# Patient Record
Sex: FEMALE | Race: BLACK OR AFRICAN AMERICAN | Employment: OTHER | URBAN - METROPOLITAN AREA
[De-identification: names, ages, dates, MRNs, and addresses within clinical notes are randomized per-mention and may not be internally consistent; named-entity substitution may affect disease eponyms.]

---

## 2018-09-16 ENCOUNTER — HOSPITAL ENCOUNTER (EMERGENCY)
Age: 68
Discharge: HOME | End: 2018-09-17
Attending: EMERGENCY MEDICINE | Admitting: EMERGENCY MEDICINE
Payer: MEDICARE
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
## 2018-09-16 DIAGNOSIS — S80.211A KNEE ABRASION, RIGHT, INITIAL ENCOUNTER: ICD-10-CM

## 2018-09-16 PROCEDURE — 99283 EMERGENCY DEPT VISIT LOW MDM: CPT

## 2018-09-17 ENCOUNTER — APPOINTMENT (OUTPATIENT)
Dept: GENERAL RADIOLOGY | Age: 68
End: 2018-09-17
Attending: EMERGENCY MEDICINE
Payer: MEDICARE

## 2018-09-17 VITALS
WEIGHT: 186 LBS | BODY MASS INDEX: 35.12 KG/M2 | SYSTOLIC BLOOD PRESSURE: 134 MMHG | DIASTOLIC BLOOD PRESSURE: 44 MMHG | RESPIRATION RATE: 15 BRPM | TEMPERATURE: 100.5 F | OXYGEN SATURATION: 97 % | HEART RATE: 72 BPM | HEIGHT: 61 IN

## 2018-09-17 PROCEDURE — 73562 X-RAY EXAM OF KNEE 3: CPT

## 2018-09-17 NOTE — DISCHARGE INSTRUCTIONS

## 2018-09-17 NOTE — ED NOTES
Pt awaken for d/c instructions to be reviewed. Have reviewed d/c instructions with pt/understanding acknowledged by pt. Pt being d/c in the custody of [employer]. Officer calling for back up- then will d/c pt from the room accordingly. NAD observed upon d/c with the [employer].

## 2018-09-17 NOTE — ED NOTES
Pt arrives to ED via EMS in custody of [employer]. Pt presents to be A/O x 4, answering questions appropriately. \"I haven't been drinking,\"  Pt has an abrasion of her RT knee area- small amt of dry blood observed. Skit her knee on the pavement while walking to the cruiser with the [employer]. Pt denying any other injury. NAD observed. [employer] standing outside pt's room.

## 2018-09-17 NOTE — ED NOTES
2nd officer at bedside. Pt being escorted by the [employer] as d/c in the [employer] custody. Pt wheeled from the ED via [facility] by this RN accompanied by the [employer]. Pt's  Paperwork/and cell phone being carried by the [employer] officer.

## 2018-09-17 NOTE — ED PROVIDER NOTES
EMERGENCY DEPARTMENT HISTORY AND PHYSICAL EXAM 
 
Date: 9/16/2018 Patient Name: Bailee Bass History of Presenting Illness Chief Complaint Patient presents with  Fall History Provided By: Patient Chief Complaint: Darius Renetta Duration: PTA Timing:  Acute Severity: 7 out of 10 Modifying Factors: No modifying factors Associated Symptoms: right knee pain and abrasion Additional History (Context):  
.11:35 PM 
Bailee Bass is a 80 y.o. female with PMHx of DM, cirrhosis, hepatitis C, sciatica, heroin abuse, chronic back pain, drug abuse presents to the emergency department via EMS following a fall onset 7/10. Associated sxs include right knee pain and abrasion. Pt was under arrest when she fell and landed on her knee, sustaining no head injury. Pt could not ambulate after the fall and is refusing a Tetanus shot. Pt denies drinking tonight and is not currently on blood thinners. Pt denies HA, and any other sxs or complaints. PCP: Gilberto Reynaga MD 
 
Current Outpatient Prescriptions Medication Sig Dispense Refill  traMADol (ULTRAM) 50 mg tablet Take 1 Tab by mouth every six (6) hours as needed for Pain. Max Daily Amount: 200 mg. 12 Tab 0  
 albuterol (PROVENTIL HFA, VENTOLIN HFA, PROAIR HFA) 90 mcg/actuation inhaler Take  by inhalation.  benzonatate (TESSALON) 100 mg capsule Take 100 mg by mouth three (3) times daily as needed for Cough.  carboxymethylcellulose sodium 1 % dlgl Apply  to eye.  cycloSPORINE (RESTASIS) 0.05 % ophthalmic emulsion Administer 1 Drop to both eyes two (2) times a day.  guaiFENesin (ROBITUSSIN) 100 mg/5 mL liquid Take 200 mg by mouth three (3) times daily as needed for Cough.  hydrOXYzine (VISTARIL) 25 mg capsule Take 25 mg by mouth three (3) times daily as needed for Itching.  levothyroxine (SYNTHROID) 50 mcg tablet Take 50 mcg by mouth Daily (before breakfast).  methylPREDNISolone (MEDROL) 4 mg tablet Take 4 mg by mouth daily (with breakfast).  metroNIDAZOLE (FLAGYL) 250 mg tablet Take  by mouth three (3) times daily.  nicotine (NICODERM CQ) 21 mg/24 hr 1 Patch by TransDERmal route every twenty-four (24) hours.  omeprazole (PRILOSEC) 20 mg capsule Take 20 mg by mouth daily.  rOPINIRole (REQUIP) 0.25 mg tablet Take 0.25 mg by mouth three (3) times daily.  spironolactone (ALDACTONE) 25 mg tablet Take  by mouth daily.  traZODone (DESYREL) 100 mg tablet Take 100 mg by mouth nightly.  TOPIRAMATE (TOPAMAX PO) Take  by mouth. Past History Past Medical History: 
Past Medical History:  
Diagnosis Date  Chronic back pain  Diabetes ([address]  Drug abuse  Hepatic cirrhosis (HCC)  Hepatitis C   
 Heroin abuse  Pain management  Restless leg syndrome  Sciatica  Spleen enlarged  Thyroid disease Past Surgical History: 
Past Surgical History:  
Procedure Laterality Date  HX CHOLECYSTECTOMY  HX GYN    
 hysterectomy  HX HEENT    
 T&A  HX ORTHOPAEDIC Bunion, left hand and right arm abscess removal  
 
 
Family History: 
History reviewed. No pertinent family history. Social History: 
Social History Substance Use Topics  Smoking status: Current Every Day Smoker  Smokeless tobacco: Never Used  Alcohol use No  
 
 
Allergies: Allergies Allergen Reactions  Erythromycin Hives  Penicillins Hives  Tetracycline Hives Review of Systems Review of Systems Musculoskeletal: Positive for arthralgias. Negative for joint swelling. Skin: Positive for wound. Neurological: Negative for headaches. All other systems reviewed and are negative. Physical Exam  
 
Vitals:  
 09/16/18 2333 BP: 143/76 Pulse: 78 Resp: 18 Temp: 98.8 °F (37.1 °C) SpO2: 98% Weight: 84.4 kg (186 lb) Height: 5' 1\" (1.549 m) Physical Exam  
 Nursing note and vitals reviewed. Constitutional: Well appearing, no acute distress, Disheveled Head: Normocephalic, Atraumatic Eyes: Pupils are equal, round, and reactive to light, EOMI Neck: Supple, non-tender Cardiovascular: Regular rate and rhythm, no murmurs, rubs, or gallops Chest: Normal work of breathing and chest excursion bilaterally Lungs: Clear to ausculation bilaterally Abdomen: Soft, non tender, non distended, normoactive bowel sounds Back: No evidence of trauma or deformity Extremities: no LE edema Abrasion to her right knee and tenderness to her anteriosuperior right tibia Skin: Warm and dry, normal cap refill. Bruising over her breasts from a recent biopsy Neuro: Alert and appropriate, CN intact, normal speech, strength and sensation full and symmetric bilaterally, normal gait, normal coordination Psychiatric: Normal mood and affect Diagnostic Study Results Labs - No results found for this or any previous visit (from the past 12 hour(s)). Radiologic Studies -  
XR KNEE RT 3 V Final Result Impression:   
  Impression: 
------------- Mild medial knee degenerative change. No fracture or joint effusion.  
  Narrative:  
  --------------------------------------------------------------------------- 
<<<<<<<<<           [facility]           >>>>>>>>>  
--------------------------------------------------------------------------- CLINICAL HISTORY: Injury. Pain. COMPARISON EXAMINATIONS: None. --- Right knee, 3 views --- No fracture or suspicious bone lesion.  There is mild medial knee degenerative 
change with mild loss of joint space and osteophyte formation. The soft tissues are unremarkable.  No knee effusion. 
 
 
-------------  
 
  
 
CT Results  (Last 48 hours) None CXR Results  (Last 48 hours) None Medical Decision Making I am the first provider for this patient. I reviewed the vital signs, available nursing notes, past medical history, past surgical history, family history and social history. Vital Signs-Reviewed the patient's vital signs. Pulse Oximetry Analysis - 98% on RA Cardiac Monitor: 
Rate: 78 bpm 
Rhythm: NSR Records Reviewed: Nursing Notes and Old Medical Records Provider Notes (Medical Decision Making):  
 
Procedures: 
Procedures ED Course:  
11:35 PM Initial assessment performed. The patients presenting problems have been discussed, and they are in agreement with the care plan formulated and outlined with them. I have encouraged them to ask questions as they arise throughout their visit. Diagnosis and Disposition DISCHARGE NOTE: 
1:04 AM 
Herminia AgostoDereckShukri's  results have been reviewed with her. She has been counseled regarding her diagnosis, treatment, and plan. She verbally conveys understanding and agreement of the signs, symptoms, diagnosis, treatment and prognosis and additionally agrees to follow up as discussed. She also agrees with the care-plan and conveys that all of her questions have been answered. I have also provided discharge instructions for her that include: educational information regarding their diagnosis and treatment, and list of reasons why they would want to return to the ED prior to their follow-up appointment, should her condition change. She has been provided with education for proper emergency department utilization. CLINICAL IMPRESSION: 
 
1. Fall, initial encounter 2. Knee abrasion, right, initial encounter Discussion: 
80 y.o. female status post mechanical fall in police custody resulting in a right knee abrasion. No acute  fx on imaging. Will discharge with PCP follow up and return precautions. Refuses Tetanus shot. PLAN: 
1. D/C Home 2. Current Discharge Medication List  
  
 
3. Follow-up Information Follow up With Details Comments Contact Info Armani Dsouza DO  For primary care follow up [address] [place] [address] [address] [place] [address] 
[phone] [facility]  As needed, if symptoms worsen [address] [place] [address] 
[phone]  
  
 
_______________________________ Attestations: This note is prepared by Sofia Camp, acting as Scribe for Clifford Bailon MD. Clifford Bailon MD:  The scribe's documentation has been prepared under my direction and personally reviewed by me in its entirety. I confirm that the note above accurately reflects all work, treatment, procedures, and medical decision making performed by me. 
_______________________________

## 2018-09-17 NOTE — ED TRIAGE NOTES
Pt is currently under arrest and was being taken to the patrol car when she fell and hurt her right knee. Abrasion noted to the right knee.

## 2018-09-17 NOTE — ED NOTES
Cleaned abrasion of RT knee with wound cleanser. Band aide applied. Pt tolerated well. [employer] at bedside.

## 2018-11-03 ENCOUNTER — APPOINTMENT (OUTPATIENT)
Dept: GENERAL RADIOLOGY | Age: 68
End: 2018-11-03
Attending: EMERGENCY MEDICINE
Payer: MEDICARE

## 2018-11-03 ENCOUNTER — HOSPITAL ENCOUNTER (EMERGENCY)
Age: 68
Discharge: HOME | End: 2018-11-03
Attending: EMERGENCY MEDICINE
Payer: MEDICARE

## 2018-11-03 VITALS
OXYGEN SATURATION: 98 % | TEMPERATURE: 97.9 F | HEART RATE: 66 BPM | BODY MASS INDEX: 34.17 KG/M2 | RESPIRATION RATE: 20 BRPM | DIASTOLIC BLOOD PRESSURE: 95 MMHG | SYSTOLIC BLOOD PRESSURE: 161 MMHG | HEIGHT: 61 IN | WEIGHT: 181 LBS

## 2018-11-03 DIAGNOSIS — S50.11XA CONTUSION OF RIGHT FOREARM, INITIAL ENCOUNTER: Primary | ICD-10-CM

## 2018-11-03 PROCEDURE — 99282 EMERGENCY DEPT VISIT SF MDM: CPT

## 2018-11-03 PROCEDURE — 73090 X-RAY EXAM OF FOREARM: CPT

## 2018-11-03 NOTE — DISCHARGE INSTRUCTIONS
Contusion: Care Instructions  Your Care Instructions    Contusion is the medical term for a bruise. It is the result of a direct blow or an impact, such as a fall. Contusions are common sports injuries. Most people think of a bruise as a black-and-blue spot. This happens when small blood vessels get torn and leak blood under the skin. But bones, muscles, and organs can also get bruised. This may damage deep tissues but not cause a bruise you can see. The doctor will do a physical exam to find the location of your contusion. You may also have tests to make sure you do not have a more serious injury, such as a broken bone or nerve damage. These may include X-rays or other imaging tests like a CT scan or MRI. Deep-tissue contusions may cause pain and swelling. But if there is no serious damage, they will often get better in a few weeks with home treatment. The doctor has checked you carefully, but problems can develop later. If you notice any problems or new symptoms, get medical treatment right away. Follow-up care is a key part of your treatment and safety. Be sure to make and go to all appointments, and call your doctor if you are having problems. It's also a good idea to know your test results and keep a list of the medicines you take. How can you care for yourself at home? · Put ice or a cold pack on the sore area for 10 to 20 minutes at a time to stop swelling. Put a thin cloth between the ice pack and your skin. · Be safe with medicines. Read and follow all instructions on the label. ? If the doctor gave you a prescription medicine for pain, take it as prescribed. ? If you are not taking a prescription pain medicine, ask your doctor if you can take an over-the-counter medicine. · If you can, prop up the sore area on pillows as much as possible for the next few days. Try to keep the sore area above the level of your heart. When should you call for help?   Call your doctor now or seek immediate medical care if:    · Your pain gets worse.     · You have new or worse swelling.     · You have tingling, weakness, or numbness in the area near the contusion.     · The area near the contusion is cold or pale.    Watch closely for changes in your health, and be sure to contact your doctor if:    · You do not get better as expected. Where can you learn more? Go to http://vic-angle.info/. Enter U641 in the search box to learn more about \"Contusion: Care Instructions. \"  Current as of: November 20, 2017  Content Version: 11.8  © 1075-2488 [employer]. Care instructions adapted under license by [employer] (which disclaims liability or warranty for this information). If you have questions about a medical condition or this instruction, always ask your healthcare professional. [address] any warranty or liability for your use of this information.

## 2018-11-03 NOTE — ED TRIAGE NOTES
C/o right arm pain for one week. States she slept on her arm \"wrong\" for many hours, seen at the [facility] ED yesterday for the same. States they couldn't rule out a fracture, given ibuprofen and a sling. States the [place] is supposed to be getting pt in to see ortho.  States she is here for pain medication and to see if her arm is broken and if she needs a cast.

## 2018-11-03 NOTE — ED PROVIDER NOTES
EMERGENCY DEPARTMENT HISTORY AND PHYSICAL EXAM 
 
Date: 11/3/2018 Patient Name: Susana Brown History of Presenting Illness Chief Complaint Patient presents with  Arm Pain History Provided By: Patient Chief Complaint: Arm Pain Duration: 1 Weeks Timing:  Constant Location: Right elbow to forearm Severity: 10 out of 10 Modifying Factors: Worse with movement Associated Symptoms: denies any other associated signs or symptoms Additional History (Context):  
1:24 PM 
Susana Brown is a 80 y.o. female with PMHX DM, HTN, hypothyroidism presents to the emergency department C/O constant right arm pain extending from the elbow to the forearm, onset 1 week ago s/p falling asleep with her head propped up on her right arms. Worse with movement. Currently taking Ibuprofen and Gabapentin for pain. No other associated sxs. Pt was seen at the [facility] yesterday, XR showed no acute fractures, and pt is waiting to follow up with orthopedics. Endorses tobacco use. Pt denies any other sxs or complaints. PCP: Blu, MD Boo 
 
Current Outpatient Medications Medication Sig Dispense Refill  traMADol (ULTRAM) 50 mg tablet Take 1 Tab by mouth every six (6) hours as needed for Pain. Max Daily Amount: 200 mg. 12 Tab 0  
 albuterol (PROVENTIL HFA, VENTOLIN HFA, PROAIR HFA) 90 mcg/actuation inhaler Take  by inhalation.  benzonatate (TESSALON) 100 mg capsule Take 100 mg by mouth three (3) times daily as needed for Cough.  carboxymethylcellulose sodium 1 % dlgl Apply  to eye.  cycloSPORINE (RESTASIS) 0.05 % ophthalmic emulsion Administer 1 Drop to both eyes two (2) times a day.  guaiFENesin (ROBITUSSIN) 100 mg/5 mL liquid Take 200 mg by mouth three (3) times daily as needed for Cough.  hydrOXYzine (VISTARIL) 25 mg capsule Take 25 mg by mouth three (3) times daily as needed for Itching.     
 levothyroxine (SYNTHROID) 50 mcg tablet Take 50 mcg by mouth Daily (before breakfast).  methylPREDNISolone (MEDROL) 4 mg tablet Take 4 mg by mouth daily (with breakfast).  metroNIDAZOLE (FLAGYL) 250 mg tablet Take  by mouth three (3) times daily.  nicotine (NICODERM CQ) 21 mg/24 hr 1 Patch by TransDERmal route every twenty-four (24) hours.  omeprazole (PRILOSEC) 20 mg capsule Take 20 mg by mouth daily.  rOPINIRole (REQUIP) 0.25 mg tablet Take 0.25 mg by mouth three (3) times daily.  spironolactone (ALDACTONE) 25 mg tablet Take  by mouth daily.  traZODone (DESYREL) 100 mg tablet Take 100 mg by mouth nightly.  TOPIRAMATE (TOPAMAX PO) Take  by mouth. Past History Past Medical History: 
Past Medical History:  
Diagnosis Date  Chronic back pain  Diabetes ([address]  Drug abuse  Hepatic cirrhosis (HCC)  Hepatitis C   
 Heroin abuse  Pain management  Restless leg syndrome  Sciatica  Spleen enlarged  Thyroid disease Past Surgical History: 
Past Surgical History:  
Procedure Laterality Date  HX CHOLECYSTECTOMY  HX GYN    
 hysterectomy  HX HEENT    
 T&A  HX ORTHOPAEDIC Bunion, left hand and right arm abscess removal  
 
 
Family History: No family history on file. Social History: 
Social History Tobacco Use  Smoking status: Current Every Day Smoker  Smokeless tobacco: Never Used Substance Use Topics  Alcohol use: No  
 Drug use: No  
 
 
Allergies: Allergies Allergen Reactions  Erythromycin Hives  Penicillins Hives  Tetracycline Hives Review of Systems Review of Systems Constitutional: Negative for chills and fever. Musculoskeletal: Positive for myalgias (right arm). All other systems reviewed and are negative. Physical Exam  
 
Vitals:  
 11/03/18 1250 BP: 153/81 Pulse: 68 Resp: 18 Temp: 97.9 °F (36.6 °C) SpO2: 98% Weight: 82.1 kg (181 lb) Height: 5' 1\" (1.549 m) Physical Exam  
 Nursing note and vitals reviewed. Constitutional: Well appearing, no acute distress Head: Normocephalic, Atraumatic Eyes: Pupils are equal, round, and reactive to light, EOMI Neck: Supple, non-tender Chest: Normal work of breathing and chest excursion bilaterally Back: No evidence of trauma or deformity Extremities: No evidence of trauma or deformity, no LE edema. No obvious deformity or trauma to the right forearm, however, TTP to the proximal forearm. DNV. Skin: Warm and dry, normal cap refill Neuro: Alert and appropriate, CN intact, normal speech, strength and sensation full and symmetric bilaterally, normal gait, normal coordination Psychiatric: Normal mood and affect Diagnostic Study Results Labs - No results found for this or any previous visit (from the past 12 hour(s)). Radiologic Studies -  
XR FOREARM RT AP/LAT Final Result IMPRESSION: 
 
No significant abnormality. As read by the radiologist.   
 
CT Results  (Last 48 hours) None CXR Results  (Last 48 hours) None Medical Decision Making I am the first provider for this patient. I reviewed the vital signs, available nursing notes, past medical history, past surgical history, family history and social history. Vital Signs-Reviewed the patient's vital signs. Pulse Oximetry Analysis - 98% on RA Records Reviewed: Nursing Notes Provider Notes:  
80 y.o. female presenting with right forearm pain x1 week. Seen at [place] ED yesterday and was told she had no fx and referred for ortho f/u, however, pt continues to c/o pain. On exam, she is DNV without obvious deformity, however, she is TTP. Will repeat imaging and d/c with ortho f/u as previously planned. Procedures: 
Procedures ED Course:  
1:24 PM Initial assessment performed.  The patients presenting problems have been discussed, and they are in agreement with the care plan formulated and outlined with them. I have encouraged them to ask questions as they arise throughout their visit. Diagnosis and Disposition DISCHARGE NOTE: 
2:13 PM 
Maryana Oliver's  results have been reviewed with her. She has been counseled regarding her diagnosis, treatment, and plan. She verbally conveys understanding and agreement of the signs, symptoms, diagnosis, treatment and prognosis and additionally agrees to follow up as discussed. She also agrees with the care-plan and conveys that all of her questions have been answered. I have also provided discharge instructions for her that include: educational information regarding their diagnosis and treatment, and list of reasons why they would want to return to the ED prior to their follow-up appointment, should her condition change. She has been provided with education for proper emergency department utilization. CLINICAL IMPRESSION: 
 
1. Contusion of right forearm, initial encounter PLAN: 
1. D/C Home 2. Current Discharge Medication List  
  
 
3. Follow-up Information Follow up With Specialties Details Why Contact Info Donavon Nathan MD Orthopedic Surgery Schedule an appointment as soon as possible for a visit in 2 days Orthopedics follow up. [address] [facility] Orthopedic and [address] [place] [address] 
[phone] [facility] EMERGENCY DEPT Emergency Medicine  As needed, if symptoms worsen [address] [facility] [address] [place] [address] 
[phone]  
  
 
___________________________ Attestations:  
 
SCRIBE ATTESTATION: 
This note is prepared by Ap[place]ador, acting as Scribe for General Alan DO. 
 
PROVIDER ATTESTATION: 
General Alan DO: The scribe's documentation has been prepared under my direction and personally reviewed by me in its entirety.  I confirm that the note above accurately reflects all work, treatment, procedures, and medical decision making performed by me.  
_______________________________

## 2020-08-23 ENCOUNTER — APPOINTMENT (OUTPATIENT)
Dept: GENERAL RADIOLOGY | Age: 70
End: 2020-08-23
Attending: EMERGENCY MEDICINE
Payer: MEDICARE

## 2020-08-23 ENCOUNTER — HOSPITAL ENCOUNTER (EMERGENCY)
Age: 70
Discharge: HOME | End: 2020-08-23
Attending: EMERGENCY MEDICINE
Payer: MEDICARE

## 2020-08-23 VITALS
OXYGEN SATURATION: 97 % | HEART RATE: 58 BPM | RESPIRATION RATE: 14 BRPM | SYSTOLIC BLOOD PRESSURE: 127 MMHG | TEMPERATURE: 98.2 F | WEIGHT: 178 LBS | HEIGHT: 62 IN | DIASTOLIC BLOOD PRESSURE: 62 MMHG | BODY MASS INDEX: 32.76 KG/M2

## 2020-08-23 DIAGNOSIS — S22.41XA CLOSED FRACTURE OF MULTIPLE RIBS OF RIGHT SIDE, INITIAL ENCOUNTER: Primary | ICD-10-CM

## 2020-08-23 DIAGNOSIS — S80.11XA CONTUSION OF RIGHT LOWER LEG, INITIAL ENCOUNTER: ICD-10-CM

## 2020-08-23 DIAGNOSIS — S70.02XA CONTUSION OF LEFT HIP, INITIAL ENCOUNTER: ICD-10-CM

## 2020-08-23 DIAGNOSIS — S70.01XA CONTUSION OF RIGHT HIP, INITIAL ENCOUNTER: ICD-10-CM

## 2020-08-23 PROCEDURE — 71101 X-RAY EXAM UNILAT RIBS/CHEST: CPT

## 2020-08-23 PROCEDURE — 74011250637 HC RX REV CODE- 250/637: Performed by: EMERGENCY MEDICINE

## 2020-08-23 PROCEDURE — 99283 EMERGENCY DEPT VISIT LOW MDM: CPT

## 2020-08-23 PROCEDURE — 77030013140 HC MSK NEB VYRM -A

## 2020-08-23 RX ORDER — HYDROCODONE BITARTRATE AND ACETAMINOPHEN 5; 325 MG/1; MG/1
1 TABLET ORAL
Status: COMPLETED | OUTPATIENT
Start: 2020-08-23 | End: 2020-08-23

## 2020-08-23 RX ORDER — HYDROCODONE BITARTRATE AND ACETAMINOPHEN 5; 325 MG/1; MG/1
1 TABLET ORAL
Qty: 18 TAB | Refills: 0 | Status: SHIPPED | OUTPATIENT
Start: 2020-08-23 | End: 2020-08-28

## 2020-08-23 RX ADMIN — HYDROCODONE BITARTRATE AND ACETAMINOPHEN 1 TABLET: 5; 325 TABLET ORAL at 17:37

## 2020-08-23 NOTE — ED PROVIDER NOTES
EMERGENCY DEPARTMENT HISTORY AND PHYSICAL EXAM    Date: 8/23/2020  Patient Name: Gracie Rousseau    History of Presenting Illness     Chief Complaint   Patient presents with    Fall       History Provided By: Patient     History Johnkiman Apley):   5:24 PM  Gracie Rousseau is a 72 y.o. female with PMHX of Chronic back pain, diabetes, drug abuse, cirrhosis, hep C, heroin abuse, pain management, sciatica, thyroid disease who presents to the emergency department C/O fall onset 1 week ago. Associated sxs include pain in the bilateral hips, pain in the right shin pain on the right side of the chest. Pt denies loss of consciousness or any other sxs or complaints. Patient states she was walking in a store about 1 week ago when she tripped over a rack in the store falling and injuring herself. She was seen in the emergency department of the [facility] in [place] and had x-rays and then was discharged home and told to follow-up with her primary care doctor. She has not yet followed up with her primary care doctor. Chief Complaint: Fall  Duration: Momentary  Timing: Acute  Location: Pain in the bilateral hips right shin and right chest  Quality: Sharp  Severity: Moderate  Modifying Factors: Nothing makes it better, remittent makes it worse. Associated Symptoms: Pain and bruising to the bilateral hips right shin and right chest    PCP: Boo Hurt MD  [facility]. Current Outpatient Medications   Medication Sig Dispense Refill    HYDROcodone-acetaminophen (Norco) 5-325 mg per tablet Take 1 Tab by mouth every six (6) hours as needed for Pain for up to 5 days. Max Daily Amount: 4 Tabs. 18 Tab 0    traMADol (ULTRAM) 50 mg tablet Take 1 Tab by mouth every six (6) hours as needed for Pain. Max Daily Amount: 200 mg. 12 Tab 0    albuterol (PROVENTIL HFA, VENTOLIN HFA, PROAIR HFA) 90 mcg/actuation inhaler Take  by inhalation.       benzonatate (TESSALON) 100 mg capsule Take 100 mg by mouth three (3) times daily as needed for Cough.  carboxymethylcellulose sodium 1 % dlgl Apply  to eye.  cycloSPORINE (RESTASIS) 0.05 % ophthalmic emulsion Administer 1 Drop to both eyes two (2) times a day.  guaiFENesin (ROBITUSSIN) 100 mg/5 mL liquid Take 200 mg by mouth three (3) times daily as needed for Cough.  hydrOXYzine (VISTARIL) 25 mg capsule Take 25 mg by mouth three (3) times daily as needed for Itching.  levothyroxine (SYNTHROID) 50 mcg tablet Take 50 mcg by mouth Daily (before breakfast).  methylPREDNISolone (MEDROL) 4 mg tablet Take 4 mg by mouth daily (with breakfast).  metroNIDAZOLE (FLAGYL) 250 mg tablet Take  by mouth three (3) times daily.  nicotine (NICODERM CQ) 21 mg/24 hr 1 Patch by TransDERmal route every twenty-four (24) hours.  omeprazole (PRILOSEC) 20 mg capsule Take 20 mg by mouth daily.  rOPINIRole (REQUIP) 0.25 mg tablet Take 0.25 mg by mouth three (3) times daily.  spironolactone (ALDACTONE) 25 mg tablet Take  by mouth daily.  traZODone (DESYREL) 100 mg tablet Take 100 mg by mouth nightly.  TOPIRAMATE (TOPAMAX PO) Take  by mouth. Past History     Past Medical History:  Past Medical History:   Diagnosis Date    Chronic back pain     Diabetes ([address]     Drug abuse     Hepatic cirrhosis (HCC)     Hepatitis C     Heroin abuse     Pain management     Restless leg syndrome     Sciatica     Spleen enlarged     Thyroid disease        Past Surgical History:  Past Surgical History:   Procedure Laterality Date    HX CHOLECYSTECTOMY      HX GYN      hysterectomy    HX HEENT      T&A    HX ORTHOPAEDIC      Bunion, left hand and right arm abscess removal       Family History:  No family history on file. Social History:  Social History     Tobacco Use    Smoking status: Current Every Day Smoker    Smokeless tobacco: Never Used   Substance Use Topics    Alcohol use: No    Drug use: No       Allergies:   Allergies Allergen Reactions    Erythromycin Hives    Penicillins Hives    Tetracycline Hives         Review of Systems     Review of Systems   Constitutional: Negative for chills and fever. HENT: Negative for congestion, rhinorrhea and sore throat. Eyes: Negative for pain and visual disturbance. Respiratory: Negative for cough, shortness of breath and wheezing. Cardiovascular: Negative for chest pain and palpitations. Gastrointestinal: Negative for abdominal pain, diarrhea and vomiting. Genitourinary: Negative for dysuria, flank pain, frequency and urgency. Musculoskeletal: Positive for myalgias. Negative for arthralgias. Skin: Positive for wound. Negative for rash. Neurological: Negative for speech difficulty, weakness, light-headedness and headaches. Psychiatric/Behavioral: Negative for agitation and confusion. All other systems reviewed and are negative. Physical Exam     Vitals:    08/23/20 1703 08/23/20 1836   BP: 127/62    Pulse: 75 (!) 58   Resp: 16 14   Temp: 98.2 °F (36.8 °C)    SpO2:  97%   Weight: 80.7 kg (178 lb)    Height: 5' 2\" (1.575 m)        Physical Exam  Vitals signs and nursing note reviewed. Constitutional:       General: She is not in acute distress. Appearance: Normal appearance. She is normal weight. She is not ill-appearing. HENT:      Head: Normocephalic and atraumatic. Nose: Nose normal. No rhinorrhea. Mouth/Throat:      Mouth: Mucous membranes are moist.      Pharynx: No oropharyngeal exudate or posterior oropharyngeal erythema. Eyes:      Extraocular Movements: Extraocular movements intact. Conjunctiva/sclera: Conjunctivae normal.      Pupils: Pupils are equal, round, and reactive to light. Neck:      Musculoskeletal: Normal range of motion and neck supple. No neck rigidity. Cardiovascular:      Rate and Rhythm: Normal rate and regular rhythm. Heart sounds: No murmur. No friction rub. No gallop.     Pulmonary:      Effort: Pulmonary effort is normal. No respiratory distress. Breath sounds: Normal breath sounds. No wheezing, rhonchi or rales. Chest:      Chest wall: Tenderness present. No mass, lacerations, deformity, swelling or crepitus. Abdominal:      General: Bowel sounds are normal.      Palpations: Abdomen is soft. Tenderness: There is no abdominal tenderness. There is no guarding or rebound. Musculoskeletal: Normal range of motion. General: No swelling, tenderness or deformity. Arms:    Lymphadenopathy:      Cervical: No cervical adenopathy. Skin:     General: Skin is warm and dry. Findings: Ecchymosis present. No rash. Neurological:      General: No focal deficit present. Mental Status: She is alert and oriented to person, place, and time. Psychiatric:         Mood and Affect: Mood normal.         Behavior: Behavior normal.         Diagnostic Study Results     Labs -   No results found for this or any previous visit (from the past 12 hour(s)). Radiologic Studies -   XR RIBS RT W PA CXR MIN 3 V   Final Result   IMPRESSION:      1. Equivocal findings of nondisplaced lateral right eighth and ninth rib   fractures. No other rib fractures identified. 2. Linear atelectasis at the right lung base. No pleural effusion or   pneumothorax identified. CT Results  (Last 48 hours)    None        CXR Results  (Last 48 hours)    None          Medications given in the ED-  Medications   HYDROcodone-acetaminophen (NORCO) 5-325 mg per tablet 1 Tab (1 Tab Oral Given 8/23/20 8161)         Medical Decision Making   I am the first provider for this patient. I reviewed the vital signs, available nursing notes, past medical history, past surgical history, family history and social history. Vital Signs-Reviewed the patient's vital signs.     Pulse Oximetry Analysis - 97% on RA     Cardiac Monitor:  Rate: 75 bpm  Rhythm: NSR    Records Reviewed: Nursing Notes    Provider Notes (Medical Decision Making):   DDX: Hematoma, sprain, strain, contusion, fracture    Procedures:  Procedures    ED Course:   5:24 PM Initial assessment performed. The patients presenting problems have been discussed, and they are in agreement with the care plan formulated and outlined with them. I have encouraged them to ask questions as they arise throughout their visit. Diagnosis and Disposition     Discussion:  72 y.o. female 1 week out from a fall which was non-syncopal in nature and ground-level. Patient has bilateral hip contusions and right tibia contusion she also has fractures of her right eighth and ninth ribs which are nondisplaced and non-flail segment. Will treat pain as an outpatient. Patient will also need an incentive spirometer for home use. Patient may follow-up with the [facility]. Patient understands and agrees with this plan. DISCHARGE NOTE:  6:36 PM   Gwen AgostoDereckShukri's  results have been reviewed with her. She has been counseled regarding her diagnosis, treatment, and plan. She verbally conveys understanding and agreement of the signs, symptoms, diagnosis, treatment and prognosis and additionally agrees to follow up as discussed. She also agrees with the care-plan and conveys that all of her questions have been answered. I have also provided discharge instructions for her that include: educational information regarding their diagnosis and treatment, and list of reasons why they would want to return to the ED prior to their follow-up appointment, should her condition change. She has been provided with education for proper emergency department utilization. CLINICAL IMPRESSION:    1. Closed fracture of multiple ribs of right side, initial encounter    2. Contusion of left hip, initial encounter    3. Contusion of right hip, initial encounter    4. Contusion of right lower leg, initial encounter        PLAN:  1. D/C Home  2.    Current Discharge Medication List START taking these medications    Details   HYDROcodone-acetaminophen (Norco) 5-325 mg per tablet Take 1 Tab by mouth every six (6) hours as needed for Pain for up to 5 days. Max Daily Amount: 4 Tabs. Qty: 18 Tab, Refills: 0    Associated Diagnoses: Closed fracture of multiple ribs of right side, initial encounter         CONTINUE these medications which have NOT CHANGED    Details   traMADol (ULTRAM) 50 mg tablet Take 1 Tab by mouth every six (6) hours as needed for Pain. Max Daily Amount: 200 mg. Qty: 12 Tab, Refills: 0      albuterol (PROVENTIL HFA, VENTOLIN HFA, PROAIR HFA) 90 mcg/actuation inhaler Take  by inhalation. benzonatate (TESSALON) 100 mg capsule Take 100 mg by mouth three (3) times daily as needed for Cough. carboxymethylcellulose sodium 1 % dlgl Apply  to eye. cycloSPORINE (RESTASIS) 0.05 % ophthalmic emulsion Administer 1 Drop to both eyes two (2) times a day. guaiFENesin (ROBITUSSIN) 100 mg/5 mL liquid Take 200 mg by mouth three (3) times daily as needed for Cough. hydrOXYzine (VISTARIL) 25 mg capsule Take 25 mg by mouth three (3) times daily as needed for Itching. levothyroxine (SYNTHROID) 50 mcg tablet Take 50 mcg by mouth Daily (before breakfast). methylPREDNISolone (MEDROL) 4 mg tablet Take 4 mg by mouth daily (with breakfast). metroNIDAZOLE (FLAGYL) 250 mg tablet Take  by mouth three (3) times daily. nicotine (NICODERM CQ) 21 mg/24 hr 1 Patch by TransDERmal route every twenty-four (24) hours. omeprazole (PRILOSEC) 20 mg capsule Take 20 mg by mouth daily. rOPINIRole (REQUIP) 0.25 mg tablet Take 0.25 mg by mouth three (3) times daily. spironolactone (ALDACTONE) 25 mg tablet Take  by mouth daily. traZODone (DESYREL) 100 mg tablet Take 100 mg by mouth nightly. TOPIRAMATE (TOPAMAX PO) Take  by mouth.            3.   Follow-up Information     Follow up With Specialties Details [facility]  Schedule an appointment as soon as possible for a visit in 2 days For follow up from Emergency Department visit. [address] [place] [address]  [phone]    [facility] EMERGENCY DEPT Emergency Medicine  As needed; If symptoms worsen [address] [phone]     Please note that this dictation was completed with [employer], the computer voice recognition software. Quite often unanticipated grammatical, syntax, homophones, and other interpretive errors are inadvertently transcribed by the computer software. Please disregard these errors. Please excuse any errors that have escaped final proofreading.     Papo Stratton MD

## 2020-08-23 NOTE — ED NOTES
I have reviewed discharge instructions with the patient. The patient verbalized understanding. Patient armband removed and shredded    Pt given an incentive spirometer and helped to the car.

## 2020-08-23 NOTE — DISCHARGE INSTRUCTIONS
Patient Education        Contusion: Care Instructions  Your Care Instructions     Contusion is the medical term for a bruise. It is the result of a direct blow or an impact, such as a fall. Contusions are common sports injuries. Most people think of a bruise as a black-and-blue spot. This happens when small blood vessels get torn and leak blood under the skin. But bones, muscles, and organs can also get bruised. This may damage deep tissues but not cause a bruise you can see. The doctor will do a physical exam to find the location of your contusion. You may also have tests to make sure you do not have a more serious injury, such as a broken bone or nerve damage. These may include X-rays or other imaging tests like a CT scan or MRI. Deep-tissue contusions may cause pain and swelling. But if there is no serious damage, they will often get better in a few weeks with home treatment. The doctor has checked you carefully, but problems can develop later. If you notice any problems or new symptoms, get medical treatment right away. Follow-up care is a key part of your treatment and safety. Be sure to make and go to all appointments, and call your doctor if you are having problems. It's also a good idea to know your test results and keep a list of the medicines you take. How can you care for yourself at home? · Put ice or a cold pack on the sore area for 10 to 20 minutes at a time to stop swelling. Put a thin cloth between the ice pack and your skin. · Be safe with medicines. Read and follow all instructions on the label. ? If the doctor gave you a prescription medicine for pain, take it as prescribed. ? If you are not taking a prescription pain medicine, ask your doctor if you can take an over-the-counter medicine. · If you can, prop up the sore area on pillows as much as possible for the next few days. Try to keep the sore area above the level of your heart. When should you call for help?    Call your doctor now or seek immediate medical care if:  · Your pain gets worse. · You have new or worse swelling. · You have tingling, weakness, or numbness in the area near the contusion. · The area near the contusion is cold or pale. Watch closely for changes in your health, and be sure to contact your doctor if:  · You do not get better as expected. Where can you learn more? Go to http://vic-angle.info/  Enter [id number] in the search box to learn more about \"Contusion: Care Instructions. \"  Current as of: June 26, 2019               Content Version: 12.5  © 2399-5073 [employer]. Care instructions adapted under license by [employer] (which disclaims liability or warranty for this information). If you have questions about a medical condition or this instruction, always ask your healthcare professional. [address] any warranty or liability for your use of this information. Patient Education        Broken Rib: Care Instructions  Your Care Instructions     A broken rib is a crack or break in one of the bones of the rib cage. Breathing can be very painful because the muscles used for breathing pull on the rib. In most cases, a broken rib will heal on its own. You can take pain medicine while the rib mends. Pain relief allows you to take deep breaths. In the past, doctors recommended taping or wrapping broken ribs. This is no longer done because taping makes it hard for you to take deep breaths. Taking deep breaths may help prevent pneumonia or a partial collapse of a lung. Your rib will heal in about 6 weeks. You heal best when you take good care of yourself. Eat a variety of healthy foods, and don't smoke. Follow-up care is a key part of your treatment and safety. Be sure to make and go to all appointments, and call your doctor if you are having problems. It's also a good idea to know your test results and keep a list of the medicines you take.   How can you care for yourself at home? · Be safe with medicines. Read and follow all instructions on the label. ? If the doctor gave you a prescription medicine for pain, take it as prescribed. ? If you are not taking a prescription pain medicine, ask your doctor if you can take an over-the-counter medicine. · Even if it hurts, try to cough or take the deepest breath you can at least once every hour. This will get air deeply into your lungs. This may reduce your chance of getting pneumonia or a partial collapse of a lung. Hold a pillow against your chest to make this less painful. · Put ice or a cold pack on the area for 10 to 20 minutes at a time. Put a thin cloth between the ice and your skin. When should you call for help? [id number] anytime you think you may need emergency care. For example, call if:  · You have severe trouble breathing. Call your doctor now or seek immediate medical care if:  · You have some trouble breathing. · You have a fever. · You have a new or worse cough. Watch closely for changes in your health, and be sure to contact your doctor if:  · You have pain even after taking your medicine. · You do not get better as expected. Where can you learn more? Go to http://www.gray.com/  Enter M135 in the search box to learn more about \"Broken Rib: Care Instructions. \"  Current as of: March 2, 2020               Content Version: 12.5  © 8320-5688 [employer]. Care instructions adapted under license by [employer] (which disclaims liability or warranty for this information). If you have questions about a medical condition or this instruction, always ask your healthcare professional. [address] any warranty or liability for your use of this information.

## 2020-08-23 NOTE — ED TRIAGE NOTES
Patient reports [facility] on [facility] and rest she injured her right ribs and left thigh and states it is not getting any better

## 2020-09-09 ENCOUNTER — APPOINTMENT (OUTPATIENT)
Dept: GENERAL RADIOLOGY | Age: 70
DRG: 917 | End: 2020-09-09
Attending: EMERGENCY MEDICINE
Payer: MEDICARE

## 2020-09-09 ENCOUNTER — HOSPITAL ENCOUNTER (INPATIENT)
Age: 70
LOS: 8 days | Discharge: HOME | DRG: 917 | End: 2020-09-17
Attending: EMERGENCY MEDICINE | Admitting: HOSPITALIST
Payer: MEDICARE

## 2020-09-09 ENCOUNTER — APPOINTMENT (OUTPATIENT)
Dept: CT IMAGING | Age: 70
DRG: 917 | End: 2020-09-09
Attending: HOSPITALIST
Payer: MEDICARE

## 2020-09-09 DIAGNOSIS — T40.3X1A ACCIDENTAL METHADONE OVERDOSE, INITIAL ENCOUNTER (HCC): ICD-10-CM

## 2020-09-09 DIAGNOSIS — R09.02 HYPOXIA: ICD-10-CM

## 2020-09-09 DIAGNOSIS — N28.9 RENAL INSUFFICIENCY: Primary | ICD-10-CM

## 2020-09-09 DIAGNOSIS — R06.89 HYPERCARBIA: ICD-10-CM

## 2020-09-09 DIAGNOSIS — E86.0 DEHYDRATION: ICD-10-CM

## 2020-09-09 PROBLEM — N17.9 AKI (ACUTE KIDNEY INJURY) (HCC): Status: ACTIVE | Noted: 2020-09-09

## 2020-09-09 PROBLEM — R94.31 PROLONGED Q-T INTERVAL ON ECG: Status: ACTIVE | Noted: 2020-09-09

## 2020-09-09 PROBLEM — G93.40 ENCEPHALOPATHY ACUTE: Status: ACTIVE | Noted: 2020-09-09

## 2020-09-09 PROBLEM — R00.1 BRADYCARDIA: Status: ACTIVE | Noted: 2020-09-09

## 2020-09-09 LAB
ALBUMIN SERPL-MCNC: 3.3 G/DL (ref 3.4–5)
ALBUMIN/GLOB SERPL: 1 {RATIO} (ref 0.8–1.7)
ALP SERPL-CCNC: 128 U/L (ref 45–117)
ALT SERPL-CCNC: 29 U/L (ref 13–56)
AMMONIA PLAS-SCNC: 21 UMOL/L (ref 11–32)
AMORPH CRY URNS QL MICRO: ABNORMAL
AMPHET UR QL SCN: NEGATIVE
ANION GAP SERPL CALC-SCNC: 6 MMOL/L (ref 3–18)
APPEARANCE UR: ABNORMAL
ARTERIAL PATENCY WRIST A: YES
AST SERPL-CCNC: 35 U/L (ref 10–38)
BACTERIA URNS QL MICRO: ABNORMAL /HPF
BARBITURATES UR QL SCN: NEGATIVE
BASE EXCESS BLD CALC-SCNC: 1 MMOL/L
BASOPHILS # BLD: 0 K/UL (ref 0–0.1)
BASOPHILS NFR BLD: 0 % (ref 0–2)
BDY SITE: ABNORMAL
BENZODIAZ UR QL: NEGATIVE
BILIRUB SERPL-MCNC: 0.4 MG/DL (ref 0.2–1)
BILIRUB UR QL: NEGATIVE
BUN SERPL-MCNC: 25 MG/DL (ref 7–18)
BUN/CREAT SERPL: 17 (ref 12–20)
CALCIUM SERPL-MCNC: 9.2 MG/DL (ref 8.5–10.1)
CANNABINOIDS UR QL SCN: POSITIVE
CHLORIDE SERPL-SCNC: 101 MMOL/L (ref 100–111)
CK MB CFR SERPL CALC: 1.9 % (ref 0–4)
CK MB SERPL-MCNC: 7.4 NG/ML (ref 5–25)
CK SERPL-CCNC: 398 U/L (ref 26–192)
CO2 SERPL-SCNC: 30 MMOL/L (ref 21–32)
COCAINE UR QL SCN: NEGATIVE
COLOR UR: ABNORMAL
CREAT SERPL-MCNC: 1.47 MG/DL (ref 0.6–1.3)
DIFFERENTIAL METHOD BLD: ABNORMAL
EOSINOPHIL # BLD: 0 K/UL (ref 0–0.4)
EOSINOPHIL NFR BLD: 0 % (ref 0–5)
EPITH CASTS URNS QL MICRO: ABNORMAL /LPF (ref 0–5)
ERYTHROCYTE [DISTWIDTH] IN BLOOD BY AUTOMATED COUNT: 15.8 % (ref 11.6–14.5)
GAS FLOW.O2 O2 DELIVERY SYS: ABNORMAL L/MIN
GLOBULIN SER CALC-MCNC: 3.3 G/DL (ref 2–4)
GLUCOSE SERPL-MCNC: 154 MG/DL (ref 74–99)
GLUCOSE UR STRIP.AUTO-MCNC: NEGATIVE MG/DL
HCO3 BLD-SCNC: 27.2 MMOL/L (ref 22–26)
HCT VFR BLD AUTO: 32.9 % (ref 35–45)
HDSCOM,HDSCOM: ABNORMAL
HGB BLD-MCNC: 10.8 G/DL (ref 12–16)
HGB UR QL STRIP: NEGATIVE
HYALINE CASTS URNS QL MICRO: ABNORMAL /LPF (ref 0–2)
KETONES UR QL STRIP.AUTO: NEGATIVE MG/DL
LEUKOCYTE ESTERASE UR QL STRIP.AUTO: NEGATIVE
LYMPHOCYTES # BLD: 0.3 K/UL (ref 0.9–3.6)
LYMPHOCYTES NFR BLD: 13 % (ref 21–52)
MAGNESIUM SERPL-MCNC: 2 MG/DL (ref 1.6–2.6)
MCH RBC QN AUTO: 27.1 PG (ref 24–34)
MCHC RBC AUTO-ENTMCNC: 32.8 G/DL (ref 31–37)
MCV RBC AUTO: 82.5 FL (ref 74–97)
METHADONE UR QL: POSITIVE
MONOCYTES # BLD: 0.2 K/UL (ref 0.05–1.2)
MONOCYTES NFR BLD: 7 % (ref 3–10)
MUCOUS THREADS URNS QL MICRO: ABNORMAL /LPF
NEUTS SEG # BLD: 1.8 K/UL (ref 1.8–8)
NEUTS SEG NFR BLD: 80 % (ref 40–73)
NITRITE UR QL STRIP.AUTO: NEGATIVE
OPIATES UR QL: NEGATIVE
PCO2 BLD: 51 MMHG (ref 35–45)
PCP UR QL: NEGATIVE
PH BLD: 7.34 [PH] (ref 7.35–7.45)
PH UR STRIP: 6 [PH] (ref 5–8)
PLATELET # BLD AUTO: 41 K/UL (ref 135–420)
PMV BLD AUTO: 9.9 FL (ref 9.2–11.8)
PO2 BLD: 60 MMHG (ref 80–100)
POTASSIUM SERPL-SCNC: 4.7 MMOL/L (ref 3.5–5.5)
PROT SERPL-MCNC: 6.6 G/DL (ref 6.4–8.2)
PROT UR STRIP-MCNC: ABNORMAL MG/DL
RBC # BLD AUTO: 3.99 M/UL (ref 4.2–5.3)
RBC #/AREA URNS HPF: ABNORMAL /HPF (ref 0–5)
SAO2 % BLD: 88 % (ref 92–97)
SERVICE CMNT-IMP: ABNORMAL
SODIUM SERPL-SCNC: 137 MMOL/L (ref 136–145)
SP GR UR REFRACTOMETRY: 1.02 (ref 1–1.03)
SPECIMEN TYPE: ABNORMAL
TOTAL RESP. RATE, ITRR: 11
TROPONIN I SERPL-MCNC: <0.02 NG/ML (ref 0–0.04)
UROBILINOGEN UR QL STRIP.AUTO: 1 EU/DL (ref 0.2–1)
WBC # BLD AUTO: 2.2 K/UL (ref 4.6–13.2)
WBC URNS QL MICRO: ABNORMAL /HPF (ref 0–5)

## 2020-09-09 PROCEDURE — 96374 THER/PROPH/DIAG INJ IV PUSH: CPT

## 2020-09-09 PROCEDURE — 70450 CT HEAD/BRAIN W/O DYE: CPT

## 2020-09-09 PROCEDURE — 80053 COMPREHEN METABOLIC PANEL: CPT

## 2020-09-09 PROCEDURE — C9113 INJ PANTOPRAZOLE SODIUM, VIA: HCPCS | Performed by: HOSPITALIST

## 2020-09-09 PROCEDURE — 65610000006 HC RM INTENSIVE CARE

## 2020-09-09 PROCEDURE — 71045 X-RAY EXAM CHEST 1 VIEW: CPT

## 2020-09-09 PROCEDURE — 99285 EMERGENCY DEPT VISIT HI MDM: CPT

## 2020-09-09 PROCEDURE — 74011250636 HC RX REV CODE- 250/636: Performed by: HOSPITALIST

## 2020-09-09 PROCEDURE — 74011250636 HC RX REV CODE- 250/636: Performed by: EMERGENCY MEDICINE

## 2020-09-09 PROCEDURE — 82550 ASSAY OF CK (CPK): CPT

## 2020-09-09 PROCEDURE — 82803 BLOOD GASES ANY COMBINATION: CPT

## 2020-09-09 PROCEDURE — 83735 ASSAY OF MAGNESIUM: CPT

## 2020-09-09 PROCEDURE — 85025 COMPLETE CBC W/AUTO DIFF WBC: CPT

## 2020-09-09 PROCEDURE — 93005 ELECTROCARDIOGRAM TRACING: CPT

## 2020-09-09 PROCEDURE — 74011000250 HC RX REV CODE- 250: Performed by: HOSPITALIST

## 2020-09-09 PROCEDURE — 81001 URINALYSIS AUTO W/SCOPE: CPT

## 2020-09-09 PROCEDURE — 83036 HEMOGLOBIN GLYCOSYLATED A1C: CPT

## 2020-09-09 PROCEDURE — 74011250636 HC RX REV CODE- 250/636: Performed by: INTERNAL MEDICINE

## 2020-09-09 PROCEDURE — 80307 DRUG TEST PRSMV CHEM ANLYZR: CPT

## 2020-09-09 PROCEDURE — 82140 ASSAY OF AMMONIA: CPT

## 2020-09-09 PROCEDURE — 36600 WITHDRAWAL OF ARTERIAL BLOOD: CPT

## 2020-09-09 RX ORDER — NALOXONE HYDROCHLORIDE 1 MG/ML
2 INJECTION INTRAMUSCULAR; INTRAVENOUS; SUBCUTANEOUS
Status: COMPLETED | OUTPATIENT
Start: 2020-09-09 | End: 2020-09-09

## 2020-09-09 RX ORDER — ATROPINE SULFATE 0.1 MG/ML
0.4 INJECTION INTRAVENOUS ONCE
Status: DISPENSED | OUTPATIENT
Start: 2020-09-09 | End: 2020-09-10

## 2020-09-09 RX ORDER — ONDANSETRON 2 MG/ML
4 INJECTION INTRAMUSCULAR; INTRAVENOUS
Status: DISCONTINUED | OUTPATIENT
Start: 2020-09-09 | End: 2020-09-18 | Stop reason: HOSPADM

## 2020-09-09 RX ORDER — SODIUM CHLORIDE 9 MG/ML
75 INJECTION, SOLUTION INTRAVENOUS CONTINUOUS
Status: DISCONTINUED | OUTPATIENT
Start: 2020-09-09 | End: 2020-09-11

## 2020-09-09 RX ORDER — INSULIN LISPRO 100 [IU]/ML
INJECTION, SOLUTION INTRAVENOUS; SUBCUTANEOUS EVERY 6 HOURS
Status: DISCONTINUED | OUTPATIENT
Start: 2020-09-10 | End: 2020-09-16

## 2020-09-09 RX ORDER — CLONIDINE HYDROCHLORIDE 0.1 MG/1
0.1 TABLET ORAL
Status: DISCONTINUED | OUTPATIENT
Start: 2020-09-09 | End: 2020-09-14

## 2020-09-09 RX ORDER — PROMETHAZINE HYDROCHLORIDE 25 MG/1
12.5 TABLET ORAL
Status: DISCONTINUED | OUTPATIENT
Start: 2020-09-09 | End: 2020-09-18 | Stop reason: HOSPADM

## 2020-09-09 RX ORDER — ENOXAPARIN SODIUM 100 MG/ML
40 INJECTION SUBCUTANEOUS DAILY
Status: DISCONTINUED | OUTPATIENT
Start: 2020-09-10 | End: 2020-09-10

## 2020-09-09 RX ORDER — ACETAMINOPHEN 650 MG/1
650 SUPPOSITORY RECTAL
Status: DISCONTINUED | OUTPATIENT
Start: 2020-09-09 | End: 2020-09-18 | Stop reason: HOSPADM

## 2020-09-09 RX ORDER — SODIUM CHLORIDE 0.9 % (FLUSH) 0.9 %
5-40 SYRINGE (ML) INJECTION EVERY 8 HOURS
Status: DISCONTINUED | OUTPATIENT
Start: 2020-09-09 | End: 2020-09-18 | Stop reason: HOSPADM

## 2020-09-09 RX ORDER — NALOXONE HYDROCHLORIDE 0.4 MG/ML
0.4 INJECTION, SOLUTION INTRAMUSCULAR; INTRAVENOUS; SUBCUTANEOUS
Status: DISCONTINUED | OUTPATIENT
Start: 2020-09-09 | End: 2020-09-18 | Stop reason: HOSPADM

## 2020-09-09 RX ORDER — ACETAMINOPHEN 325 MG/1
650 TABLET ORAL
Status: DISCONTINUED | OUTPATIENT
Start: 2020-09-09 | End: 2020-09-18 | Stop reason: HOSPADM

## 2020-09-09 RX ORDER — SODIUM CHLORIDE 0.9 % (FLUSH) 0.9 %
5-40 SYRINGE (ML) INJECTION AS NEEDED
Status: DISCONTINUED | OUTPATIENT
Start: 2020-09-09 | End: 2020-09-18 | Stop reason: HOSPADM

## 2020-09-09 RX ORDER — FACIAL-BODY WIPES
10 EACH TOPICAL DAILY PRN
Status: DISCONTINUED | OUTPATIENT
Start: 2020-09-09 | End: 2020-09-18 | Stop reason: HOSPADM

## 2020-09-09 RX ORDER — MAGNESIUM SULFATE 100 %
4 CRYSTALS MISCELLANEOUS AS NEEDED
Status: DISCONTINUED | OUTPATIENT
Start: 2020-09-09 | End: 2020-09-18 | Stop reason: HOSPADM

## 2020-09-09 RX ADMIN — SODIUM CHLORIDE 0.05 MG/HR: 900 INJECTION, SOLUTION INTRAVENOUS at 22:08

## 2020-09-09 RX ADMIN — NALOXONE HYDROCHLORIDE 2 MG: 1 INJECTION PARENTERAL at 16:28

## 2020-09-09 RX ADMIN — SODIUM CHLORIDE 0.3 MG/HR: 900 INJECTION, SOLUTION INTRAVENOUS at 23:33

## 2020-09-09 RX ADMIN — NALOXONE HYDROCHLORIDE 2 MG: 1 INJECTION PARENTERAL at 20:21

## 2020-09-09 RX ADMIN — SODIUM CHLORIDE 75 ML/HR: 900 INJECTION, SOLUTION INTRAVENOUS at 21:42

## 2020-09-09 RX ADMIN — Medication 10 ML: at 22:20

## 2020-09-09 RX ADMIN — SODIUM CHLORIDE 500 ML: 900 INJECTION, SOLUTION INTRAVENOUS at 17:35

## 2020-09-09 RX ADMIN — SODIUM CHLORIDE 40 MG: 9 INJECTION, SOLUTION INTRAMUSCULAR; INTRAVENOUS; SUBCUTANEOUS at 22:34

## 2020-09-09 RX ADMIN — Medication 10 ML: at 20:22

## 2020-09-09 NOTE — ED TRIAGE NOTES
Patient unable to recall events. EMS reports that she took a big gulp of her liquid methadone and patient had to be given 1.5 narcane in route. aylinnet very sleepy.  Friends reported to ems patient had been clean for 6 weeks

## 2020-09-09 NOTE — ED NOTES
After taking the patients bra and top off and placing her in a gown, a small piece of rolled up notebook paper fell onto the floor. Upon opening it, there is a white powder substance. MD, charge and Mgr notified.

## 2020-09-09 NOTE — ED NOTES
Patient arrived to ed only breathing about 8 times a minute would not open eyes or follow directions. Dr Shaheen Blair to bedside received verbal order to administer 2 mg it was given and with in seconds patient became somewhat combative and thrashing around on bed.   Breathing 27  A minute

## 2020-09-09 NOTE — ED NOTES
Patient has been rolling and throwing herself around in the bed. She was climbing out of the bed and was kicking and pulling lines. Per MD we should put her in restraints. Only bilateral leg restraints used.

## 2020-09-09 NOTE — ED PROVIDER NOTES
EMERGENCY DEPARTMENT HISTORY AND PHYSICAL EXAM    Date: 9/9/2020  Patient Name: Low Quijano    History of Presenting Illness     Chief Complaint   Patient presents with    Drug Overdose         History Provided By: Patient and EMS    Additional History (Context): Low Quijano is a 72 y.o. female with PMHX heroin abuse currently on methadone presents to the emergency department by EMS after presumed overdose. Patient did report taking additional doses of her methadone today. EMS reported when the arrived, patient was found unresponsive. Given 1.5 mg of intranasal Narcan and became more awake and alert. However on my assessment, patient continues to be drowsy. Pt denies any other drug use, chest pain, shortness of breath, and any other sxs or complaints. PCP: Bul, MD Boo    Current Facility-Administered Medications   Medication Dose Route Frequency Provider Last Rate Last Dose    sodium chloride 0.9 % bolus infusion 500 mL  500 mL IntraVENous ONCE Jamil Brown  mL/hr at 09/09/20 1735 500 mL at 09/09/20 1735    naloxone (NARCAN) injection 2 mg  2 mg IntraVENous NOW Jamil Brown DO         Current Outpatient Medications   Medication Sig Dispense Refill    traMADol (ULTRAM) 50 mg tablet Take 1 Tab by mouth every six (6) hours as needed for Pain. Max Daily Amount: 200 mg. 12 Tab 0    albuterol (PROVENTIL HFA, VENTOLIN HFA, PROAIR HFA) 90 mcg/actuation inhaler Take  by inhalation.  benzonatate (TESSALON) 100 mg capsule Take 100 mg by mouth three (3) times daily as needed for Cough.  carboxymethylcellulose sodium 1 % dlgl Apply  to eye.  cycloSPORINE (RESTASIS) 0.05 % ophthalmic emulsion Administer 1 Drop to both eyes two (2) times a day.  guaiFENesin (ROBITUSSIN) 100 mg/5 mL liquid Take 200 mg by mouth three (3) times daily as needed for Cough.       hydrOXYzine (VISTARIL) 25 mg capsule Take 25 mg by mouth three (3) times daily as needed for Itching.  levothyroxine (SYNTHROID) 50 mcg tablet Take 50 mcg by mouth Daily (before breakfast).  methylPREDNISolone (MEDROL) 4 mg tablet Take 4 mg by mouth daily (with breakfast).  metroNIDAZOLE (FLAGYL) 250 mg tablet Take  by mouth three (3) times daily.  nicotine (NICODERM CQ) 21 mg/24 hr 1 Patch by TransDERmal route every twenty-four (24) hours.  omeprazole (PRILOSEC) 20 mg capsule Take 20 mg by mouth daily.  rOPINIRole (REQUIP) 0.25 mg tablet Take 0.25 mg by mouth three (3) times daily.  spironolactone (ALDACTONE) 25 mg tablet Take  by mouth daily.  traZODone (DESYREL) 100 mg tablet Take 100 mg by mouth nightly.  TOPIRAMATE (TOPAMAX PO) Take  by mouth. Past History     Past Medical History:  Past Medical History:   Diagnosis Date    Chronic back pain     Diabetes ([facility] [facility]     Drug abuse ([facility] [facility]     Hepatic cirrhosis ([facility] [address] [facility]     Hepatitis C     Heroin abuse ([facility] [address] [facility]     Pain management     Restless leg syndrome     Sciatica     Spleen enlarged     Thyroid disease        Past Surgical History:  Past Surgical History:   Procedure Laterality Date    HX CHOLECYSTECTOMY      HX GYN      hysterectomy    HX HEENT      T&A    HX ORTHOPAEDIC      Bunion, left hand and right arm abscess removal       Family History:  History reviewed. No pertinent family history. Social History:  Social History     Tobacco Use    Smoking status: Current Every Day Smoker    Smokeless tobacco: Never Used   Substance Use Topics    Alcohol use: No    Drug use: Yes       Allergies: Allergies   Allergen Reactions    Erythromycin Hives    Penicillins Hives    Tetracycline Hives         Review of Systems   Review of Systems   Constitutional: Negative for chills and fever. HENT: Negative for congestion, ear pain, sinus pain and sore throat. Eyes: Negative for pain and visual disturbance. Respiratory: Negative for cough and shortness of breath. Cardiovascular: Negative for chest pain and leg swelling. Gastrointestinal: Negative for abdominal pain, constipation, diarrhea, nausea and vomiting. Genitourinary: Negative for dysuria, hematuria, vaginal bleeding and vaginal discharge. Musculoskeletal: Negative for back pain and neck pain. Skin: Negative for rash and wound. Neurological: Negative for dizziness, tremors, weakness, light-headedness and numbness. All other systems reviewed and are negative. Physical Exam     Vitals:    09/09/20 1628 09/09/20 1650 09/09/20 1751   BP: 106/48 148/63    Pulse: 68 65    Resp: 9 17    Temp: 98 °F (36.7 °C)     SpO2: (!) 88% 100% 94%     Physical Exam    Nursing note and vitals reviewed    Constitutional: Elderly -American female, drowsy but arousable  head: Normocephalic, Atraumatic  Eyes: Pupils are point, round, and reactive to light, EOMI  Neck: Supple, non-tender  Cardiovascular: Regular rate and rhythm, no murmurs, rubs, or gallops, + 2 radial pulses bilaterally  Chest: Normal work of breathing and chest excursion bilaterally  Lungs: Clear to ausculation bilaterally, no wheezes, no rhonchi  Abdomen: Soft, non tender, non distended, normoactive bowel sounds  Back: No evidence of trauma or deformity  Extremities: No evidence of trauma or deformity, no LE edema.  No streaking erythema, vesicular lesions, ulcerations or bulla  Skin: Warm and dry, normal cap refill  Neuro: Drowsy, CN intact, normal speech, moving all 4 extremities freely and symmetrically         Diagnostic Study Results     Labs -     Recent Results (from the past 12 hour(s))   EKG, 12 LEAD, INITIAL    Collection Time: 09/09/20  5:08 PM   Result Value Ref Range    Ventricular Rate 69 BPM    Atrial Rate 69 BPM    P-R Interval 168 ms    QRS Duration 102 ms    Q-T Interval 440 ms    QTC Calculation (Bezet) 471 ms    Calculated P Axis 45 degrees    Calculated R Axis 19 degrees    Calculated T Axis 56 degrees    Diagnosis       Normal sinus rhythm  Prolonged QT  Abnormal ECG  When compared with ECG of 28-JUL-2011 11:33,  Nonspecific T wave abnormality, worse in Anterior leads     METABOLIC PANEL, COMPREHENSIVE    Collection Time: 09/09/20  5:10 PM   Result Value Ref Range    Sodium 137 136 - 145 mmol/L    Potassium 4.7 3.5 - 5.5 mmol/L    Chloride 101 100 - 111 mmol/L    CO2 30 21 - 32 mmol/L    Anion gap 6 3.0 - 18 mmol/L    Glucose 154 (H) 74 - 99 mg/dL    BUN 25 (H) 7.0 - 18 MG/DL    Creatinine 1.47 (H) 0.6 - 1.3 MG/DL    BUN/Creatinine ratio 17 12 - 20      GFR est AA 43 (L) >60 ml/min/1.73m2    GFR est non-AA 35 (L) >60 ml/min/1.73m2    Calcium 9.2 8.5 - 10.1 MG/DL    Bilirubin, total 0.4 0.2 - 1.0 MG/DL    ALT (SGPT) 29 13 - 56 U/L    AST (SGOT) 35 10 - 38 U/L    Alk. phosphatase 128 (H) 45 - 117 U/L    Protein, total 6.6 6.4 - 8.2 g/dL    Albumin 3.3 (L) 3.4 - 5.0 g/dL    Globulin 3.3 2.0 - 4.0 g/dL    A-G Ratio 1.0 0.8 - 1.7     CARDIAC PANEL,(CK, CKMB & TROPONIN)    Collection Time: 09/09/20  5:10 PM   Result Value Ref Range    CK - MB 7.4 (H) <3.6 ng/ml    CK-MB Index 1.9 0.0 - 4.0 %     (H) 26 - 192 U/L    Troponin-I, QT <0.02 0.0 - 0.045 NG/ML   CBC WITH AUTOMATED DIFF    Collection Time: 09/09/20  5:30 PM   Result Value Ref Range    WBC 2.2 (L) 4.6 - 13.2 K/uL    RBC 3.99 (L) 4.20 - 5.30 M/uL    HGB 10.8 (L) 12.0 - 16.0 g/dL    HCT 32.9 (L) 35.0 - 45.0 %    MCV 82.5 74.0 - 97.0 FL    MCH 27.1 24.0 - 34.0 PG    MCHC 32.8 31.0 - 37.0 g/dL    RDW 15.8 (H) 11.6 - 14.5 %    PLATELET PENDING K/uL    MPV 9.9 9.2 - 11.8 FL    NEUTROPHILS 80 (H) 40 - 73 %    LYMPHOCYTES 13 (L) 21 - 52 %    MONOCYTES 7 3 - 10 %    EOSINOPHILS 0 0 - 5 %    BASOPHILS 0 0 - 2 %    ABS. NEUTROPHILS 1.8 1.8 - 8.0 K/UL    ABS. LYMPHOCYTES 0.3 (L) 0.9 - 3.6 K/UL    ABS. MONOCYTES 0.2 0.05 - 1.2 K/UL    ABS. EOSINOPHILS 0.0 0.0 - 0.4 K/UL    ABS.  BASOPHILS 0.0 0.0 - 0.1 K/UL    DF AUTOMATED     POC G3    Collection Time: 09/09/20  5:40 PM   Result Value Ref Range    Device: ROOM AIR      pH (POC) 7.34 (L) 7.35 - 7.45      pCO2 (POC) 51.0 (H) 35.0 - 45.0 MMHG    pO2 (POC) 60 (L) 80 - 100 MMHG    HCO3 (POC) 27.2 (H) 22 - 26 MMOL/L    sO2 (POC) 88 (L) 92 - 97 %    Base excess (POC) 1 mmol/L    Allens test (POC) YES      Total resp. rate 11      Site LEFT RADIAL      Specimen type (POC) ARTERIAL      Performed by Slim Acosta        Radiologic Studies -   XR CHEST PORT    (Results Pending)     CT Results  (Last 48 hours)    None        CXR Results  (Last 48 hours)    None            Medical Decision Making   I am the first provider for this patient. I reviewed the vital signs, available nursing notes, past medical history, past surgical history, family history and social history. Vital Signs-Reviewed the patient's vital signs. Pulse Oximetry Analysis -88 % on room air    Cardiac Monitor:  Rate: 68 bpm  Rhythm: Regular    EKG interpretation: (Preliminary)  4:41 PM   Normal sinus rhythm at 69 beats minute. CO interval 168 ms.  ms. QTc 471 ms. No acute ST elevation    Records Reviewed: Nursing Notes and Old Medical Records    Provider Notes:   72 y.o. female presenting after presumed methadone overdose. Given 1.5 mg of Narcan. On presentation, patient noted to be drowsy however arousable. .  Pupils are pinpoint. Hypoxic at 88% on room air. Patient placed on supplemental oxygenation. Will give additional 2 mg of Narcan. Will obtain EKG. And check ABG. Procedures:  Procedures    ED Course:   4:41 PM   Initial assessment performed. The patients presenting problems have been discussed, and they are in agreement with the care plan formulated and outlined with them. I have encouraged them to ask questions as they arise throughout their visit. 5:19 PM  And became very combative, agitated after receiving 2 mg of Narcan. Unable to be verbally de-escalated. Requiring restraints for personal safety.     6:00 PM  Despite being more awake, patient continues to be hypoxic on room air. ABG confirming hypoxia and hypercarbia, likely secondary to initial apnea from the overdose. Will admit for supplemental oxygenation. Diagnosis and Disposition     5:22 PM  I have spent 90 minutes of critical care time involved in lab review, consultations with specialist, family decision-making, and documentation. During this entire length of time I was immediately available to the patient. Critical Care: The reason for providing this level of medical care for this critically ill patient was due a critical illness that impaired one or more vital organ systems such that there was a high probability of imminent or life threatening deterioration in the patients condition. This care involved high complexity decision making to assess, manipulate, and support vital system functions, to treat this degreee vital organ system failure and to prevent further life threatening deterioration of the patients condition. Core Measures:  For Hospitalized Patients:    1. Hospitalization Decision Time:  The decision to hospitalize the patient was made by Britney Brown DO at 6:00 PM on 9/9/2020    2. Aspirin: Aspirin was not given because the patient did not present with a stroke at the time of their Emergency Department evaluation    5:59 PM  Patient is being admitted to the hospital by Remedios Watkins MD. The results of their tests and reasons for their admission have been discussed with them and/or available family. They convey agreement and understanding for the need to be admitted and for their admission diagnosis. CONDITIONS ON ADMISSION:  Sepsis is not present at the time of admission. MRSA is not present at the time of admission. Wound infection is not present at the time of admission. Pressure Ulcer is not present at the time of admission. CLINICAL IMPRESSION:    1. Renal insufficiency    2. Dehydration    3. Hypercarbia    4. Hypoxia    5.  Accidental methadone overdose, initial encounter [facility])      ____________________________________     Please note that this dictation was completed with [employer], the computer voice recognition software. Quite often unanticipated grammatical, syntax, homophones, and other interpretive errors are inadvertently transcribed by the computer software. Please disregard these errors. Please excuse any errors that have escaped final proofreading.

## 2020-09-10 ENCOUNTER — APPOINTMENT (OUTPATIENT)
Dept: NON INVASIVE DIAGNOSTICS | Age: 70
DRG: 917 | End: 2020-09-10
Attending: HOSPITALIST
Payer: MEDICARE

## 2020-09-10 PROBLEM — D69.6 THROMBOCYTOPENIA (HCC): Status: ACTIVE | Noted: 2020-09-10

## 2020-09-10 LAB
ALBUMIN SERPL-MCNC: 2.9 G/DL (ref 3.4–5)
ALBUMIN/GLOB SERPL: 1 {RATIO} (ref 0.8–1.7)
ALP SERPL-CCNC: 111 U/L (ref 45–117)
ALT SERPL-CCNC: 38 U/L (ref 13–56)
ANION GAP SERPL CALC-SCNC: 3 MMOL/L (ref 3–18)
ARTERIAL PATENCY WRIST A: YES
AST SERPL-CCNC: 128 U/L (ref 10–38)
ATRIAL RATE: 69 BPM
AV PEAK GRADIENT: 41.57 MMHG
AV VELOCITY RATIO: 0.73
AV VTI RATIO: 0.8
BASE EXCESS BLD CALC-SCNC: 0 MMOL/L
BASOPHILS # BLD: 0 K/UL (ref 0–0.1)
BASOPHILS NFR BLD: 0 % (ref 0–2)
BDY SITE: ABNORMAL
BILIRUB SERPL-MCNC: 0.6 MG/DL (ref 0.2–1)
BUN SERPL-MCNC: 24 MG/DL (ref 7–18)
BUN/CREAT SERPL: 26 (ref 12–20)
CALCIUM SERPL-MCNC: 8.3 MG/DL (ref 8.5–10.1)
CALCULATED P AXIS, ECG09: 45 DEGREES
CALCULATED R AXIS, ECG10: 19 DEGREES
CALCULATED T AXIS, ECG11: 56 DEGREES
CHLORIDE SERPL-SCNC: 108 MMOL/L (ref 100–111)
CO2 SERPL-SCNC: 27 MMOL/L (ref 21–32)
CREAT SERPL-MCNC: 0.92 MG/DL (ref 0.6–1.3)
DIAGNOSIS, 93000: NORMAL
DIFFERENTIAL METHOD BLD: ABNORMAL
ECHO AO ASC DIAM: 3.65 CM
ECHO AO ROOT DIAM: 3.39 CM
ECHO AR MAX VEL PISA: 322.36 CM/S
ECHO AV AREA PEAK VELOCITY: 2.1 CM2
ECHO AV AREA VTI: 2.2 CM2
ECHO AV AREA/BSA PEAK VELOCITY: 1.1 CM2/M2
ECHO AV AREA/BSA VTI: 1.2 CM2/M2
ECHO AV MEAN GRADIENT: 3.3 MMHG
ECHO AV MEAN VELOCITY: 0.83 M/S
ECHO AV PEAK GRADIENT: 7.2 MMHG
ECHO AV PEAK VELOCITY: 134.17 CM/S
ECHO AV REGURGITANT PHT: 827.2 CM
ECHO AV VTI: 32 CM
ECHO LA MAJOR AXIS: 5 CM
ECHO LA MINOR AXIS: 2.7 CM
ECHO LA VOL 2C: 80.69 ML (ref 22–52)
ECHO LA VOL 4C: 92.82 ML (ref 22–52)
ECHO LA VOL BP: 100.75 ML (ref 22–52)
ECHO LA VOL/BSA BIPLANE: 54.36 ML/M2 (ref 16–28)
ECHO LA VOLUME INDEX A2C: 43.54 ML/M2 (ref 16–28)
ECHO LA VOLUME INDEX A4C: 50.08 ML/M2 (ref 16–28)
ECHO LV E' LATERAL VELOCITY: 10 CM/S
ECHO LV E' SEPTAL VELOCITY: 9 CM/S
ECHO LV EDV A2C: 120.9 ML
ECHO LV EDV A4C: 117.9 ML
ECHO LV EDV BP: 124.5 ML (ref 56–104)
ECHO LV EDV INDEX A4C: 63.6 ML/M2
ECHO LV EDV INDEX BP: 67.2 ML/M2
ECHO LV EDV NDEX A2C: 65.2 ML/M2
ECHO LV EDV TEICHHOLZ: 0.87 ML
ECHO LV EJECTION FRACTION A2C: 63 %
ECHO LV EJECTION FRACTION A4C: 63 %
ECHO LV EJECTION FRACTION BIPLANE: 62.1 % (ref 55–100)
ECHO LV ESV A2C: 44.2 ML
ECHO LV ESV A4C: 43.5 ML
ECHO LV ESV BP: 47.2 ML (ref 19–49)
ECHO LV ESV INDEX A2C: 23.8 ML/M2
ECHO LV ESV INDEX A4C: 23.5 ML/M2
ECHO LV ESV INDEX BP: 25.5 ML/M2
ECHO LV ESV TEICHHOLZ: 0.34 ML
ECHO LV INTERNAL DIMENSION DIASTOLIC: 5.32 CM (ref 3.9–5.3)
ECHO LV INTERNAL DIMENSION SYSTOLIC: 3.58 CM
ECHO LV IVSD: 0.96 CM (ref 0.6–0.9)
ECHO LV MASS 2D: 152.2 G (ref 67–162)
ECHO LV MASS INDEX 2D: 82.1 G/M2 (ref 43–95)
ECHO LV POSTERIOR WALL DIASTOLIC: 0.65 CM (ref 0.6–0.9)
ECHO LVOT DIAM: 1.91 CM
ECHO LVOT PEAK GRADIENT: 3.9 MMHG
ECHO LVOT PEAK VELOCITY: 98.27 CM/S
ECHO LVOT SV: 71.4 ML
ECHO LVOT VTI: 24.98 CM
ECHO MV A VELOCITY: 49.56 CM/S
ECHO MV AREA PHT: 2.2 CM2
ECHO MV AREA PISA: 0.2 CM2
ECHO MV E DECELERATION TIME (DT): 349.5 MS
ECHO MV E VELOCITY: 71.56 CM/S
ECHO MV E/A RATIO: 1.44
ECHO MV E/E' LATERAL: 7.16
ECHO MV E/E' RATIO (AVERAGED): 7.55
ECHO MV E/E' SEPTAL: 7.95
ECHO MV EROA PISA: 0.2 CM2
ECHO MV MEAN INFLOW VELOCITY: 4.14 M/S
ECHO MV PRESSURE HALF TIME (PHT): 101.3 MS
ECHO MV REGURGITANT PEAK GRADIENT: ABNORMAL MMHG
ECHO MV REGURGITANT PEAK VELOCITY: 527.65 CM/S
ECHO MV REGURGITANT RADIUS PISA: 0.64 CM
ECHO MV REGURGITANT VOLUME: 37.23 CC
ECHO MV REGURGITANT VTIA: 199.1 CM
ECHO MV REGURGITANT VTIA: 203.58 CM
ECHO PV REGURGITANT MAX VELOCITY: 519.88 CM/S
ECHO PV REGURGITANT MAX VELOCITY: 535.41 CM/S
ECHO PV REGURGITANT MAX VELOCITY: 88.17 CM/S
ECHO RA AREA 4C: 19.16 CM2
ECHO RV INTERNAL DIMENSION: 4.44 CM
ECHO TV MEAN GRADIENT: 76.18 MMHG
ECHO TV REGURGITANT MAX VELOCITY: 290.85 CM/S
ECHO TV REGURGITANT PEAK GRADIENT: 33.8 MMHG
EOSINOPHIL # BLD: 0 K/UL (ref 0–0.4)
EOSINOPHIL NFR BLD: 0 % (ref 0–5)
ERYTHROCYTE [DISTWIDTH] IN BLOOD BY AUTOMATED COUNT: 15.6 % (ref 11.6–14.5)
EST. AVERAGE GLUCOSE BLD GHB EST-MCNC: 105 MG/DL
GAS FLOW.O2 O2 DELIVERY SYS: ABNORMAL L/MIN
GAS FLOW.O2 SETTING OXYMISER: 2 L/M
GLOBULIN SER CALC-MCNC: 2.9 G/DL (ref 2–4)
GLUCOSE BLD STRIP.AUTO-MCNC: 131 MG/DL (ref 70–110)
GLUCOSE BLD STRIP.AUTO-MCNC: 71 MG/DL (ref 70–110)
GLUCOSE BLD STRIP.AUTO-MCNC: 94 MG/DL (ref 70–110)
GLUCOSE BLD STRIP.AUTO-MCNC: 96 MG/DL (ref 70–110)
GLUCOSE BLD STRIP.AUTO-MCNC: 97 MG/DL (ref 70–110)
GLUCOSE SERPL-MCNC: 86 MG/DL (ref 74–99)
HBA1C MFR BLD: 5.3 % (ref 4.2–5.6)
HCO3 BLD-SCNC: 28 MMOL/L (ref 22–26)
HCT VFR BLD AUTO: 32.7 % (ref 35–45)
HGB BLD-MCNC: 10.5 G/DL (ref 12–16)
LVFS 2D: 32.81 %
LVOT MG: 1.78 MMHG
LVOT MV: 0.59 CM/S
LVSV (MOD BI): 40.23 ML
LVSV (MOD SINGLE 4C): 38.77 ML
LVSV (MOD SINGLE): 39.94 ML
LVSV (TEICH): 43.3 ML
LYMPHOCYTES # BLD: 0.5 K/UL (ref 0.9–3.6)
LYMPHOCYTES NFR BLD: 20 % (ref 21–52)
MAGNESIUM SERPL-MCNC: 2 MG/DL (ref 1.6–2.6)
MCH RBC QN AUTO: 26.7 PG (ref 24–34)
MCHC RBC AUTO-ENTMCNC: 32.1 G/DL (ref 31–37)
MCV RBC AUTO: 83.2 FL (ref 74–97)
MONOCYTES # BLD: 0.2 K/UL (ref 0.05–1.2)
MONOCYTES NFR BLD: 9 % (ref 3–10)
MV DEC SLOPE: 2.05
NEUTS SEG # BLD: 1.6 K/UL (ref 1.8–8)
NEUTS SEG NFR BLD: 71 % (ref 40–73)
O2/TOTAL GAS SETTING VFR VENT: 28 %
P-R INTERVAL, ECG05: 168 MS
PCO2 BLD: 67.3 MMHG (ref 35–45)
PH BLD: 7.23 [PH] (ref 7.35–7.45)
PISA AR MAX VEL: 322.36 CM/S
PLATELET # BLD AUTO: 43 K/UL (ref 135–420)
PMV BLD AUTO: 9.8 FL (ref 9.2–11.8)
PO2 BLD: 60 MMHG (ref 80–100)
POTASSIUM SERPL-SCNC: 4.3 MMOL/L (ref 3.5–5.5)
PROT SERPL-MCNC: 5.8 G/DL (ref 6.4–8.2)
PV END DIASTOLIC VELOCITY: 0.9 MMHG
Q-T INTERVAL, ECG07: 440 MS
QRS DURATION, ECG06: 102 MS
QTC CALCULATION (BEZET), ECG08: 471 MS
RBC # BLD AUTO: 3.93 M/UL (ref 4.2–5.3)
SAO2 % BLD: 84 % (ref 92–97)
SERVICE CMNT-IMP: ABNORMAL
SODIUM SERPL-SCNC: 138 MMOL/L (ref 136–145)
SPECIMEN TYPE: ABNORMAL
VENTRICULAR RATE, ECG03: 69 BPM
WBC # BLD AUTO: 2.3 K/UL (ref 4.6–13.2)

## 2020-09-10 PROCEDURE — C9113 INJ PANTOPRAZOLE SODIUM, VIA: HCPCS | Performed by: HOSPITALIST

## 2020-09-10 PROCEDURE — 74011000250 HC RX REV CODE- 250: Performed by: INTERNAL MEDICINE

## 2020-09-10 PROCEDURE — 82962 GLUCOSE BLOOD TEST: CPT

## 2020-09-10 PROCEDURE — 77030013140 HC MSK NEB VYRM -A

## 2020-09-10 PROCEDURE — 65610000006 HC RM INTENSIVE CARE

## 2020-09-10 PROCEDURE — 74011250636 HC RX REV CODE- 250/636: Performed by: INTERNAL MEDICINE

## 2020-09-10 PROCEDURE — 36415 COLL VENOUS BLD VENIPUNCTURE: CPT

## 2020-09-10 PROCEDURE — 74011000258 HC RX REV CODE- 258: Performed by: INTERNAL MEDICINE

## 2020-09-10 PROCEDURE — 94640 AIRWAY INHALATION TREATMENT: CPT

## 2020-09-10 PROCEDURE — 80053 COMPREHEN METABOLIC PANEL: CPT

## 2020-09-10 PROCEDURE — 74011250636 HC RX REV CODE- 250/636: Performed by: HOSPITALIST

## 2020-09-10 PROCEDURE — 82803 BLOOD GASES ANY COMBINATION: CPT

## 2020-09-10 PROCEDURE — 85025 COMPLETE CBC W/AUTO DIFF WBC: CPT

## 2020-09-10 PROCEDURE — 77030035694 HC MSK BIPAP FLL FAC PERFMAX PHIL -B

## 2020-09-10 PROCEDURE — 93306 TTE W/DOPPLER COMPLETE: CPT

## 2020-09-10 PROCEDURE — 94660 CPAP INITIATION&MGMT: CPT

## 2020-09-10 PROCEDURE — 36600 WITHDRAWAL OF ARTERIAL BLOOD: CPT

## 2020-09-10 PROCEDURE — 74011000250 HC RX REV CODE- 250: Performed by: HOSPITALIST

## 2020-09-10 PROCEDURE — 77010033678 HC OXYGEN DAILY

## 2020-09-10 PROCEDURE — 83735 ASSAY OF MAGNESIUM: CPT

## 2020-09-10 RX ORDER — IPRATROPIUM BROMIDE AND ALBUTEROL SULFATE 2.5; .5 MG/3ML; MG/3ML
3 SOLUTION RESPIRATORY (INHALATION) 3 TIMES DAILY
Status: DISCONTINUED | OUTPATIENT
Start: 2020-09-10 | End: 2020-09-10

## 2020-09-10 RX ORDER — PREGABALIN 75 MG/1
75 CAPSULE ORAL 2 TIMES DAILY
COMMUNITY

## 2020-09-10 RX ORDER — ESCITALOPRAM OXALATE 20 MG/1
10 TABLET ORAL DAILY
COMMUNITY
End: 2020-09-17

## 2020-09-10 RX ORDER — IPRATROPIUM BROMIDE AND ALBUTEROL SULFATE 2.5; .5 MG/3ML; MG/3ML
3 SOLUTION RESPIRATORY (INHALATION)
Status: DISCONTINUED | OUTPATIENT
Start: 2020-09-10 | End: 2020-09-15

## 2020-09-10 RX ORDER — PRAZOSIN HYDROCHLORIDE 2 MG/1
2 CAPSULE ORAL
COMMUNITY

## 2020-09-10 RX ORDER — LORAZEPAM 2 MG/ML
1 INJECTION INTRAMUSCULAR
Status: DISCONTINUED | OUTPATIENT
Start: 2020-09-10 | End: 2020-09-12

## 2020-09-10 RX ORDER — METHADONE HYDROCHLORIDE 10 MG/ML
65 CONCENTRATE ORAL DAILY
COMMUNITY
End: 2020-09-17

## 2020-09-10 RX ORDER — ARFORMOTEROL TARTRATE 15 UG/2ML
15 SOLUTION RESPIRATORY (INHALATION)
Status: DISCONTINUED | OUTPATIENT
Start: 2020-09-10 | End: 2020-09-18 | Stop reason: HOSPADM

## 2020-09-10 RX ORDER — IBUPROFEN 200 MG
1 TABLET ORAL DAILY
Status: DISCONTINUED | OUTPATIENT
Start: 2020-09-10 | End: 2020-09-18 | Stop reason: HOSPADM

## 2020-09-10 RX ORDER — LORAZEPAM 2 MG/ML
1 INJECTION INTRAMUSCULAR
Status: DISCONTINUED | OUTPATIENT
Start: 2020-09-10 | End: 2020-09-10 | Stop reason: ALTCHOICE

## 2020-09-10 RX ORDER — BUDESONIDE 0.5 MG/2ML
500 INHALANT ORAL
Status: DISCONTINUED | OUTPATIENT
Start: 2020-09-10 | End: 2020-09-18 | Stop reason: HOSPADM

## 2020-09-10 RX ADMIN — ARFORMOTEROL TARTRATE 15 MCG: 15 SOLUTION RESPIRATORY (INHALATION) at 15:00

## 2020-09-10 RX ADMIN — ARFORMOTEROL TARTRATE 15 MCG: 15 SOLUTION RESPIRATORY (INHALATION) at 19:54

## 2020-09-10 RX ADMIN — Medication 10 ML: at 22:01

## 2020-09-10 RX ADMIN — PROMETHAZINE HYDROCHLORIDE 12.5 MG: 25 INJECTION, SOLUTION INTRAMUSCULAR; INTRAVENOUS at 10:18

## 2020-09-10 RX ADMIN — SODIUM CHLORIDE 40 MG: 9 INJECTION, SOLUTION INTRAMUSCULAR; INTRAVENOUS; SUBCUTANEOUS at 22:01

## 2020-09-10 RX ADMIN — SODIUM CHLORIDE 0.4 MG/HR: 900 INJECTION, SOLUTION INTRAVENOUS at 01:38

## 2020-09-10 RX ADMIN — IPRATROPIUM BROMIDE AND ALBUTEROL SULFATE 3 ML: .5; 3 SOLUTION RESPIRATORY (INHALATION) at 19:54

## 2020-09-10 RX ADMIN — IPRATROPIUM BROMIDE AND ALBUTEROL SULFATE 3 ML: .5; 3 SOLUTION RESPIRATORY (INHALATION) at 15:00

## 2020-09-10 RX ADMIN — BUDESONIDE 500 MCG: 0.5 INHALANT RESPIRATORY (INHALATION) at 19:54

## 2020-09-10 RX ADMIN — LORAZEPAM 1 MG: 2 INJECTION, SOLUTION INTRAMUSCULAR; INTRAVENOUS at 09:58

## 2020-09-10 RX ADMIN — LORAZEPAM 1 MG: 2 INJECTION INTRAMUSCULAR; INTRAVENOUS at 16:07

## 2020-09-10 RX ADMIN — DEXMEDETOMIDINE HYDROCHLORIDE 0.4 MCG/KG/HR: 100 INJECTION, SOLUTION INTRAVENOUS at 09:59

## 2020-09-10 RX ADMIN — NALXONE HYDROCHLORIDE 0.4 MG: 0.4 INJECTION INTRAMUSCULAR; INTRAVENOUS; SUBCUTANEOUS at 15:55

## 2020-09-10 RX ADMIN — ONDANSETRON 4 MG: 2 INJECTION INTRAMUSCULAR; INTRAVENOUS at 10:07

## 2020-09-10 NOTE — PROGRESS NOTES
Spoke with Chico Brice at [facility] by phone, she stated patient received her Methadone adminstered in front of the staff at their clinic. Patient not supposed to have take home Methadone, so not sure if she has taken OD on Methadone or any other substance  prior to this admission. Need to obtain collateral info and will defer acute medical management to the primary team. When patient is more stable medically, Psychiatrist can see to evalaute for need for inpatient Psych admission.

## 2020-09-10 NOTE — PROGRESS NOTES
OT NOTE:    Occupational therapy orders received. Chart review completed. Attempted to complete OT evaluation this afternoon. Pt's RN Aurelio requested to hold OT evaluation at this time due to pt not being appropriate for OT today. Will attempt to complete OT evaluation at next scheduled date as able.     Thank you for this referral.   Tyrell Queen OTR/[facility]

## 2020-09-10 NOTE — PROGRESS NOTES
Called about bradycardia  Instructed to give a dose of narcane  And have atropine at bedside  May need transfer to icu

## 2020-09-10 NOTE — CONSULTS
[facility] Consult Note      Patient: Low Quijano MRN: [id number]  SSN: [id number]    YOB: 1950  Age: 72 y.o. Sex: female        Date of Consultation: 09/10/2020  Referring Physician: Dr. Luann Knox  Reason for Consultation: abnormal echocardiogram    HPI:  I was asked by Dr. Luann Knox to see this patient for abnormal echocardiogram possible tricuspid valve vegetation. Low Quijano  Is a 41-year-old patient admitted in the hospital with altered mental status and methadone overdose. Patient is unable to provide history at this point I have called her  and discussed with him. According to patient's  she has not done any IV drug abuse from last 2 years but have sniffed heroin 7 days ago. Patient underwent echocardiogram today for bradycardia and found to have possible anterior leaflet of tricuspid valve degenerative changes versus small vegetation. There is no old echocardiogram images available to compare.       Past Medical History:   Diagnosis Date    Chronic back pain     [facility]     Drug abuse ([facility]     Hepatic cirrhosis ([facility]     Hepatitis C     Heroin abuse ([facility]     Pain management     Restless leg syndrome     Sciatica     Spleen enlarged     Thyroid disease      Past Surgical History:   Procedure Laterality Date    HX CHOLECYSTECTOMY      HX GYN      hysterectomy    HX HEENT      T&A    HX ORTHOPAEDIC      Bunion, left hand and right arm abscess removal     Current Facility-Administered Medications   Medication Dose Route Frequency    nicotine (NICODERM CQ) 14 mg/24 hr patch 1 Patch  1 Patch TransDERmal DAILY    budesonide (PULMICORT) 500 mcg/2 ml nebulizer suspension  500 mcg Nebulization BID RT    albuterol-ipratropium (DUO-NEB) 2.5 MG-0.5 MG/3 ML  3 mL Nebulization TID RT    promethazine (PHENERGAN) 12.5 mg in 0.9% sodium chloride 50 mL IVPB  12.5 mg IntraVENous Q6H PRN    LORazepam (ATIVAN) injection 1 mg  1 mg IntraVENous Q6H PRN    arformoteroL (BROVANA) neb solution 15 mcg  15 mcg Nebulization BID RT    naloxone (NARCAN) 10 mg in 0.9% sodium chloride 250 mL infusion  0.05-2 mg/hr IntraVENous TITRATE    sodium chloride (NS) flush 5-40 mL  5-40 mL IntraVENous Q8H    sodium chloride (NS) flush 5-40 mL  5-40 mL IntraVENous PRN    acetaminophen (TYLENOL) tablet 650 mg  650 mg Oral Q6H PRN    Or    acetaminophen (TYLENOL) suppository 650 mg  650 mg Rectal Q6H PRN    bisacodyL (DULCOLAX) suppository 10 mg  10 mg Rectal DAILY PRN    promethazine (PHENERGAN) tablet 12.5 mg  12.5 mg Oral Q6H PRN    Or    ondansetron (ZOFRAN) injection 4 mg  4 mg IntraVENous Q6H PRN    naloxone (NARCAN) injection 0.4 mg  0.4 mg IntraVENous EVERY 2 MINUTES AS NEEDED    0.9% sodium chloride infusion  75 mL/hr IntraVENous CONTINUOUS    insulin lispro (HUMALOG) injection   SubCUTAneous Q6H    glucose chewable tablet 16 g  4 Tab Oral PRN    glucagon (GLUCAGEN) injection 1 mg  1 mg IntraMUSCular PRN    ELECTROLYTE REPLACEMENT PROTOCOL - Potassium Standard Dosing   1 Each Other PRN    ELECTROLYTE REPLACEMENT PROTOCOL - Potassium Renal Dosing  1 Each Other PRN    ELECTROLYTE REPLACEMENT PROTOCOL - Magnesium   1 Each Other PRN    ELECTROLYTE REPLACEMENT PROTOCOL  - Phosphorus Renal Dosing  1 Each Other PRN    ELECTROLYTE REPLACEMENT PROTOCOL - Calcium   1 Each Other PRN    cloNIDine HCL (CATAPRES) tablet 0.1 mg  0.1 mg Oral Q4H PRN    pantoprazole (PROTONIX) 40 mg in 0.9% sodium chloride 10 mL injection  40 mg IntraVENous Q24H       Allergies and Intolerances: Allergies   Allergen Reactions    Erythromycin Hives    Penicillins Hives    Tetracycline Hives       Family History:   History reviewed. No pertinent family history. Social History:   She  reports that she has been smoking. She has never used smokeless tobacco.  She  reports no history of alcohol use.           Review of Systems    Limited due to altered mental status      Physical:   Patient Vitals for the past 6 hrs:   Temp Pulse Resp BP SpO2   09/10/20 1600 97.1 °F (36.2 °C) -- -- -- --   09/10/20 1451 -- -- -- 107/61 --   09/10/20 1100 98.3 °F (36.8 °C) (!) 50 16 107/61 96 %         Exam:   General Appearance: Comfortable, on CPAP  HEENT: CLARA. HEAD: Atraumatic  NECK: No JVD, no thyroidomeglay. CAROTIDS:  LUNGS: Clear bilaterally. HEART: S1+S2     ABD: Non-tender, BS Audible    EXT: No edema, and no cysnosis. VASCULAR EXAM: Pulses are intact. PSYCHIATRIC EXAM: comfortable  MUSCULOSKELETAL: Grossly no joint deformity. NEUROLOGICAL: limited due to AMS   Review of Data:   LABS:   Lab Results   Component Value Date/Time    WBC 2.3 (L) 09/10/2020 04:55 AM    HGB 10.5 (L) 09/10/2020 04:55 AM    HCT 32.7 (L) 09/10/2020 04:55 AM    PLATELET 43 (L) 81/09/8901 04:55 AM     Lab Results   Component Value Date/Time    Sodium 138 09/10/2020 04:55 AM    Potassium 4.3 09/10/2020 04:55 AM    Chloride 108 09/10/2020 04:55 AM    CO2 27 09/10/2020 04:55 AM    Glucose 86 09/10/2020 04:55 AM    BUN 24 (H) 09/10/2020 04:55 AM    Creatinine 0.92 09/10/2020 04:55 AM     No results found for: CHOL, CHOLX, CHLST, CHOLV, HDL, HDLP, LDL, LDLC, DLDLP, TGLX, TRIGL, TRIGP  No components found for: GPT  Lab Results   Component Value Date/Time    Hemoglobin A1c 5.3 09/09/2020 05:30 PM       RADIOLOGY:  CT Results  (Last 48 hours)               09/09/20 1905  CT HEAD WO CONT Final result    Impression:  IMPRESSION:       No acute intracranial abnormalities. Narrative:  EXAM: CT HEAD WO CONT       CLINICAL INDICATION/HISTORY: ams       COMPARISON: None. TECHNIQUE: Axial CT imaging of the head was performed without intravenous   contrast. Sagittal and coronal reconstructions are provided. One or more dose   reduction techniques were used on this CT: automated exposure control,   adjustment of the mAs and/or kVp according to patient size, and iterative   reconstruction techniques.   The specific techniques used on this CT exam have   been documented in the patient's electronic medical record. Digital Imaging and   Communications in Medicine (DICOM) format image data are available to   nonaffiliated external healthcare facilities or entities on a secure, media   free, reciprocally searchable basis with patient authorization for at least a   12-month period after this study           _______________       FINDINGS:       BRAIN AND POSTERIOR FOSSA: The sulci, folia, ventricles and basal cisterns are   within normal limits for the patient's age. There is no intracranial hemorrhage,   mass effect, or midline shift. There are scattered and confluent foci of decreased attenuation in the   periventricular white matter which are nonspecific but most likely sequelae of   chronic microvascular disease. EXTRA-AXIAL SPACES AND MENINGES: There are no abnormal extra-axial fluid   collections. CALVARIUM: Intact. SINUSES: Clear. OTHER: Prior bilateral lens replacements.       _______________               CXR Results  (Last 48 hours)               09/09/20 1813  XR CHEST PORT Final result    Impression:  IMPRESSION:       No active cardiopulmonary disease. Narrative:  EXAM: XR CHEST PORT       CLINICAL INDICATION/HISTORY: OD     > Additional: Altered mental status       COMPARISON: Correlation with rib series dated August 23, 2020       TECHNIQUE: Portable chest       _______________       FINDINGS:       SUPPORT DEVICES: None. HEART AND MEDIASTINUM: Normal size and contour. Normal pulmonary vasculature. LUNGS AND PLEURAL SPACES: Thin band of subsegmental atelectasis or scarring at   the right lung base, unchanged. The lungs are otherwise well expanded and clear. No focal consolidation, effusion, or pneumothorax. BONY THORAX AND SOFT TISSUES: No acute osseous abnormality.        _______________                   Cardiology Procedures:   Results for orders placed or performed during the hospital encounter of 09/09/20   EKG, 12 LEAD, INITIAL   Result Value Ref Range    Ventricular Rate 69 BPM    Atrial Rate 69 BPM    P-R Interval 168 ms    QRS Duration 102 ms    Q-T Interval 440 ms    QTC Calculation (Bezet) 471 ms    Calculated P Axis 45 degrees    Calculated R Axis 19 degrees    Calculated T Axis 56 degrees    Diagnosis       Normal sinus rhythm  Prolonged QT  Abnormal ECG  Confirmed by Mauricio Schultz MD, Shukri Loco ([id number]) on 9/10/2020 2:41:12 PM        Echo Results  (Last 48 hours)    None       Cardiolite (Tc-99m Sestamibi) stress test        Impression / Plan:    Patient Active Problem List   Diagnosis Code    Hypercarbia R06.89    Hypoxia R09.02    Methadone overdose ([facility]) T40.3X1A    LIONEL (acute kidney injury) (formerly [facility]) N17.9    Prolonged Q-T interval on ECG R94.31    Bradycardia R00.1    Encephalopathy acute G93.40    Thrombocytopenia (formerly [facility]) D69.6     Echo [id number] at [facility]    · Normal LV systolic function, ejection fraction 60 - 65% with   indeterminate diastolic function with normal left atrial pressure. No   regional wall motion abnormalities identified. Normal chamber size and   wall thickness. · Normal right ventricular chamber size and systolic function. · Left atrium is moderately dilated. · The right atrium is mildly dilated. · Focally calcified chordae tendineae. There is mitral annular   calcification. There is mild regurgitation. There is no significant mitral   stenosis. · The tricuspid valve is structurally normal. Moderate tricuspid   regurgitation with normal estimated pulmonary pressures. · The aortic valve is trileaflet. There is no significant aortic stenosis. Mild aortic insufficiency. · Pulmonic valve structure is normal. There is mild to moderate pulmonic   insufficiency. There is no significant pulmonic stenosis. No prior study for comparison. 9/10/2020    · LV: Estimated LVEF is 60 - 65%.  Normal cavity size, systolic function (ejection fraction normal) and diastolic function. Mild concentric hypertrophy. E/E' ratio is 7.55.  · LA: Moderately dilated left atrium. · RV: Mildly dilated right ventricle. · RA: Mildly dilated right atrium. · MV: Mitral valve non-specific thickening. Mild mitral annular calcification. Moderate mitral valve regurgitation is present. · TV: Non-specific thickening of the tricuspid valve. Moderate tricuspid valve regurgitation is present. · PA: Mild pulmonary hypertension. Pulmonary arterial systolic pressure is 44 mmHg. There is a small thickening /degenerative changes on tip of anterior leaflet of the tricuspid valve on ventricular side, without independent movement. I cannot exclude possible small vegetation. No previous echocardiogram done at [facility] on 10/11/2019 images available to compare.                assessment and plan     altered mental status  abnormal echocardiogram with possible degenerative changes in the  Anterior tricuspid leaflet, cannot exclude small vegetation. Bradycardia  Polysubstance abuse  Leukopenia   Thrombocytopenia    moderate TR  Moderate MR      Plan:  check TSH for bradycardia  I have discussed with patient's  according to him patient's , she has not done any intravenous drug abuse from last 2 years but have sniffed heroin 7 days ago.   I have requested to get echocardiographic images CD from [facility] done on 10/11/2019 to compare with today's echocardiogram.  Discussed with Dr. Carmenza Garzon.          Signed By: Merary Hilario MD     September 10, 2020

## 2020-09-10 NOTE — H&P
History & Physical    Patient: Evelina Payne MRN: [id number]  CSN: [id number]    YOB: 1950  Age: 72 y.o. Sex: female      DOA: 9/9/2020  Primary Care Provider:  Other, MD Boo      Assessment/Plan     Hospital Problems  Never Reviewed          Codes Class Noted POA    Hypercarbia ICD-10-CM: R06.89  ICD-9-CM: 786.09  9/9/2020 Unknown        Hypoxia ICD-10-CM: R09.02  ICD-9-CM: 799.02  9/9/2020 Unknown        [facility] ICD-10-CM: T40.3X1A  ICD-9-CM: 965.02, E980.0  9/9/2020 Unknown        LIONEL (acute kidney injury) ([facility] ICD-10-CM: N17.9  ICD-9-CM: 584.9  9/9/2020 Unknown        Prolonged Q-T interval on ECG ICD-10-CM: R94.31  ICD-9-CM: 794.31  9/9/2020 Unknown        Bradycardia ICD-10-CM: R00.1  ICD-9-CM: 427.89  9/9/2020 Unknown                Admit to icu     Respiratory   Hypercarbia and hypoxia   Due to drug overdose  Continue narcan as needed   Will repeat EKG   Case discussed with Dr. Regla Sommers   Methadone overdose   Cardiac monitoring ,   Optimize electrolytes   Narcan gtt   Sitter, si precaution     Prolong Q-T and bradycardia -per er reported down to 40s   cardiac monitoring   Repeat ekg tomorrow   Optimize electrolytes   icu electrolytes     Neuro   Acute encephalopathy toxic  CT head no acute process  Secondary due to drug overdose    Renal :    lionel   Will give some fluid  icu electrolytes replacement protocol     Endo   DM type II   Ssi     Gi : npo ppi     Heme: anemia   Continue monitoring h/h     Full code    Estimate  length of stay : 2-3 day/TBD     70 minutes of critical care time spent in the direct evaluation and treatment of this high risk patient. The reason for providing this level of medical care for this critically ill patient was due a critical illness that impaired one or more vital organ systems such that there was a high probability of imminent or life threatening deterioration in the patients condition.  This care involved high complexity decision making to assess, manipulate, and support vital system functions, to treat this degreee vital organ system failure and to prevent further life threatening deterioration of the patients condition. DVT : lovenox , ppi   CC: ams        HPI:     Destinee Fan is a 72 y.o. female with depression, heroin abuse on methadone was sent to ER per EMS due to mental status changes. Per ER reported, she became confused and suspected taking additional methadone. She was given 1.5 mg Narcan per EMS, her mental status and confusion got improving. She received another 2 mg Narcan in our ER, she become more awake and alert. She presented hypoxia in the ED, ABG was done. EKG indicated prolonged QT. CT head stat ordered, no acute process. Patient was seen and examined in ER, she is not a good historian, above history came from ER report.   She was alert oriented to herself only   Visit Vitals  /71   Pulse (!) 54   Temp 98 °F (36.7 °C)   Resp 12   SpO2 93%    O2 Flow Rate (L/min): 2 l/min O2 Device: Nasal cannula      Past Medical History:   Diagnosis Date    Chronic back pain     Diabetes ([address]     [facility] abuse ([facility]     Hepatic cirrhosis ([facility])     Hepatitis C     Heroin abuse ([facility]     Pain management     Restless leg syndrome     Sciatica     Spleen enlarged     Thyroid disease        Past Surgical History:   Procedure Laterality Date    HX CHOLECYSTECTOMY      HX GYN      hysterectomy    HX HEENT      T&A    HX ORTHOPAEDIC      Bunion, left hand and right arm abscess removal      Family History    Medical History Relation Name Comments   Diabetes Father       Hypertension Father       Diabetes Mother       Hypertension Mother       Diabetes Sister       Hypertension Sister         Relation Name Status Comments   Father        Mother        Sister              Social History     Socioeconomic History    Marital status:      Spouse name: Not on file  Number of children: Not on file    Years of education: Not on file    Highest education level: Not on file   Tobacco Use    Smoking status: Current Every Day Smoker    Smokeless tobacco: Never Used   Substance and Sexual Activity    Alcohol use: No    Drug use: Yes       Prior to Admission medications    Medication Sig Start Date End Date Taking? Authorizing Provider   traMADol (ULTRAM) 50 mg tablet Take 1 Tab by mouth every six (6) hours as needed for Pain. Max Daily Amount: 200 mg. 11/30/16   WESTLEY Manriquez   albuterol (PROVENTIL HFA, VENTOLIN HFA, PROAIR HFA) 90 mcg/actuation inhaler Take  by inhalation. Boo Hurt MD   benzonatate (TESSALON) 100 mg capsule Take 100 mg by mouth three (3) times daily as needed for Cough. Boo Hurt MD   carboxymethylcellulose sodium 1 % dlgl Apply  to eye. Boo Hurt MD   cycloSPORINE (RESTASIS) 0.05 % ophthalmic emulsion Administer 1 Drop to both eyes two (2) times a day. Boo Hurt MD   guaiFENesin (ROBITUSSIN) 100 mg/5 mL liquid Take 200 mg by mouth three (3) times daily as needed for Cough. Boo Hurt MD   hydrOXYzine (VISTARIL) 25 mg capsule Take 25 mg by mouth three (3) times daily as needed for Itching. Boo Hurt MD   levothyroxine (SYNTHROID) 50 mcg tablet Take 50 mcg by mouth Daily (before breakfast). Boo Hurt MD   methylPREDNISolone (MEDROL) 4 mg tablet Take 4 mg by mouth daily (with breakfast). Boo Hurt MD   metroNIDAZOLE (FLAGYL) 250 mg tablet Take  by mouth three (3) times daily. Boo Hurt MD   nicotine (NICODERM CQ) 21 mg/24 hr 1 Patch by TransDERmal route every twenty-four (24) hours. Boo Hurt MD   omeprazole (PRILOSEC) 20 mg capsule Take 20 mg by mouth daily. Boo Hurt MD   rOPINIRole (REQUIP) 0.25 mg tablet Take 0.25 mg by mouth three (3) times daily. Boo Hurt MD   spironolactone (ALDACTONE) 25 mg tablet Take  by mouth daily.     Boo Hurt MD   traZODone (DESYREL) 100 mg tablet Take 100 mg by mouth nightly. Boo Hurt MD   TOPIRAMATE (TOPAMAX PO) Take  by mouth. Boo Hurt MD       Allergies   Allergen Reactions    Erythromycin Hives    Penicillins Hives    Tetracycline Hives       Review of Systems  Unable to obtain due to ams         Physical Exam:     Physical Exam:  Visit Vitals  /71   Pulse (!) 54   Temp 98 °F (36.7 °C)   Resp 12   SpO2 93%    O2 Flow Rate (L/min): 2 l/min O2 Device: Nasal cannula    Temp (24hrs), Av °F (36.7 °C), Min:98 °F (36.7 °C), Max:98 °F (36.7 °C)    No intake/output data recorded. No intake/output data recorded. General:  Awake, not cooperative, in mild distress    Head:  Normocephalic, without obvious abnormality, atraumatic. Eyes:  Conjunctivae/corneas clear, sclera anicteric, PERRL, EOMs intact. Nose: Nares normal. No drainage or sinus tenderness. Throat: Lips, mucosa, and tongue normal. .   Neck: Supple, symmetrical, trachea midline, no adenopathy. Lungs:   Clear to auscultation bilaterally. Heart:  Regular rate and rhythm, S1, S2 normal, no murmur, click, rub or gallop. Abdomen: Soft, non-tender. Bowel sounds normal. No masses,  No organomegaly. Extremities: Extremities normal, atraumatic, no cyanosis or edema. Pulses: 2+ and symmetric all extremities. Skin: Skin color-pink, texture, turgor normal. Tattoo noted  .  Capillary refill normal    Neurologic: Confused and tremor noted        Labs Reviewed:    BMP:   Lab Results   Component Value Date/Time     2020 05:10 PM    K 4.7 2020 05:10 PM     2020 05:10 PM    CO2 30 2020 05:10 PM    AGAP 6 2020 05:10 PM     (H) 2020 05:10 PM    BUN 25 (H) 2020 05:10 PM    CREA 1.47 (H) 2020 05:10 PM    GFRAA 43 (L) 2020 05:10 PM    GFRNA 35 (L) 2020 05:10 PM     CMP:   Lab Results   Component Value Date/Time     2020 05:10 PM    K 4.7 2020 05:10 PM     09/09/2020 05:10 PM    CO2 30 09/09/2020 05:10 PM    AGAP 6 09/09/2020 05:10 PM     (H) 09/09/2020 05:10 PM    BUN 25 (H) 09/09/2020 05:10 PM    CREA 1.47 (H) 09/09/2020 05:10 PM    GFRAA 43 (L) 09/09/2020 05:10 PM    GFRNA 35 (L) 09/09/2020 05:10 PM    CA 9.2 09/09/2020 05:10 PM    MG 2.0 09/09/2020 05:10 PM    ALB 3.3 (L) 09/09/2020 05:10 PM    TP 6.6 09/09/2020 05:10 PM    GLOB 3.3 09/09/2020 05:10 PM    AGRAT 1.0 09/09/2020 05:10 PM    ALT 29 09/09/2020 05:10 PM     CBC:   Lab Results   Component Value Date/Time    WBC 2.2 (L) 09/09/2020 05:30 PM    HGB 10.8 (L) 09/09/2020 05:30 PM    HCT 32.9 (L) 09/09/2020 05:30 PM    PLT 41 (L) 09/09/2020 05:30 PM     All Cardiac Markers in the last 24 hours:   Lab Results   Component Value Date/Time     (H) 09/09/2020 05:10 PM    CKMB 7.4 (H) 09/09/2020 05:10 PM    CKND1 1.9 09/09/2020 05:10 PM    TROIQ <0.02 09/09/2020 05:10 PM     Recent Glucose Results:   Lab Results   Component Value Date/Time     (H) 09/09/2020 05:10 PM     ABG:   Lab Results   Component Value Date/Time    PHI 7.34 (L) 09/09/2020 05:40 PM    PCO2I 51.0 (H) 09/09/2020 05:40 PM    PO2I 60 (L) 09/09/2020 05:40 PM    HCO3I 27.2 (H) 09/09/2020 05:40 PM     COAGS: No results found for: APTT, PTP, INR, INREXT, INREXT  Liver Panel:   Lab Results   Component Value Date/Time    ALB 3.3 (L) 09/09/2020 05:10 PM    TP 6.6 09/09/2020 05:10 PM    GLOB 3.3 09/09/2020 05:10 PM    AGRAT 1.0 09/09/2020 05:10 PM    ALT 29 09/09/2020 05:10 PM     (H) 09/09/2020 05:10 PM     Pancreatic Markers: No results found for: AMYLPOCT, AML, LIPPOCT, LPSE    Ct Head Wo Cont    Result Date: 9/9/2020  EXAM: CT HEAD WO CONT CLINICAL INDICATION/HISTORY: ams COMPARISON: None. TECHNIQUE: Axial CT imaging of the head was performed without intravenous contrast. Sagittal and coronal reconstructions are provided.  One or more dose reduction techniques were used on this CT: automated exposure control, adjustment of the mAs and/or kVp according to patient size, and iterative reconstruction techniques. The specific techniques used on this CT exam have been documented in the patient's electronic medical record. Digital Imaging and Communications in Medicine (DICOM) format image data are available to nonaffiliated external healthcare facilities or entities on a secure, media free, reciprocally searchable basis with patient authorization for at least a 12-month period after this study _______________ FINDINGS: BRAIN AND POSTERIOR FOSSA: The sulci, folia, ventricles and basal cisterns are within normal limits for the patient's age. There is no intracranial hemorrhage, mass effect, or midline shift. There are scattered and confluent foci of decreased attenuation in the periventricular white matter which are nonspecific but most likely sequelae of chronic microvascular disease. EXTRA-AXIAL SPACES AND MENINGES: There are no abnormal extra-axial fluid collections. CALVARIUM: Intact. SINUSES: Clear. OTHER: Prior bilateral lens replacements. _______________     IMPRESSION: No acute intracranial abnormalities. Xr Ribs Rt W Pa Cxr Min 3 V    Result Date: 8/23/2020  EXAM: Frontal view of the chest and 3 views of the right ribs CLINICAL INDICATION/HISTORY: Right rib pain COMPARISON: Chest radiograph dated 7/20/2011 _______________ FINDINGS: Linear opacity identified at the right lung base. No pleural effusion or pneumothorax identified. Questionable subtle cortical irregularity involving the lateral eighth and possibly ninth ribs. No other displaced rib fractures identified. _______________     IMPRESSION: 1. Equivocal findings of nondisplaced lateral right eighth and ninth rib fractures. No other rib fractures identified. 2. Linear atelectasis at the right lung base. No pleural effusion or pneumothorax identified.     Procedures/imaging: see electronic medical records for all procedures/Xrays and details which were not copied into this note but were reviewed prior to creation of Naren Duong MD, Internal Medicine     CC: Other, MD Boo

## 2020-09-10 NOTE — ROUTINE PROCESS
TRANSFER - OUT REPORT:    Verbal report given to Reza Higgins RN(name) on Low Quijano  being transferred to ICU(unit) for routine progression of care       Report consisted of patients Situation, Background, Assessment and   Recommendations(SBAR). Information from the following report(s) SBAR, ED Summary, Procedure Summary, MAR, Recent Results and Cardiac Rhythm NSR to Sinus Tach was reviewed with the receiving nurse. Lines:   Peripheral IV 09/09/20 Left Antecubital (Active)       Peripheral IV 09/09/20 Left Arm (Active)   Site Assessment Clean, dry, & intact 09/09/20 1803   Phlebitis Assessment 0 09/09/20 1803   Infiltration Assessment 0 09/09/20 1803   Dressing Status Clean, dry, & intact 09/09/20 1803   Dressing Type Transparent;Tape 09/09/20 1803   Hub Color/Line Status Pink 09/09/20 1803   Action Taken Blood drawn 09/09/20 1803   Alcohol Cap Used Yes 09/09/20 1803        Opportunity for questions and clarification was provided.       Patient transported with:   Monitor  O2 @ 2L NC liters  Registered Nurse

## 2020-09-10 NOTE — PROGRESS NOTES
ADMISSION MEDICATION RECONCILIATION      Medication Reconciliation has been performed by pharmacist on this patient admitted through the ED yesterday. Juan Groves is a 72 y.o. female with the following Problems:  Assessment This Admission:   Chief complaint:   Chief Complaint   Patient presents with    Drug Overdose      Principal Problem:    Methadone overdose ([address] (9/9/2020)    Active Problems:    Hypercarbia (9/9/2020)      Hypoxia (9/9/2020)      LIONEL (acute kidney injury) ([address] (9/9/2020)      Prolonged Q-T interval on ECG (9/9/2020)      Bradycardia (9/9/2020)      Encephalopathy acute (9/9/2020)      Thrombocytopenia ([address] (9/10/2020)         Allergies as of 09/09/2020 - Review Complete 09/09/2020   Allergen Reaction Noted    Erythromycin Hives 12/08/2015    Penicillins Hives 12/08/2015    Tetracycline Hives 12/08/2015       Prior to Admission Medications   Prescriptions Last Dose Informant Patient Reported? Taking?   escitalopram oxalate (LEXAPRO) 20 mg tablet   Yes Yes   Sig: Take 10 mg by mouth daily. hydrOXYzine (VISTARIL) 25 mg capsule   Yes No   Sig: Take 50 mg by mouth two (2) times daily as needed for Itching or Anxiety. levothyroxine (SYNTHROID) 50 mcg tablet   Yes No   Sig: Take 50 mcg by mouth Daily (before breakfast). lurasidone (Latuda) 40 mg tab tablet   Yes Yes   Sig: Take 40 mg by mouth daily (with dinner). methadone (DOLOPHINE) 10 mg/mL solution   Yes Yes   Sig: Take 65 mg by mouth daily. prazosin (MINIPRESS) 2 mg capsule   Yes Yes   Sig: Take 2 mg by mouth nightly. pregabalin (Lyrica) 75 mg capsule   Yes Yes   Sig: Take 75 mg by mouth two (2) times a day. rOPINIRole (REQUIP) 0.25 mg tablet   Yes No   Sig: Take 2 mg by mouth nightly as needed. Indications: restless legs syndrome, an extreme discomfort in the calf muscles when sitting or lying down   rifAXIMin (XIFAXAN) 550 mg tablet   Yes Yes   Sig: Take 550 mg by mouth two (2) times a day.    spironolactone (ALDACTONE) 25 mg tablet   Yes No   Sig: Take  by mouth daily. traZODone (DESYREL) 100 mg tablet   Yes No   Sig: Take 200 mg by mouth nightly. vit A/vit C/vit E/zinc/copper (PRESERVISION AREDS PO)   Yes Yes   Sig: Take 1 Cap by mouth two (2) times a day. zinc 50 mg tab tablet   Yes Yes   Sig: Take 50 mg by mouth daily. Facility-Administered Medications: None          Interviewed patient 's  via telephone as pt sedated on precedex gtt. This person was a [employer]. He was able to read off medication bottles as home to me and informed me of methadone clinic. Called clinic to confirm methadone dose and note date last dosed in clinic. Clinic closed but was able to reach the  through the service. Dose confirmed w/ Alix Batch as 65mg daily and pt got 2 take home doses for Sunday and the holiday Monday. Pt was expected back in clinic on Tues but did not return until Wednesay to dose and turned in her empty take home bottles at that time.  To Ferrara looks forward to coordinating care of opioid addiction with [facility], please call her at [phone] (her cell # as clinic is closed for the day). Medications are managed by: self  Patient's outpatient pharmacy is [facility] and [facility] locally     PAML was not  marked complete and did require modification. Medication Compliance Issues and/or Medication Concerns: methadone clinic is [facility] in .  Alix Batch [phone] call to coordinate care. Added to PAML: preservision, rifaxamin, zinc, latuda, lyrica, escitalopram, methadone    Removed from [facility]: tramadol , topamax, prilosec, albuterol, tessalon, refresh gtts, restasis, robitussin, medrol, flagyl, nicorderm    Modified dose/freq of PAML entry: vistaril, requip, trazodone.     Alerted Dr. Liban Orellana via verbal that clinically significant changes have been made to PAML/PTA medication list after being reviewed  by admitting physician. Also shared methadone clinic info with SARITA Young who notified psychiatrist of methadone dose and clinic director contact info for coordination of care.      Grace Rausch [facility], Concha Bryant    Clinical Pharmacist  [phone]

## 2020-09-10 NOTE — PROGRESS NOTES
2049-Verbal report received from Thelma Salmon RN. Sbar, mar, labs,kardex, ed summary, and Pt status reviewed. Pt to be transferred to ICU upon clarification of orders. 2100-Pt received in ICU 3 at this time. Sitter to remain at bedside for suicide precaution orders. Per Pt history/report patient has had several Suicide attempts in past and suicidal ideations. Currently at this time, pt is not responding to any questioning and remains soloment. Placed on ICU monitor. Oriented to room and call bell. Unable to obtain admission database at this time due to patient's status. CHG bath completed and placed in paper gowns. Sitter at bedside for safety. 2200-IV to Left F/A painful to flush. New 22 G Iv placed in Rt F/A at this time. HR decreasing below 45. Narcan drip initiated per orders. 0000-Assessment completed. Pt is more alert at this time. Drowsy but responds to voice. 0400-Assessment completed. Pt is alert and oriented at this time. Questions answered about situation, as patient does not remember drinking Methadone or situation coming to hospital.    0735-Bedside verbal report given to Adrian Encinas RN. Muraliar, mar, labs, kardex, and patient status reviewed. Relinquished care of patient.

## 2020-09-10 NOTE — PROGRESS NOTES
9/10/2020 PT note: consult received and chart reviewed. Spoke with nurse Lexii Campbell who advises to hold PT today and f/up tomorrow to check for pt appropriateness. Will f/u as appropriate.  Thank you for this referral.   Patti Rosales, PT

## 2020-09-10 NOTE — PROGRESS NOTES
Hospitalist Progress Note-critical care note     Patient: Cedrick Cerda MRN: [id number]  CSN: [id number]    YOB: 1950  Age: 72 y.o. Sex: female    DOA: 9/9/2020 LOS:  LOS: 1 day            Chief complaint: Methadone overuse hypoxia, prolonged QT bradycardia encephalopathy    Assessment/Plan         Hospital Problems  Never Reviewed          Codes Class Noted POA    Thrombocytopenia ([facility] ICD-10-CM: D69.6  ICD-9-CM: 287.5  9/10/2020 Unknown        Hypercarbia ICD-10-CM: R06.89  ICD-9-CM: 786.09  9/9/2020 Yes        Hypoxia ICD-10-CM: R09.02  ICD-9-CM: 799.02  9/9/2020 Yes        * (Principal) Methadone overdose ([facility] ICD-10-CM: T40.3X1A  ICD-9-CM: 965.02, E980.0  9/9/2020 Yes        LIONEL (acute kidney injury) ([facility] ICD-10-CM: N17.9  ICD-9-CM: 584.9  9/9/2020 Yes        Prolonged Q-T interval on ECG ICD-10-CM: R94.31  ICD-9-CM: 794.31  9/9/2020 Yes        Bradycardia ICD-10-CM: R00.1  ICD-9-CM: 427.89  9/9/2020 Yes        Encephalopathy acute ICD-10-CM: G93.40  ICD-9-CM: 348.30  9/9/2020 Yes                Hypercarbia and hypoxia   Due to drug overdose  Received narcan last night   More alert now           Methadone overdose   Cardiac monitoring ,   Optimize electrolytes   Narcan last night and now on precedex   Sitter, si precaution      Prolong Q-T and bradycardia -like due side effect from methadone   cardiac monitoring   Repeat ekg today   Optimize electrolytes      lionel   Resolved      Acute encephalopathy   Ct head no acute issue,  More alert and oriented     DM type II   Ssi      Anemia and thrombocytopenia   Stable    Hx of cirrhosis   Ammonia level was wnl     Will have speech evaluation and psych evaluation.      Subjective : I took methadone in her freezer, I took it. 65 mg, I want to brush my teeth     RN: more alert   Disposition :tbd,   Review of systems:    General: No fevers or chills. Tired   Cardiovascular: No chest pain or pressure. No palpitations.    Pulmonary: No What Type Of Note Output Would You Prefer (Optional)?: Bullet Format How Severe Is Your Rash?: mild shortness of breath. Gastrointestinal: No nausea, vomiting. Vital signs/Intake and Output:  Visit Vitals  /61   Pulse (!) 50   Temp 98.3 °F (36.8 °C)   Resp 16   Ht 5' 2\" (1.575 m)   Wt 84.5 kg (186 lb 4.6 oz)   SpO2 96%   BMI 34.07 kg/m²     Current Shift:  09/10 0701 - 09/10 1900  In: -   Out: 250 [Urine:250]  Last three shifts:  09/08 1901 - 09/10 0700  In: 1300.3 [I.V.:1300.3]  Out: -     Physical Exam:  General: WD, WN. Alert, cooperative, no acute distress    HEENT: NC, Atraumatic. PERRLA, anicteric sclerae. Lungs: CTA Bilaterally. No Wheezing/Rhonchi/Rales. Heart:  Hari ,  No murmur, No Rubs, No Gallops  Abdomen: Soft, Non distended, Non tender. +Bowel sounds,   Extremities: No c/c/e  Psych:   Not anxious or agitated. Neurologic:  No acute neurological deficit. Restless              Labs: Results:       Chemistry Recent Labs     09/10/20  0455 09/09/20  1710   GLU 86 154*    137   K 4.3 4.7    101   CO2 27 30   BUN 24* 25*   CREA 0.92 1.47*   CA 8.3* 9.2   AGAP 3 6   BUCR 26* 17    128*   TP 5.8* 6.6   ALB 2.9* 3.3*   GLOB 2.9 3.3   AGRAT 1.0 1.0      CBC w/Diff Recent Labs     09/10/20  0455 09/09/20  1730   WBC 2.3* 2.2*   RBC 3.93* 3.99*   HGB 10.5* 10.8*   HCT 32.7* 32.9*   PLT 43* 41*   GRANS 71 80*   LYMPH 20* 13*   EOS 0 0      Cardiac Enzymes Recent Labs     09/09/20  1710   *   CKND1 1.9      Coagulation No results for input(s): PTP, INR, APTT, INREXT in the last 72 hours. Lipid Panel No results found for: CHOL, CHOLPOCT, CHOLX, CHLST, CHOLV, 379611, HDL, HDLP, LDL, LDLC, DLDLP, 914844, VLDLC, VLDL, TGLX, TRIGL, TRIGP, TGLPOCT, CHHD, CHHDX   BNP No results for input(s): BNPP in the last 72 hours.    Liver Enzymes Recent Labs     09/10/20  0455   TP 5.8*   ALB 2.9*         Thyroid Studies Lab Results   Component Value Date/Time    TSH 1.79 02/12/2011 03:58 AM        Procedures/imaging: see electronic medical records for all procedures/Xrays and Is This A New Presentation, Or A Follow-Up?: Rash details which were not copied into this note but were reviewed prior to creation of Plan    Ct Head Wo Cont    Result Date: 9/9/2020  EXAM: CT HEAD WO CONT CLINICAL INDICATION/HISTORY: ams COMPARISON: None. TECHNIQUE: Axial CT imaging of the head was performed without intravenous contrast. Sagittal and coronal reconstructions are provided. One or more dose reduction techniques were used on this CT: automated exposure control, adjustment of the mAs and/or kVp according to patient size, and iterative reconstruction techniques. The specific techniques used on this CT exam have been documented in the patient's electronic medical record. Digital Imaging and Communications in Medicine (DICOM) format image data are available to nonaffiliated external healthcare facilities or entities on a secure, media free, reciprocally searchable basis with patient authorization for at least a 12-month period after this study _______________ FINDINGS: BRAIN AND POSTERIOR FOSSA: The sulci, folia, ventricles and basal cisterns are within normal limits for the patient's age. There is no intracranial hemorrhage, mass effect, or midline shift. There are scattered and confluent foci of decreased attenuation in the periventricular white matter which are nonspecific but most likely sequelae of chronic microvascular disease. EXTRA-AXIAL SPACES AND MENINGES: There are no abnormal extra-axial fluid collections. CALVARIUM: Intact. SINUSES: Clear. OTHER: Prior bilateral lens replacements. _______________     IMPRESSION: No acute intracranial abnormalities. Xr Chest Port    Result Date: 9/9/2020  EXAM: XR CHEST PORT CLINICAL INDICATION/HISTORY: OD   > Additional: Altered mental status COMPARISON: Correlation with rib series dated August 23, 2020 TECHNIQUE: Portable chest FINDINGS: SUPPORT DEVICES: None. HEART AND MEDIASTINUM: Normal size and contour. Normal pulmonary vasculature.  LUNGS AND PLEURAL SPACES: Thin band of subsegmental atelectasis or scarring at the right lung base, unchanged. The lungs are otherwise well expanded and clear. No focal consolidation, effusion, or pneumothorax. BONY THORAX AND SOFT TISSUES: No acute osseous abnormality. IMPRESSION: No active cardiopulmonary disease. Xr Ribs Rt W Pa Cxr Min 3 V    Result Date: 8/23/2020  EXAM: Frontal view of the chest and 3 views of the right ribs CLINICAL INDICATION/HISTORY: Right rib pain COMPARISON: Chest radiograph dated 7/20/2011  FINDINGS: Linear opacity identified at the right lung base. No pleural effusion or pneumothorax identified. Questionable subtle cortical irregularity involving the lateral eighth and possibly ninth ribs. No other displaced rib fractures identified. IMPRESSION: 1. Equivocal findings of nondisplaced lateral right eighth and ninth rib fractures. No other rib fractures identified. 2. Linear atelectasis at the right lung base. No pleural effusion or pneumothorax identified.       Burgess Blanca MD

## 2020-09-10 NOTE — DIABETES MGMT
GLYCEMIC CONTROL PROGRESS NOTE:    - chart reviewed, known h/o DM2, HbA1C ordered per protocol  - Humalog Normal Insulin Sensitivity Corrective Coverage ordered per protocol    Recent Glucose Results:   Lab Results   Component Value Date/Time    GLU 86 09/10/2020 04:55 AM     (H) 09/09/2020 05:10 PM    GLUCPOC 94 09/10/2020 06:01 AM    GLUCPOC 97 09/10/2020 01:45 AM     Naina Wright MS, RN, CDE  Glycemic Control Team  [phone]  Pager [phone] (M-TH 8:00-4:30P)  *After Hours pager [phone]

## 2020-09-10 NOTE — PROGRESS NOTES
Attempted to see Ms. Shah Seen for psych consult, patient admitted s/p suspected OD on Methadone, not sure if it was prescribed to her or how long she has been taking it. Patient is sedated, unable to provide any information and not able to participate in Psychiatric eval at this time to determine if she needs inpatient Psych admission. Discussed with RN. After she is medically more stable and communicating well, please call Psychiatrist again.     Thanks for the consult    Arnie Bernal MD  Psychiatrist

## 2020-09-10 NOTE — PROGRESS NOTES
Reason for Admission:   Possible methadone overdose                   RUR Score:  5                   Plan for utilizing home health:     TBD     PCP: [facility] and [facility]   Name of Practice:    Are you a current patient: Yes/No:    Approximate date of last visit:    Can you participate in a virtual visit with your PCP:                     Current Advanced Directive/Advance Care Plan: not on chart at this time                         Transition of Care Plan:    Chart reviewed per ED note pt arrived via EMS from home due to unresponsiveness and possible methadone overdose and admitted to ICU unit,cm unable to speak with pt due to AMS, cm did reach out to spouse listed on facesheet, cm introduced self and explained cm role as d/c planner, prior to admission souse stated pt independent with personal care does have rolling walker and walking cane that she uses when needed, stated pt does go to [address] and psych physician but spouse does not recall name, [facility]one [facility]  [facility] per spouse, also stated pt goes to methadone clinic M-F and on Fridays clinic gives pt weekend methadone to take home, spouse aware cm will cont to follow case to assist with d/c planning, cm did provide spouse with room telephone # to speak with spouse once pt become more alert.   Care Management Interventions  Palliative Care Criteria Met (RRAT>21 & CHF Dx)?: No  Current Support Network: Lives with Spouse

## 2020-09-11 PROBLEM — J96.21 ACUTE ON CHRONIC RESPIRATORY FAILURE WITH HYPOXIA AND HYPERCAPNIA (HCC): Status: ACTIVE | Noted: 2020-09-11

## 2020-09-11 PROBLEM — R93.1 ABNORMAL ECHOCARDIOGRAM: Status: ACTIVE | Noted: 2020-09-11

## 2020-09-11 PROBLEM — J96.22 ACUTE ON CHRONIC RESPIRATORY FAILURE WITH HYPOXIA AND HYPERCAPNIA (HCC): Status: ACTIVE | Noted: 2020-09-11

## 2020-09-11 LAB
ALBUMIN SERPL-MCNC: 3.3 G/DL (ref 3.4–5)
ALBUMIN/GLOB SERPL: 1 {RATIO} (ref 0.8–1.7)
ALP SERPL-CCNC: 111 U/L (ref 45–117)
ALT SERPL-CCNC: 48 U/L (ref 13–56)
ANION GAP SERPL CALC-SCNC: 4 MMOL/L (ref 3–18)
ARTERIAL PATENCY WRIST A: YES
AST SERPL-CCNC: 175 U/L (ref 10–38)
ATRIAL RATE: 71 BPM
AVAPS, IAVAPS: YES
BASE DEFICIT BLD-SCNC: 3 MMOL/L
BASE EXCESS BLD CALC-SCNC: 0 MMOL/L
BASE EXCESS BLD CALC-SCNC: 0 MMOL/L
BASOPHILS # BLD: 0 K/UL (ref 0–0.1)
BASOPHILS NFR BLD: 0 % (ref 0–2)
BDY SITE: ABNORMAL
BILIRUB SERPL-MCNC: 0.8 MG/DL (ref 0.2–1)
BODY TEMPERATURE: 98.6
BUN SERPL-MCNC: 23 MG/DL (ref 7–18)
BUN/CREAT SERPL: 26 (ref 12–20)
CA-I SERPL-SCNC: 1.03 MMOL/L (ref 1.12–1.32)
CALCIUM SERPL-MCNC: 8.8 MG/DL (ref 8.5–10.1)
CALCULATED P AXIS, ECG09: 40 DEGREES
CALCULATED R AXIS, ECG10: 1 DEGREES
CALCULATED T AXIS, ECG11: 34 DEGREES
CHLORIDE SERPL-SCNC: 110 MMOL/L (ref 100–111)
CO2 SERPL-SCNC: 28 MMOL/L (ref 21–32)
CREAT SERPL-MCNC: 0.87 MG/DL (ref 0.6–1.3)
DIAGNOSIS, 93000: NORMAL
DIFFERENTIAL METHOD BLD: ABNORMAL
EOSINOPHIL # BLD: 0 K/UL (ref 0–0.4)
EOSINOPHIL NFR BLD: 1 % (ref 0–5)
ERYTHROCYTE [DISTWIDTH] IN BLOOD BY AUTOMATED COUNT: 15.9 % (ref 11.6–14.5)
GAS FLOW.O2 O2 DELIVERY SYS: ABNORMAL L/MIN
GAS FLOW.O2 SETTING OXYMISER: 3 L/M
GAS FLOW.O2 SETTING OXYMISER: 5 L/M
GLOBULIN SER CALC-MCNC: 3.4 G/DL (ref 2–4)
GLUCOSE BLD STRIP.AUTO-MCNC: 122 MG/DL (ref 70–110)
GLUCOSE BLD STRIP.AUTO-MCNC: 130 MG/DL (ref 70–110)
GLUCOSE BLD STRIP.AUTO-MCNC: 76 MG/DL (ref 70–110)
GLUCOSE BLD STRIP.AUTO-MCNC: 79 MG/DL (ref 70–110)
GLUCOSE SERPL-MCNC: 65 MG/DL (ref 74–99)
HCO3 BLD-SCNC: 23.7 MMOL/L (ref 22–26)
HCO3 BLD-SCNC: 26.9 MMOL/L (ref 22–26)
HCO3 BLD-SCNC: 27.7 MMOL/L (ref 22–26)
HCT VFR BLD AUTO: 37.5 % (ref 35–45)
HGB BLD-MCNC: 11.9 G/DL (ref 12–16)
LYMPHOCYTES # BLD: 1 K/UL (ref 0.9–3.6)
LYMPHOCYTES NFR BLD: 25 % (ref 21–52)
MAGNESIUM SERPL-MCNC: 2.2 MG/DL (ref 1.6–2.6)
MCH RBC QN AUTO: 27.3 PG (ref 24–34)
MCHC RBC AUTO-ENTMCNC: 31.7 G/DL (ref 31–37)
MCV RBC AUTO: 86 FL (ref 74–97)
MONOCYTES # BLD: 0.6 K/UL (ref 0.05–1.2)
MONOCYTES NFR BLD: 14 % (ref 3–10)
NEUTS SEG # BLD: 2.4 K/UL (ref 1.8–8)
NEUTS SEG NFR BLD: 60 % (ref 40–73)
O2/TOTAL GAS SETTING VFR VENT: 0.65 %
O2/TOTAL GAS SETTING VFR VENT: 32 %
O2/TOTAL GAS SETTING VFR VENT: 40 %
P-R INTERVAL, ECG05: 162 MS
PCO2 BLD: 52.2 MMHG (ref 35–45)
PCO2 BLD: 57.4 MMHG (ref 35–45)
PCO2 BLD: 69.7 MMHG (ref 35–45)
PEEP RESPIRATORY: 72 CMH2O
PH BLD: 7.21 [PH] (ref 7.35–7.45)
PH BLD: 7.27 [PH] (ref 7.35–7.45)
PH BLD: 7.28 [PH] (ref 7.35–7.45)
PHOSPHATE SERPL-MCNC: 3 MG/DL (ref 2.5–4.9)
PIP ISTAT,IPIP: 22
PLATELET # BLD AUTO: 50 K/UL (ref 135–420)
PMV BLD AUTO: 10.6 FL (ref 9.2–11.8)
PO2 BLD: 212 MMHG (ref 80–100)
PO2 BLD: 60 MMHG (ref 80–100)
PO2 BLD: 62 MMHG (ref 80–100)
POTASSIUM SERPL-SCNC: 4.7 MMOL/L (ref 3.5–5.5)
PROT SERPL-MCNC: 6.7 G/DL (ref 6.4–8.2)
Q-T INTERVAL, ECG07: 446 MS
QRS DURATION, ECG06: 90 MS
QTC CALCULATION (BEZET), ECG08: 484 MS
RBC # BLD AUTO: 4.36 M/UL (ref 4.2–5.3)
SAO2 % BLD: 100 % (ref 92–97)
SAO2 % BLD: 84 % (ref 92–97)
SAO2 % BLD: 87 % (ref 92–97)
SERVICE CMNT-IMP: ABNORMAL
SODIUM SERPL-SCNC: 142 MMOL/L (ref 136–145)
SPECIMEN TYPE: ABNORMAL
TOTAL RESP. RATE, ITRR: 1
VENTRICULAR RATE, ECG03: 71 BPM
WBC # BLD AUTO: 4.1 K/UL (ref 4.6–13.2)

## 2020-09-11 PROCEDURE — 94660 CPAP INITIATION&MGMT: CPT

## 2020-09-11 PROCEDURE — 82330 ASSAY OF CALCIUM: CPT

## 2020-09-11 PROCEDURE — 82803 BLOOD GASES ANY COMBINATION: CPT

## 2020-09-11 PROCEDURE — 84100 ASSAY OF PHOSPHORUS: CPT

## 2020-09-11 PROCEDURE — 74011000258 HC RX REV CODE- 258: Performed by: INTERNAL MEDICINE

## 2020-09-11 PROCEDURE — 36600 WITHDRAWAL OF ARTERIAL BLOOD: CPT

## 2020-09-11 PROCEDURE — 74011250636 HC RX REV CODE- 250/636: Performed by: HOSPITALIST

## 2020-09-11 PROCEDURE — 77010033678 HC OXYGEN DAILY

## 2020-09-11 PROCEDURE — C9113 INJ PANTOPRAZOLE SODIUM, VIA: HCPCS | Performed by: HOSPITALIST

## 2020-09-11 PROCEDURE — 74011000250 HC RX REV CODE- 250: Performed by: HOSPITALIST

## 2020-09-11 PROCEDURE — 85025 COMPLETE CBC W/AUTO DIFF WBC: CPT

## 2020-09-11 PROCEDURE — 87040 BLOOD CULTURE FOR BACTERIA: CPT

## 2020-09-11 PROCEDURE — 82962 GLUCOSE BLOOD TEST: CPT

## 2020-09-11 PROCEDURE — 65610000006 HC RM INTENSIVE CARE

## 2020-09-11 PROCEDURE — 94640 AIRWAY INHALATION TREATMENT: CPT

## 2020-09-11 PROCEDURE — 51798 US URINE CAPACITY MEASURE: CPT

## 2020-09-11 PROCEDURE — 74011250637 HC RX REV CODE- 250/637: Performed by: INTERNAL MEDICINE

## 2020-09-11 PROCEDURE — 74011250637 HC RX REV CODE- 250/637: Performed by: HOSPITALIST

## 2020-09-11 PROCEDURE — 83735 ASSAY OF MAGNESIUM: CPT

## 2020-09-11 PROCEDURE — 80053 COMPREHEN METABOLIC PANEL: CPT

## 2020-09-11 PROCEDURE — 93005 ELECTROCARDIOGRAM TRACING: CPT

## 2020-09-11 PROCEDURE — 36415 COLL VENOUS BLD VENIPUNCTURE: CPT

## 2020-09-11 PROCEDURE — 74011250636 HC RX REV CODE- 250/636: Performed by: INTERNAL MEDICINE

## 2020-09-11 PROCEDURE — 74011000250 HC RX REV CODE- 250: Performed by: INTERNAL MEDICINE

## 2020-09-11 PROCEDURE — 77030019905 HC CATH URETH INTMIT MDII -A

## 2020-09-11 RX ORDER — CLINDAMYCIN PHOSPHATE 600 MG/50ML
600 INJECTION, SOLUTION INTRAVENOUS EVERY 8 HOURS
Status: DISCONTINUED | OUTPATIENT
Start: 2020-09-11 | End: 2020-09-15

## 2020-09-11 RX ORDER — CALCIUM GLUCONATE 20 MG/ML
2 INJECTION, SOLUTION INTRAVENOUS ONCE
Status: COMPLETED | OUTPATIENT
Start: 2020-09-11 | End: 2020-09-11

## 2020-09-11 RX ORDER — DEXTROSE MONOHYDRATE AND SODIUM CHLORIDE 5; .9 G/100ML; G/100ML
25 INJECTION, SOLUTION INTRAVENOUS CONTINUOUS
Status: DISCONTINUED | OUTPATIENT
Start: 2020-09-11 | End: 2020-09-16

## 2020-09-11 RX ORDER — LORAZEPAM 2 MG/ML
1 INJECTION INTRAMUSCULAR ONCE
Status: COMPLETED | OUTPATIENT
Start: 2020-09-11 | End: 2020-09-11

## 2020-09-11 RX ORDER — NALOXONE HYDROCHLORIDE 0.4 MG/ML
0.4 INJECTION, SOLUTION INTRAMUSCULAR; INTRAVENOUS; SUBCUTANEOUS ONCE
Status: COMPLETED | OUTPATIENT
Start: 2020-09-11 | End: 2020-09-11

## 2020-09-11 RX ADMIN — DEXTROSE MONOHYDRATE AND SODIUM CHLORIDE 25 ML/HR: 5; .9 INJECTION, SOLUTION INTRAVENOUS at 11:58

## 2020-09-11 RX ADMIN — LORAZEPAM 1 MG: 2 INJECTION, SOLUTION INTRAMUSCULAR; INTRAVENOUS at 15:25

## 2020-09-11 RX ADMIN — CALCIUM GLUCONATE 2 G: 20 INJECTION, SOLUTION INTRAVENOUS at 12:39

## 2020-09-11 RX ADMIN — ARFORMOTEROL TARTRATE 15 MCG: 15 SOLUTION RESPIRATORY (INHALATION) at 07:37

## 2020-09-11 RX ADMIN — LORAZEPAM 1 MG: 2 INJECTION INTRAMUSCULAR; INTRAVENOUS at 15:10

## 2020-09-11 RX ADMIN — ARFORMOTEROL TARTRATE 15 MCG: 15 SOLUTION RESPIRATORY (INHALATION) at 19:52

## 2020-09-11 RX ADMIN — CLINDAMYCIN PHOSPHATE 600 MG: 600 INJECTION, SOLUTION INTRAVENOUS at 11:58

## 2020-09-11 RX ADMIN — BUDESONIDE 500 MCG: 0.5 INHALANT RESPIRATORY (INHALATION) at 07:37

## 2020-09-11 RX ADMIN — LORAZEPAM 1 MG: 2 INJECTION INTRAMUSCULAR; INTRAVENOUS at 23:55

## 2020-09-11 RX ADMIN — IPRATROPIUM BROMIDE AND ALBUTEROL SULFATE 3 ML: .5; 3 SOLUTION RESPIRATORY (INHALATION) at 19:52

## 2020-09-11 RX ADMIN — SODIUM CHLORIDE 75 ML/HR: 900 INJECTION, SOLUTION INTRAVENOUS at 02:50

## 2020-09-11 RX ADMIN — DEXMEDETOMIDINE HYDROCHLORIDE 0.2 MCG/KG/HR: 100 INJECTION, SOLUTION INTRAVENOUS at 23:43

## 2020-09-11 RX ADMIN — SODIUM CHLORIDE 40 MG: 9 INJECTION, SOLUTION INTRAMUSCULAR; INTRAVENOUS; SUBCUTANEOUS at 21:22

## 2020-09-11 RX ADMIN — Medication 10 ML: at 21:23

## 2020-09-11 RX ADMIN — DEXMEDETOMIDINE HYDROCHLORIDE 0.7 MCG/KG/HR: 100 INJECTION, SOLUTION INTRAVENOUS at 15:23

## 2020-09-11 RX ADMIN — BUDESONIDE 500 MCG: 0.5 INHALANT RESPIRATORY (INHALATION) at 19:52

## 2020-09-11 RX ADMIN — IPRATROPIUM BROMIDE AND ALBUTEROL SULFATE 3 ML: .5; 3 SOLUTION RESPIRATORY (INHALATION) at 07:37

## 2020-09-11 RX ADMIN — IPRATROPIUM BROMIDE AND ALBUTEROL SULFATE 3 ML: .5; 3 SOLUTION RESPIRATORY (INHALATION) at 13:47

## 2020-09-11 RX ADMIN — CLINDAMYCIN PHOSPHATE 600 MG: 600 INJECTION, SOLUTION INTRAVENOUS at 21:22

## 2020-09-11 RX ADMIN — NALXONE HYDROCHLORIDE 0.4 MG: 0.4 INJECTION INTRAMUSCULAR; INTRAVENOUS; SUBCUTANEOUS at 15:05

## 2020-09-11 NOTE — PROGRESS NOTES
PT orders received and chart reviewed. Pt still on bipap, was unable to wean this AM. Not appropriate for OOB mobility at this time.  Will follow up tomorrow to assess appropriateness for participation with PT.

## 2020-09-11 NOTE — PROGRESS NOTES
Cardiology Progress Note        Patient: Mary Parrish        Sex: female          DOA: 9/9/2020  YOB: 1950      Age:  72 y.o.        LOS:  LOS: 2 days   Assessment/Plan     Principal Problem:    Methadone overdose ([address] (9/9/2020)    Active Problems:    Hypercarbia (9/9/2020)      Hypoxia (9/9/2020)      LIONEL (acute kidney injury) ([address] (9/9/2020)      Prolonged Q-T interval on ECG (9/9/2020)      Bradycardia (9/9/2020)      Encephalopathy acute (9/9/2020)      Thrombocytopenia ([address] (9/10/2020)      Abnormal echocardiogram (9/11/2020)      Acute on chronic respiratory failure with hypoxia and hypercapnia ([address] (9/11/2020)        Plan: Altered mental status   Abnormal echocardiogram  Bradycardia    Heart rate is better today     There is a small thickening /degenerative changes on tip of anterior leaflet of the tricuspid valve on ventricular side, without independent movement. I cannot exclude possible small old calcified vegetation. No previous echocardiogram done at [facility] on 10/11/2019 images available to compare. CD received from [facility] today at 5:30 p.m. and unable to open the study on multiple computers to compare,  Tried on multiple computer. Will request [facility] to to send study via epic if possible recent working CD.  case was discussed with Dr. Disha Cummins yesterday, according to her there is no sign or symptoms of clinical infection. Treatment as per hospitalist.          [phone]  Addendum:  Please note that I have reviewed CD with [facility] Radiology [facility], there is no data on this CD from [facility]. will request again for for new CD with data from [facility]. THX      Subjective:    cc:   Altered mental status   Abnormal echocardiogram  Bradycardia        REVIEW OF SYSTEMS:      limited due to altered mental status      Objective:      Visit Vitals  BP (!) 153/49   Pulse 62   Temp 98.6 °F (37 °C) Resp 11   Ht 5' 2\" (1.575 m)   Wt 84.4 kg (186 lb)   SpO2 (!) 89%   BMI 34.02 kg/m²     Body mass index is 34.02 kg/m². Physical Exam:  General Appearance:  sleeping  NECK: No JVD, no thyroidomeglay. LUNGS: Clear bilaterally. HEART: S1+S2 audible,    ABD: Non-tender, BS Audible    EXT: No edema, and no cysnosis. VASCULAR EXAM: Pulses are intact.         Medication:  Current Facility-Administered Medications   Medication Dose Route Frequency    clindamycin (CLEOCIN) 600mg NS 50 mL IVPB (premix)  600 mg IntraVENous Q8H    dextrose 5% and 0.9% NaCl infusion  25 mL/hr IntraVENous CONTINUOUS    ELECTROLYTE REPLACEMENT PROTOCOL - Phosphorus  Standard Dosing  1 Each Other PRN    dexmedeTOMidine (PRECEDEX) 400 mcg in 0.9% sodium chloride 100 mL infusion  0.2-0.7 mcg/kg/hr IntraVENous TITRATE    nicotine (NICODERM CQ) 14 mg/24 hr patch 1 Patch  1 Patch TransDERmal DAILY    budesonide (PULMICORT) 500 mcg/2 ml nebulizer suspension  500 mcg Nebulization BID RT    albuterol-ipratropium (DUO-NEB) 2.5 MG-0.5 MG/3 ML  3 mL Nebulization TID RT    promethazine (PHENERGAN) 12.5 mg in 0.9% sodium chloride 50 mL IVPB  12.5 mg IntraVENous Q6H PRN    LORazepam (ATIVAN) injection 1 mg  1 mg IntraVENous Q6H PRN    arformoteroL (BROVANA) neb solution 15 mcg  15 mcg Nebulization BID RT    sodium chloride (NS) flush 5-40 mL  5-40 mL IntraVENous Q8H    sodium chloride (NS) flush 5-40 mL  5-40 mL IntraVENous PRN    acetaminophen (TYLENOL) tablet 650 mg  650 mg Oral Q6H PRN    Or    acetaminophen (TYLENOL) suppository 650 mg  650 mg Rectal Q6H PRN    bisacodyL (DULCOLAX) suppository 10 mg  10 mg Rectal DAILY PRN    promethazine (PHENERGAN) tablet 12.5 mg  12.5 mg Oral Q6H PRN    Or    ondansetron (ZOFRAN) injection 4 mg  4 mg IntraVENous Q6H PRN    naloxone (NARCAN) injection 0.4 mg  0.4 mg IntraVENous EVERY 2 MINUTES AS NEEDED    insulin lispro (HUMALOG) injection   SubCUTAneous Q6H    glucose chewable tablet 16 g  4 Tab Oral PRN    glucagon (GLUCAGEN) injection 1 mg  1 mg IntraMUSCular PRN    ELECTROLYTE REPLACEMENT PROTOCOL - Potassium Standard Dosing   1 Each Other PRN    ELECTROLYTE REPLACEMENT PROTOCOL - Magnesium   1 Each Other PRN    ELECTROLYTE REPLACEMENT PROTOCOL - Calcium   1 Each Other PRN    cloNIDine HCL (CATAPRES) tablet 0.1 mg  0.1 mg Oral Q4H PRN    pantoprazole (PROTONIX) 40 mg in 0.9% sodium chloride 10 mL injection  40 mg IntraVENous Q24H               Lab/Data Reviewed:  Procedures/imaging: see electronic medical records for all procedures/Xrays   and details which were not copied into this note but were reviewed prior to creation of Plan       All lab results for the last 24 hours reviewed. Recent Labs     09/11/20  0420 09/10/20  0455 09/09/20  1730   WBC 4.1* 2.3* 2.2*   HGB 11.9* 10.5* 10.8*   HCT 37.5 32.7* 32.9*   PLT 50* 43* 41*     Recent Labs     09/11/20  0420 09/10/20  0455 09/09/20  1710    138 137   K 4.7 4.3 4.7    108 101   CO2 28 27 30   GLU 65* 86 154*   BUN 23* 24* 25*   CREA 0.87 0.92 1.47*   CA 8.8 8.3* 9.2       RADIOLOGY:  CT Results  (Last 48 hours)               09/09/20 1905  CT HEAD WO CONT Final result    Impression:  IMPRESSION:       No acute intracranial abnormalities. Narrative:  EXAM: CT HEAD WO CONT       CLINICAL INDICATION/HISTORY: ams       COMPARISON: None. TECHNIQUE: Axial CT imaging of the head was performed without intravenous   contrast. Sagittal and coronal reconstructions are provided. One or more dose   reduction techniques were used on this CT: automated exposure control,   adjustment of the mAs and/or kVp according to patient size, and iterative   reconstruction techniques. The specific techniques used on this CT exam have   been documented in the patient's electronic medical record.  Digital Imaging and   Communications in Medicine (DICOM) format image data are available to   nonaffiliated external healthcare facilities or entities on a secure, media   free, reciprocally searchable basis with patient authorization for at least a   12-month period after this study           _______________       FINDINGS:       BRAIN AND POSTERIOR FOSSA: The sulci, folia, ventricles and basal cisterns are   within normal limits for the patient's age. There is no intracranial hemorrhage,   mass effect, or midline shift. There are scattered and confluent foci of decreased attenuation in the   periventricular white matter which are nonspecific but most likely sequelae of   chronic microvascular disease. EXTRA-AXIAL SPACES AND MENINGES: There are no abnormal extra-axial fluid   collections. CALVARIUM: Intact. SINUSES: Clear. OTHER: Prior bilateral lens replacements.       _______________               CXR Results  (Last 48 hours)               09/09/20 1813  XR CHEST PORT Final result    Impression:  IMPRESSION:       No active cardiopulmonary disease. Narrative:  EXAM: XR CHEST PORT       CLINICAL INDICATION/HISTORY: OD     > Additional: Altered mental status       COMPARISON: Correlation with rib series dated August 23, 2020       TECHNIQUE: Portable chest       _______________       FINDINGS:       SUPPORT DEVICES: None. HEART AND MEDIASTINUM: Normal size and contour. Normal pulmonary vasculature. LUNGS AND PLEURAL SPACES: Thin band of subsegmental atelectasis or scarring at   the right lung base, unchanged. The lungs are otherwise well expanded and clear. No focal consolidation, effusion, or pneumothorax. BONY THORAX AND SOFT TISSUES: No acute osseous abnormality.        _______________                   Cardiology Procedures:   Results for orders placed or performed during the hospital encounter of 09/09/20   EKG, 12 LEAD, INITIAL   Result Value Ref Range    Ventricular Rate 69 BPM    Atrial Rate 69 BPM    P-R Interval 168 ms    QRS Duration 102 ms    Q-T Interval 440 ms    QTC Calculation (Bezet) 471 ms    Calculated P Axis 45 degrees    Calculated R Axis 19 degrees    Calculated T Axis 56 degrees    Diagnosis       Normal sinus rhythm  Prolonged QT  Abnormal ECG  Confirmed by Ada Polo MD, [facility] ([id number]) on 9/10/2020 2:41:12 PM        Echo Results  (Last 48 hours)    None       Cardiolite (Tc-99m Sestamibi) stress test    Signed By: Gabby Camacho MD     September 11, 2020

## 2020-09-11 NOTE — PROGRESS NOTES
Hospitalist Progress Note-critical care note     Patient: Pratibha Disla MRN: [id number]  [facility]: [id number]    YOB: 1950  Age: 72 y.o.   Sex: female    DOA: 9/9/2020 LOS:  LOS: 2 days            Chief complaint: Methadone overuse hypoxia, prolonged QT bradycardia encephalopathy    Assessment/Plan         Hospital Problems  Never Reviewed          Codes Class Noted POA    Abnormal echocardiogram ICD-10-CM: R93.1  ICD-9-CM: 793.2  9/11/2020 Yes        Acute on chronic respiratory failure with hypoxia and hypercapnia (HCC) ICD-10-CM: J96.21, J96.22  ICD-9-CM: 518.84, 786.09, 799.02  9/11/2020 Yes        Thrombocytopenia ([facility] ICD-10-CM: D69.6  ICD-9-CM: 287.5  9/10/2020 Yes        Hypercarbia ICD-10-CM: R06.89  ICD-9-CM: 786.09  9/9/2020 Yes        Hypoxia ICD-10-CM: R09.02  ICD-9-CM: 799.02  9/9/2020 Yes        * (Principal) Methadone overdose ([facility] ICD-10-CM: T40.3X1A  ICD-9-CM: 965.02, E980.0  9/9/2020 Yes        LIONEL (acute kidney injury) ([facility] ICD-10-CM: N17.9  ICD-9-CM: 584.9  9/9/2020 Yes        Prolonged Q-T interval on ECG ICD-10-CM: R94.31  ICD-9-CM: 794.31  9/9/2020 Yes        Bradycardia ICD-10-CM: R00.1  ICD-9-CM: 427.89  9/9/2020 Yes        Encephalopathy acute ICD-10-CM: G93.40  ICD-9-CM: 348.30  9/9/2020 Yes              Acute respiratory failure with  Hypercarbia and hypoxia   Due to drug overdose  On bipap now due to worsening hypoxia and hypercapnia   Case discussed with dr. Josh Stockton          card:   Prolong Q-T and bradycardia -like due side effect from methadone and abnormal echo    Case discussed with Dr. aMrina Ram yesterday  Planning ADRIANNA for possible valve vegetation   Will have blood cx     Psych     Methadone overdose   Cardiac monitoring ,   Optimize electrolytes   Narcan as needed  Sitter, si precaution     Neuro  Acute encephalopathy  Ct head no acute issue on admission  Will have mri brain due to still confusion     Renal     lionel   Resolved    continue replace electrolytes as needed     Endo :   DM type II   Ssi      Hemo :  Anemia and thrombocytopenia   Stable    Hx of cirrhosis   Ammonia level was wnl     GI :   Npo for now , ppi     rn : still need bipap   30minutes of critical care time spent in the direct evaluation and treatment of this high risk patient. The reason for providing this level of medical care for this critically ill patient was due a critical illness that impaired one or more vital organ systems such that there was a high probability of imminent or life threatening deterioration in the patients condition. This care involved high complexity decision making to assess, manipulate, and support vital system functions, to treat this degreee vital organ system failure and to prevent further life threatening deterioration of the patients condition. Disposition :tbd,   Review of systems:  Ros limited due to on bipap     Vital signs/Intake and Output:  Visit Vitals  BP (!) 146/63   Pulse (!) 57   Temp 98.6 °F (37 °C)   Resp 16   Ht 5' 2\" (1.575 m)   Wt 84.4 kg (186 lb)   SpO2 100%   BMI 34.02 kg/m²     Current Shift:  No intake/output data recorded. Last three shifts:  09/09 1901 - 09/11 0700  In: 2097.7 [I.V.:2097.7]  Out: 365 [Urine:365]    Physical Exam:  General: WD, WN. Alert, cooperative, no acute distress    HEENT: NC, Atraumatic. PERRLA, anicteric sclerae. bipap mask noted   Lungs: CTA Bilaterally. No Wheezing/Rhonchi/Rales. Heart:  Hari ,  No murmur, No Rubs, No Gallops  Abdomen: Soft, Non distended, Non tender. +Bowel sounds,   Extremities: No c/c/e  Psych:   Not anxious or agitated. Neurologic:  No acute neurological deficit.  Confused              Labs: Results:       Chemistry Recent Labs     09/11/20  0420 09/10/20  0455 09/09/20  1710   GLU 65* 86 154*    138 137   K 4.7 4.3 4.7    108 101   CO2 28 27 30   BUN 23* 24* 25*   CREA 0.87 0.92 1.47*   CA 8.8 8.3* 9.2   AGAP 4 3 6   BUCR 26* 26* 17    111 128*   TP 6.7 5.8* 6.6   ALB 3.3* 2. 9* 3.3*   GLOB 3.4 2.9 3.3   AGRAT 1.0 1.0 1.0      CBC w/Diff Recent Labs     09/11/20  0420 09/10/20  0455 09/09/20  1730   WBC 4.1* 2.3* 2.2*   RBC 4.36 3.93* 3.99*   HGB 11.9* 10.5* 10.8*   HCT 37.5 32.7* 32.9*   PLT 50* 43* 41*   GRANS 60 71 80*   LYMPH 25 20* 13*   EOS 1 0 0      Cardiac Enzymes Recent Labs     09/09/20  1710   *   CKND1 1.9      Coagulation No results for input(s): PTP, INR, APTT, INREXT, INREXT in the last 72 hours. Lipid Panel No results found for: CHOL, CHOLPOCT, CHOLX, CHLST, CHOLV, 675993, HDL, HDLP, LDL, LDLC, DLDLP, 993803, VLDLC, VLDL, TGLX, TRIGL, TRIGP, TGLPOCT, CHHD, CHHDX   BNP No results for input(s): BNPP in the last 72 hours. Liver Enzymes Recent Labs     09/11/20  0420   TP 6.7   ALB 3.3*         Thyroid Studies Lab Results   Component Value Date/Time    TSH 1.79 02/12/2011 03:58 AM        Procedures/imaging: see electronic medical records for all procedures/Xrays and details which were not copied into this note but were reviewed prior to creation of Plan    Ct Head Wo Cont    Result Date: 9/9/2020  EXAM: CT HEAD WO CONT CLINICAL INDICATION/HISTORY: ams COMPARISON: None. TECHNIQUE: Axial CT imaging of the head was performed without intravenous contrast. Sagittal and coronal reconstructions are provided. One or more dose reduction techniques were used on this CT: automated exposure control, adjustment of the mAs and/or kVp according to patient size, and iterative reconstruction techniques. The specific techniques used on this CT exam have been documented in the patient's electronic medical record.  Digital Imaging and Communications in Medicine (DICOM) format image data are available to nonaffiliated external healthcare facilities or entities on a secure, media free, reciprocally searchable basis with patient authorization for at least a 12-month period after this study _______________ FINDINGS: BRAIN AND POSTERIOR FOSSA: The sulci, folia, ventricles and basal cisterns are within normal limits for the patient's age. There is no intracranial hemorrhage, mass effect, or midline shift. There are scattered and confluent foci of decreased attenuation in the periventricular white matter which are nonspecific but most likely sequelae of chronic microvascular disease. EXTRA-AXIAL SPACES AND MENINGES: There are no abnormal extra-axial fluid collections. CALVARIUM: Intact. SINUSES: Clear. OTHER: Prior bilateral lens replacements. _______________     IMPRESSION: No acute intracranial abnormalities. Xr Chest Port    Result Date: 9/9/2020  EXAM: XR CHEST PORT CLINICAL INDICATION/HISTORY: OD   > Additional: Altered mental status COMPARISON: Correlation with rib series dated August 23, 2020 TECHNIQUE: Portable chest FINDINGS: SUPPORT DEVICES: None. HEART AND MEDIASTINUM: Normal size and contour. Normal pulmonary vasculature. LUNGS AND PLEURAL SPACES: Thin band of subsegmental atelectasis or scarring at the right lung base, unchanged. The lungs are otherwise well expanded and clear. No focal consolidation, effusion, or pneumothorax. BONY THORAX AND SOFT TISSUES: No acute osseous abnormality. IMPRESSION: No active cardiopulmonary disease. Xr Ribs Rt W Pa Cxr Min 3 V    Result Date: 8/23/2020  EXAM: Frontal view of the chest and 3 views of the right ribs CLINICAL INDICATION/HISTORY: Right rib pain COMPARISON: Chest radiograph dated 7/20/2011  FINDINGS: Linear opacity identified at the right lung base. No pleural effusion or pneumothorax identified. Questionable subtle cortical irregularity involving the lateral eighth and possibly ninth ribs. No other displaced rib fractures identified. IMPRESSION: 1. Equivocal findings of nondisplaced lateral right eighth and ninth rib fractures. No other rib fractures identified. 2. Linear atelectasis at the right lung base. No pleural effusion or pneumothorax identified.       Cory Fragoso MD

## 2020-09-11 NOTE — PROGRESS NOTES
[facility]  Pulmonary, Critical Care, and Sleep Medicine    Name: Jose Arnett MRN: [id number]   : 1950 Hospital: [facility]   Date: 2020        [facility]                                              Consult requesting physician: Boo Hurt MD    Reason for Consult: overdose methadone, abnormal ECG, change in mental status    [x] I have reviewed the flowsheet and previous days notes. Events, vitals, medications and notes from last 24 hours reviewed. Care plan discussed with staff, patient, family and on multidisciplinary rounds. Subjective:   2020     Patient Active Problem List   Diagnosis Code    Hypercarbia R06.89    Hypoxia R09.02    Methadone overdose ([facility] T40.3X1A    LIONEL (acute kidney injury) ([facility] [address] N17.9    Prolonged Q-T interval on ECG R94.31    Bradycardia R00.1    Encephalopathy acute G93.40    Thrombocytopenia ([facility]) D69.6    Abnormal echocardiogram R93.1    Acute on chronic respiratory failure with hypoxia and hypercapnia ([facility]) J96.21, J96.22       IMPRESSION:   · Hypercarbic hypoxemic respiratory failure rated methadone withdrawal pain  · Patient has a history of asthma. · History of asthma start bronchodilators. · Overdose of methadone. · Abnormal EKG  · Encephalopathy  · Suicidal attempt  · Depression  · Previous suicidal attempts. ·         · Code status: full      RECOMMENDATIONS:   · CVS: follow QTc, trop echo ? vegetation less benjamin spoke to cardiology   ·  Bradycardia related to overdose of methadone, given Narcan now improved. · Ativan and Precedex as needed.   · Monitor EKG, electrolytes, EKG, troponins, echo  · Respiratory: has COPD  · BIPAP PRN  · Bronchodilators   · Prn narcan now off drip  ·  Mild hypercarbia hypoxemia related to overdose of methadone improving now follow ABG, start bronchodilators patient he says history of asthma  · ID: No cough or fever or reported an illness recently follow white blood cells  · Hematology/Oncology: History of anemia follow-up hemoglobin  · Renal: Follow CPK renal function  · GI/: DVT GI prophylaxis  · Endocrine: Low glucose sliding scale insulin  · Neurology : CT of the head negative, now awake and alert. · In withdrawal. Resume psychiatric meds if psychiatry ok  · On focal  · Pain/Sedation: Avoid opiates if in severe pain consider low-dose Percocet  · Musculoskeletal: Non  · Prophylaxis: GI Prophylaxis with PI DVT Prophylaxis with Lovenox  · Disposition: To remain ICU for 24 hours  ·                                                                      OTHER:   · Glycemic Control. Glucose stabilizer per ICU protocol when on insulin drip. Maintain blood glucose 140-180. · Replace electrolytes per ICU electrolyte replacement protocol  ·   · HOB >=30 degree elevation all the time. Aggressive pulmonary toileting. Incentive spirometry when appropriate. Aspiration precautions. · Sepsis bundle and protocol followed. Follow serial lactic acid when >2, frequent BMP check, fluid resuscitation. Draw cultures before antibiotic. Follow cultures. Antibiotic choice. Administer antibiotic within 1 hr ICU admission and 3 hours of ED arrival. Deescalate antibiotic when appropriate. · Vasopressor when appropriate with MAP goal >65 mmHg. · Central Line bundle followed, remove when not needed. Large bore IV line or CVP when appropriate. · Epstein bundle followed, remove epstein catheter when not critically ill. · Skin & Wound care. · Weekly prealbumin, nutritional consult. · PT/OT eval and treat. OOB/IS when appropriate. · Palliative care consult when appropriate. · Further recommendations will be based on the patient's response to recommended treatment and results of the investigation ordered. Quality Care: Stress ulcer prophylaxis, DVT prophylaxis, HOB elevated, Infection control all reviewed and addressed. Events and notes from last 24 hours reviewed.  Care plan discussed with nursing, MDR  D/w patient above medical problems, labs, radiologic w/u, prognosis, code status, medication/procedure side effects/complications etc discussed, and answered all questions to their satisfaction. See my orders for detail. CC TIME: 55 minutes of critical care time min      · Prior/Old records reviewed and discussed with patient. · Labs, Images and available PFT and sleep study discussed with patient. Labs and images personally seen and available reports reviewed with patient. · All current medicines are reviewed and doses and prescription adjusted. · Further management depending on test results and work up as outlined above. The patient is: [x] acutely ill Risk of deterioration: [] moderate    [x] critically ill  [x] high       History of Present Illness:     Tavo Ortega has been seen and evaluated in ICU Patient is a 72 y.o. female with PMHx significant for heroin abuse currently on methadone, history of severe depression, history of previous suicidal attempt last psychiatric hospitalization 2018 in [address] [place] [address], history of smoking and asthma, diabetes, hypertension. Patient was admitted to intensive care unit for observation and Narcan drip and abnormal EKG after she was found to be poorly responsive and overdose on methadone. EMS brought patient with change in mental status and after 1 dose of Narcan she started to slightly improve she was eventually started on a Narcan drip. She was found to have a bradycardia and prolonged QTC. CT of the head was normal.  Blood pressure stable. Patient eventually today in intensive care unit work-up. Follow command. And started to go through opiate withdrawal.  So methadone was stopped. She was given low dose of Ativan and low-dose of Precedex. Monitoring closely blood pressure EKG and electrolytes. We have consulted psychiatry. Possible another suicidal attempt. Patient needs to have a sitter.   Psychiatry is advised to treat withdrawal and wait with resume doing her methadone at this point. Mild hypoxemia but no cough and fever no cough with contacts. GERD with known asthma on bronchodilators at home    9/11/2020  patient remains in ICU on BIPAP  Blood culure  cardiology evaluating  echo  ABG follow up  Copd tx       The patient is critically ill and can not provide additional history due to Unable to comprehend. History taken from ed//chart    Review of Systems:  A comprehensive review of systems was negative except for that written in the HPI. Medication Review:  · Pressors - none  · Sedation - ativan prn , precedex prn  · Antibiotics - none  · Pain - prn tylenol avoid opiates   · GI/ DVT - dvt/gi prophylaxis   · Others (other gtts) - ivf    Safety Bundles: VAP Bundle/ CAUTI/ Severe Sepsis Protocol/ Electrolyte Replacement Protocol      Allergies   Allergen Reactions    Erythromycin Hives    Penicillins Hives    Tetracycline Hives      Past Medical History:   Diagnosis Date    Chronic back pain     Diabetes ([facility] [address]     Drug abuse ([facility] [address] [facility]     Hepatic cirrhosis (HCC)     Hepatitis C     Heroin abuse ([facility] [address] [facility]     Pain management     Restless leg syndrome     Sciatica     Spleen enlarged     Thyroid disease       Past Surgical History:   Procedure Laterality Date    HX CHOLECYSTECTOMY      HX GYN      hysterectomy    HX HEENT      T&A    HX ORTHOPAEDIC      Bunion, left hand and right arm abscess removal      Social History     Tobacco Use    Smoking status: Current Every Day Smoker    Smokeless tobacco: Never Used   Substance Use Topics    Alcohol use: No      History reviewed. No pertinent family history. Prior to Admission medications    Medication Sig Start Date End Date Taking? Authorizing Provider   prazosin (MINIPRESS) 2 mg capsule Take 2 mg by mouth nightly. Yes Provider, Historical   vit A/vit C/vit E/zinc/copper (PRESERVISION AREDS PO) Take 1 Cap by mouth two (2) times a day.    Yes Provider, Historical rifAXIMin (XIFAXAN) 550 mg tablet Take 550 mg by mouth two (2) times a day. Yes Provider, Historical   zinc 50 mg tab tablet Take 50 mg by mouth daily. Yes Provider, Historical   lurasidone (Latuda) 40 mg tab tablet Take 40 mg by mouth daily (with dinner). Yes Provider, Historical   pregabalin (Lyrica) 75 mg capsule Take 75 mg by mouth two (2) times a day. Yes Provider, Historical   escitalopram oxalate (LEXAPRO) 20 mg tablet Take 10 mg by mouth daily. Yes Provider, Historical   methadone (DOLOPHINE) 10 mg/mL solution Take 65 mg by mouth daily. Yes Provider, Historical   hydrOXYzine (VISTARIL) 25 mg capsule Take 50 mg by mouth two (2) times daily as needed for Itching or Anxiety. Blu, MD Boo   levothyroxine (SYNTHROID) 50 mcg tablet Take 50 mcg by mouth Daily (before breakfast). Boo Hurt MD   rOPINIRole (REQUIP) 0.25 mg tablet Take 2 mg by mouth nightly as needed. Indications: restless legs syndrome, an extreme discomfort in the calf muscles when sitting or lying down    Boo Hurt MD   spironolactone (ALDACTONE) 25 mg tablet Take  by mouth daily. Boo Hurt MD   traZODone (DESYREL) 100 mg tablet Take 200 mg by mouth nightly.     Boo Hurt MD     Current Facility-Administered Medications   Medication Dose Route Frequency    clindamycin (CLEOCIN) 600mg NS 50 mL IVPB (premix)  600 mg IntraVENous Q8H    dextrose 5% and 0.9% NaCl infusion  25 mL/hr IntraVENous CONTINUOUS    nicotine (NICODERM CQ) 14 mg/24 hr patch 1 Patch  1 Patch TransDERmal DAILY    budesonide (PULMICORT) 500 mcg/2 ml nebulizer suspension  500 mcg Nebulization BID RT    albuterol-ipratropium (DUO-NEB) 2.5 MG-0.5 MG/3 ML  3 mL Nebulization TID RT    arformoteroL (BROVANA) neb solution 15 mcg  15 mcg Nebulization BID RT    naloxone (NARCAN) 10 mg in 0.9% sodium chloride 250 mL infusion  0.05-2 mg/hr IntraVENous TITRATE    sodium chloride (NS) flush 5-40 mL  5-40 mL IntraVENous Q8H    0.9% sodium chloride infusion  75 mL/hr IntraVENous CONTINUOUS    insulin lispro (HUMALOG) injection   SubCUTAneous Q6H    pantoprazole (PROTONIX) 40 mg in 0.9% sodium chloride 10 mL injection  40 mg IntraVENous Q24H       Lines: All central lines examined by me. No signs of erythema, induration, discharge. Feeding Tube:                        Sheath:                          Infusion Wire/Catheter:      Peripherally Inserted Central Catheter:     Central Venous Catheter:     Venous Access Device:     Peripheral Intravenous Line:  Peripheral IV 20 Left Arm (Active)   Site Assessment Clean, dry, & intact 09/10/20 0400   Phlebitis Assessment 0 09/10/20 0400   Infiltration Assessment 0 09/10/20 0400   Dressing Status Clean, dry, & intact 09/10/20 0400   Dressing Type Transparent 09/10/20 0400   Hub Color/Line Status Blue 09/10/20 0400   Action Taken Blood drawn 20 1803   Alcohol Cap Used Yes 09/10/20 0400       Peripheral IV 20 Right Forearm (Active)   Site Assessment Clean, dry, & intact 09/10/20 0400   Phlebitis Assessment 0 09/10/20 0400   Infiltration Assessment 0 09/10/20 0400   Dressing Status Clean, dry, & intact 09/10/20 0400   Dressing Type Transparent 09/10/20 0400   Hub Color/Line Status Blue; Infusing 09/10/20 0400   Alcohol Cap Used Yes 09/10/20 0400                           Telemetry:normal sinus rhythm    Objective:   Vital Signs:    Visit Vitals  /55   Pulse (!) 59   Temp 97.6 °F (36.4 °C)   Resp 20   Ht 5' 2\" (1.575 m)   Wt 84.4 kg (186 lb)   SpO2 97%   BMI 34.02 kg/m²       O2 Device: Nasal cannula   O2 Flow Rate (L/min): 4 l/min   Temp (24hrs), Av.6 °F (36.4 °C), Min:97.1 °F (36.2 °C), Max:98.3 °F (36.8 °C)       Intake/Output:   Last shift:      No intake/output data recorded.     Last 3 shifts:  1901 -  0700  In: .7 [I.V.:.7]  Out: 365 [Urine:365]      Intake/Output Summary (Last 24 hours) at 2020 0950  Last data filed at 2020 0645  Gross per 24 hour   Intake 797.4 ml   Output 115 ml   Net 682.4 ml       Last 3 Recorded Weights in this Encounter    09/10/20 0123 09/10/20 1451   Weight: 84.5 kg (186 lb 4.6 oz) 84.4 kg (186 lb)       Ventilator Settings:  Mode Rate Tidal Volume Pressure FiO2 PEEP            30 %       Peak airway pressure:      Plateau pressure:     Tidal volume:    Minute ventilation: 3.8 l/min   SPO2      ARDS network Guidelines: Lung protective strategy and Plateau pressure goals less than or equal to 30      Physical Exam:     General/Neurology: Alert, Awake, moderate distress agitated  Head:   Normocephalic, without obvious abnormality, atraumatic. Eye:   PERRL EOM intact no scleral icterus, no pallor, no cyanosis  Nose:   No sinus tenderness, no erythema, no induration, no discharge  Throat:  Lips, mucosa, and tongue normal. Teeth and gums normal. No tonsillar enlargement, no erythema, no exudates. No oral thrush  Neck:   Supple, symmetric. Thyroid: no enlargement/tenderness/nodule. No carotid bruit. No lymphadenopathy. Trachea midline  Back & spine: Symmetric, no curvature. Chest wall: No tenderness or deformity. No rash  Lung: Moderate air entry bilateral equal. No rales. No rhonchi. No wheezing. No stridors. No prolongdx expiration. No accessory muscle use. No dullness on percussion. Heart:   Regular rate & rhythm. S1 S2 present. No murmur. No gallop. No click. No rub. No JVD. Abdomen/: Soft. NT. ND. +BS. No masses. No organomegaly. Extremities:  No pedal edema. No cyanosis. No clubbing  Pulses: 2+ and symmetric in DP  Lymphatic:  No cervical, supraclavicular or axillary palpable lymphadenopathy. Musculoskeletal: No joint swelling. No tenderness.   Skin:   Color, texture, turgor normal. No rashes or lesions        Data:       Recent Results (from the past 24 hour(s))   GLUCOSE, POC    Collection Time: 09/10/20 11:40 AM   Result Value Ref Range    Glucose (POC) 131 (H) 70 - 110 mg/dL   ECHO ADULT COMPLETE    Collection Time: 09/10/20 2:51 PM   Result Value Ref Range    IVSd 0.96 (A) 0.6 - 0.9 cm    LVIDd 5.32 (A) 3.9 - 5.3 cm    LVIDs 3.58 cm    LVOT d 1.91 cm    LVPWd 0.65 0.6 - 0.9 cm    BP EF 62.1 55 - 100 %    LV Ejection Fraction MOD 2C 63 %    LV Ejection Fraction MOD 4C 63 %    LV ED Vol A2C 120.9 mL    LV ED Vol A4C 117.9 mL    LV ED Vol .5 (A) 56 - 104 mL    LV ES Vol A2C 44.2 mL    LV ES Vol A4C 43.5 mL    LV ES Vol BP 47.2 19 - 49 mL    LVOT Peak Gradient 3.9 mmHg    Left Ventricular Outflow Tract Mean Gradient 1.428201538 mmHg    LVOT SV 71.4 mL    LVOT Peak Velocity 98.27 cm/s    LVOT VTI 24.98 cm    RVIDd 4.44 cm    Left Atrium Major Axis 5.00 cm    LA Volume 100.75 22 - 52 mL    LA Vol 2C 80.69 (A) 22 - 52 mL    LA Vol 4C 92.82 (A) 22 - 52 mL    Right Atrial Area 4C 19.16 cm2    Aortic Valve Area by Continuity of Peak Velocity 2.1 cm2    Aortic Valve Area by Continuity of VTI 2.2 cm2    Aortic Regurgitant Pressure Half-time 827.2 cm    AR Max Carter 322.36 cm/s    AoV PG 7.2 mmHg    Aortic Valve Systolic Mean Gradient 3.3 mmHg    Aortic Valve Systolic Peak Velocity 067.39 cm/s    AoV VTI 32.00 cm    PISA MR Rad 0.64 cm    MV A Carter 49.56 cm/s    Mitral Valve E Wave Deceleration Time 349.5 ms    MV E Carter 71.56 cm/s    Mitral Valve Pressure Half-time 101.3 ms    MVA (PHT) 2.2 cm2    Mitral Effective Regurgitant Orifice Area 0.20 cm2    MV regurgitant volume 37.23 cc    MR Peak Gradient 1,113,658.1 mmHg    TV MG 76.18 mmHg    Pulmonic Regurgitant End Max Velocity 535.41 cm/s    Mitral Regurgitant Velocity Time Integral 203.58 cm    Pulmonic Regurgitant End Max Velocity 519.88 cm/s    Mitral Regurgitant Velocity Time Integral 199.10 cm    Pulmonic Regurgitant End Max Velocity 88.17 cm/s    Triscuspid Valve Regurgitation Peak Gradient 33.8 mmHg    TR Max Velocity 290.85 cm/s    AO ASC D 3.65 cm    Ao Root D 3.39 cm    Mitral Valve Area by Proximal Isovelocity Surface Area 0.2 cm2    MV E/A 1.44     LV Mass .2 (A) 67 - 162 g LV Mass AL Index 82.1 43 - 95 g/m2    LVES Vol Index BP 25.5 mL/m2    LVED Vol Index BP 67.2 mL/m2    Mitral Regurgitant Peak Velocity 527.65 cm/s    Left Atrium Minor Axis 2.70 cm    LA Vol Index 54.36 16 - 28 ml/m2    LA Vol Index 43.54 16 - 28 ml/m2    LA Vol Index 50.08 16 - 28 ml/m2    LVED Vol Index A4C 63.6 mL/m2    LVED Vol Index A2C 65.2 mL/m2    LVES Vol Index A4C 23.5 mL/m2    LVES Vol Index A2C 23.8 mL/m2    ANGELO/BSA Pk Carter 1.1 cm2/m2    ANGELO/BSA VTI 1.2 cm2/m2    AR Max Carter 322.36 cm/s    Left Ventricular Fractional Shortening by 2D 46.585100672 %    Left Ventricular Outflow Tract Mean Velocity 3.2648991675 cm/s    PV End Diastolic Velocity 0.9 mmHg    Mitral Valve Deceleration Blaine 8.0318568946     Mitral valve mean inflow velocity 4.1567294219 m/s    AV Velocity Ratio 0.73     AV VTI Ratio 0.8     Aortic valve mean velocity 6.9009024802 m/s    AV peak gradient 76.292478323 mmHg    Left Ventricular End Diastolic Volume by Teichholz Method 2.05470175428 mL    Left Ventricular End Systolic Volume by Teichholz Method 7.44205058369 mL    Left Ventricular Stroke Volume by 2-D Biplane-MOD 93.663189265 mL    Left Ventricular Stroke Volume by 2-D Single Plane- MOD 68.088819198 mL    Left Ventricular Stroke Volume by Teichholz Method 62.144145486 mL    Left Ventricular Stroke Volume by 2-D Single Plane- MOD 89.291723034 mL    LV E' Septal Velocity 9.00 cm/s    LV E' Lateral Velocity 10.00 cm/s    E/E' lateral 7.16     E/E' septal 7.95     E/E' ratio (averaged) 7.55    POC G3    Collection Time: 09/10/20  2:59 PM   Result Value Ref Range    Device: NASAL CANNULA      Flow rate (POC) 2 L/M    FIO2 (POC) 28 %    pH (POC) 7.23 (LL) 7.35 - 7.45      pCO2 (POC) 67.3 (H) 35.0 - 45.0 MMHG    pO2 (POC) 60 (L) 80 - 100 MMHG    HCO3 (POC) 28.0 (H) 22 - 26 MMOL/L    sO2 (POC) 84 (L) 92 - 97 %    Base excess (POC) 0 mmol/L    Allens test (POC) YES      Site RIGHT RADIAL      Specimen type (POC) ARTERIAL      Performed by Becca Garza    GLUCOSE, POC    Collection Time: 09/10/20  5:59 PM   Result Value Ref Range    Glucose (POC) 96 70 - 110 mg/dL   GLUCOSE, POC    Collection Time: 09/10/20 11:32 PM   Result Value Ref Range    Glucose (POC) 71 70 - 110 mg/dL   CBC WITH AUTOMATED DIFF    Collection Time: 09/11/20  4:20 AM   Result Value Ref Range    WBC 4.1 (L) 4.6 - 13.2 K/uL    RBC 4.36 4.20 - 5.30 M/uL    HGB 11.9 (L) 12.0 - 16.0 g/dL    HCT 37.5 35.0 - 45.0 %    MCV 86.0 74.0 - 97.0 FL    MCH 27.3 24.0 - 34.0 PG    MCHC 31.7 31.0 - 37.0 g/dL    RDW 15.9 (H) 11.6 - 14.5 %    PLATELET 50 (L) 877 - 420 K/uL    MPV 10.6 9.2 - 11.8 FL    NEUTROPHILS 60 40 - 73 %    LYMPHOCYTES 25 21 - 52 %    MONOCYTES 14 (H) 3 - 10 %    EOSINOPHILS 1 0 - 5 %    BASOPHILS 0 0 - 2 %    ABS. NEUTROPHILS 2.4 1.8 - 8.0 K/UL    ABS. LYMPHOCYTES 1.0 0.9 - 3.6 K/UL    ABS. MONOCYTES 0.6 0.05 - 1.2 K/UL    ABS. EOSINOPHILS 0.0 0.0 - 0.4 K/UL    ABS. BASOPHILS 0.0 0.0 - 0.1 K/UL    DF AUTOMATED     MAGNESIUM    Collection Time: 09/11/20  4:20 AM   Result Value Ref Range    Magnesium 2.2 1.6 - 2.6 mg/dL   METABOLIC PANEL, COMPREHENSIVE    Collection Time: 09/11/20  4:20 AM   Result Value Ref Range    Sodium 142 136 - 145 mmol/L    Potassium 4.7 3.5 - 5.5 mmol/L    Chloride 110 100 - 111 mmol/L    CO2 28 21 - 32 mmol/L    Anion gap 4 3.0 - 18 mmol/L    Glucose 65 (L) 74 - 99 mg/dL    BUN 23 (H) 7.0 - 18 MG/DL    Creatinine 0.87 0.6 - 1.3 MG/DL    BUN/Creatinine ratio 26 (H) 12 - 20      GFR est AA >60 >60 ml/min/1.73m2    GFR est non-AA >60 >60 ml/min/1.73m2    Calcium 8.8 8.5 - 10.1 MG/DL    Bilirubin, total 0.8 0.2 - 1.0 MG/DL    ALT (SGPT) 48 13 - 56 U/L    AST (SGOT) 175 (H) 10 - 38 U/L    Alk.  phosphatase 111 45 - 117 U/L    Protein, total 6.7 6.4 - 8.2 g/dL    Albumin 3.3 (L) 3.4 - 5.0 g/dL    Globulin 3.4 2.0 - 4.0 g/dL    A-G Ratio 1.0 0.8 - 1.7     PHOSPHORUS    Collection Time: 09/11/20  4:20 AM   Result Value Ref Range    Phosphorus 3.0 2.5 - 4.9 MG/DL   GLUCOSE, POC    Collection Time: 09/11/20  5:30 AM   Result Value Ref Range    Glucose (POC) 79 70 - 110 mg/dL   CALCIUM, IONIZED    Collection Time: 09/11/20  7:50 AM   Result Value Ref Range    Ionized Calcium 1.03 (L) 1.12 - 1.32 MMOL/L   EKG, 12 LEAD, SUBSEQUENT    Collection Time: 09/11/20  8:31 AM   Result Value Ref Range    Ventricular Rate 71 BPM    Atrial Rate 71 BPM    P-R Interval 162 ms    QRS Duration 90 ms    Q-T Interval 446 ms    QTC Calculation (Bezet) 484 ms    Calculated P Axis 40 degrees    Calculated R Axis 1 degrees    Calculated T Axis 34 degrees    Diagnosis       Normal sinus rhythm  Normal ECG  When compared with ECG of 09-SEP-2020 17:08,  Nonspecific T wave abnormality no longer evident in Anterior leads     POC G3    Collection Time: 09/11/20  8:46 AM   Result Value Ref Range    Device: NASAL CANNULA      Flow rate (POC) 3 L/M    FIO2 (POC) 32 %    pH (POC) 7.21 (LL) 7.35 - 7.45      pCO2 (POC) 69.7 (H) 35.0 - 45.0 MMHG    pO2 (POC) 60 (L) 80 - 100 MMHG    HCO3 (POC) 27.7 (H) 22 - 26 MMOL/L    sO2 (POC) 84 (L) 92 - 97 %    Base excess (POC) 0 mmol/L    Allens test (POC) YES      Site LEFT RADIAL      Specimen type (POC) ARTERIAL      Performed by Lupe Hedrick          Chemistry Recent Labs     09/11/20  0420 09/10/20  0455 09/09/20  1710   GLU 65* 86 154*    138 137   K 4.7 4.3 4.7    108 101   CO2 28 27 30   BUN 23* 24* 25*   CREA 0.87 0.92 1.47*   CA 8.8 8.3* 9.2   MG 2.2 2.0 2.0   PHOS 3.0  --   --    AGAP 4 3 6   BUCR 26* 26* 17    111 128*   TP 6.7 5.8* 6.6   ALB 3.3* 2.9* 3.3*   GLOB 3.4 2.9 3.3   AGRAT 1.0 1.0 1.0        Lactic Acid No results found for: LAC  No results for input(s): LAC in the last 72 hours.      Liver Enzymes Protein, total   Date Value Ref Range Status   09/11/2020 6.7 6.4 - 8.2 g/dL Final     Albumin   Date Value Ref Range Status   09/11/2020 3.3 (L) 3.4 - 5.0 g/dL Final     Globulin   Date Value Ref Range Status 09/11/2020 3.4 2.0 - 4.0 g/dL Final     A-G Ratio   Date Value Ref Range Status   09/11/2020 1.0 0.8 - 1.7   Final     Alk. phosphatase   Date Value Ref Range Status   09/11/2020 111 45 - 117 U/L Final     Recent Labs     09/11/20  0420 09/10/20  0455 09/09/20  1710   TP 6.7 5.8* 6.6   ALB 3.3* 2.9* 3.3*   GLOB 3.4 2.9 3.3   AGRAT 1.0 1.0 1.0    111 128*        CBC w/Diff Recent Labs     09/11/20  0420 09/10/20  0455 09/09/20  1730   WBC 4.1* 2.3* 2.2*   RBC 4.36 3.93* 3.99*   HGB 11.9* 10.5* 10.8*   HCT 37.5 32.7* 32.9*   PLT 50* 43* 41*   GRANS 60 71 80*   LYMPH 25 20* 13*   EOS 1 0 0        Cardiac Enzymes No results found for: CPK, CK, CKMMB, CKMB, RCK3, CKMBT, CKNDX, CKND1, NYASIA, TROPT, TROIQ, JARED, TROPT, TNIPOC, BNP, BNPP     BNP No results found for: BNP, BNPP, XBNPT     Coagulation No results for input(s): PTP, INR, APTT, INREXT, INREXT in the last 72 hours.       Thyroid  Lab Results   Component Value Date/Time    TSH 1.79 02/12/2011 03:58 AM       No results found for: T4     Lipid Panel No results found for: CHOL, CHOLPOCT, CHOLX, CHLST, CHOLV, 820199, HDL, HDLP, LDL, LDLC, DLDLP, 623008, VLDLC, VLDL, Jillene Cindy, CHHD, CHHDX     ABG  Recent Labs     09/11/20  0846 09/10/20  1459 09/09/20  1740   PHI 7.21* 7.23* 7.34*   PCO2I 69.7* 67.3* 51.0*   PO2I 60* 60* 60*   HCO3I 27.7* 28.0* 27.2*   FIO2I 32 28  --         Urinalysis  Lab Results   Component Value Date/Time    Color DARK YELLOW 09/09/2020 04:30 PM    Appearance CLOUDY 09/09/2020 04:30 PM    Specific gravity 1.020 09/09/2020 04:30 PM    pH (UA) 6.0 09/09/2020 04:30 PM    Protein TRACE (A) 09/09/2020 04:30 PM    Glucose Negative 09/09/2020 04:30 PM    Ketone Negative 09/09/2020 04:30 PM    Bilirubin Negative 09/09/2020 04:30 PM    Urobilinogen 1.0 09/09/2020 04:30 PM    Nitrites Negative 09/09/2020 04:30 PM    Leukocyte Esterase Negative 09/09/2020 04:30 PM    Epithelial cells 2+ 09/09/2020 04:30 PM    Bacteria FEW (A) 09/09/2020 04:30 PM    WBC 0 to 3 09/09/2020 04:30 PM    RBC 0 to 2 09/09/2020 04:30 PM        Micro   No results for input(s): SDES, CULT in the last 72 hours. No results for input(s): CULT in the last 72 hours. EKG  No results found for this or any previous visit. PFT  No flowsheet data found. Other  ASA reactivity:   Pre-albumin:   Ionized Calcium:   NH4:   T3, FT4:  Cortisol:  Urine Osm:  Urine Lytes:   HbA1c:      Ultrasound       LE Doppler       ECHO       CT (Most Recent)  Results from Hospital Encounter encounter on 09/09/20   CT HEAD WO CONT    Narrative EXAM: CT HEAD WO CONT    CLINICAL INDICATION/HISTORY: ams    COMPARISON: None. TECHNIQUE: Axial CT imaging of the head was performed without intravenous  contrast. Sagittal and coronal reconstructions are provided. One or more dose  reduction techniques were used on this CT: automated exposure control,  adjustment of the mAs and/or kVp according to patient size, and iterative  reconstruction techniques. The specific techniques used on this CT exam have  been documented in the patient's electronic medical record. Digital Imaging and  Communications in Medicine (DICOM) format image data are available to  nonaffiliated external healthcare facilities or entities on a secure, media  free, reciprocally searchable basis with patient authorization for at least a  12-month period after this study      _______________    FINDINGS:    BRAIN AND POSTERIOR FOSSA: The sulci, folia, ventricles and basal cisterns are  within normal limits for the patient's age. There is no intracranial hemorrhage,  mass effect, or midline shift. There are scattered and confluent foci of decreased attenuation in the  periventricular white matter which are nonspecific but most likely sequelae of  chronic microvascular disease. EXTRA-AXIAL SPACES AND MENINGES: There are no abnormal extra-axial fluid  collections. CALVARIUM: Intact. SINUSES: Clear.     OTHER: Prior bilateral lens replacements.    _______________      Impression IMPRESSION:    No acute intracranial abnormalities. XR (Most Recent). CXR reviewed by me and compared with previous CXR Results from Hospital Encounter encounter on 09/09/20   XR CHEST PORT    Narrative EXAM: XR CHEST PORT    CLINICAL INDICATION/HISTORY: OD    > Additional: Altered mental status    COMPARISON: Correlation with rib series dated August 23, 2020    TECHNIQUE: Portable chest    _______________    FINDINGS:    SUPPORT DEVICES: None. HEART AND MEDIASTINUM: Normal size and contour. Normal pulmonary vasculature. LUNGS AND PLEURAL SPACES: Thin band of subsegmental atelectasis or scarring at  the right lung base, unchanged. The lungs are otherwise well expanded and clear. No focal consolidation, effusion, or pneumothorax. BONY THORAX AND SOFT TISSUES: No acute osseous abnormality. _______________      Impression IMPRESSION:    No active cardiopulmonary disease. High complexity decision making was performed during the evaluation of this patient at high risk for decompensation with multiple organ involvement    [x] Above mentioned total time spent on reviewing the case/medical record/data/notes/EMR/patient examination/documentation/coordinating care with nurse/consultants, exclusive of procedures with complex decision making performed and > 50% time spent in face to face evaluation.     Cc 45   Pola Cisneros MD  9/11/2020

## 2020-09-11 NOTE — PROGRESS NOTES
191-Bedside verbal report received from Ayden Macias RN. Sbar, mar, labs, kardex and patient status reviewed. Assumed care of patient. Sitter remains at bedside. -Assessment completed. Pt remains drowsy but responds to voice. Appears comfortable on Bipap. 0030-Assessment completed at this time. Patient has not voided this shift. Bladder scanned; 348ml Urine in bladder. Pt states she is unable to void on bedpan. Purewick applied and instructed to attempt to void. Patient is not steady enough (Very trimerous to stand to [facility]) at this time. Will allow patient time to void with purewick. 0140-Pt still hasn't voided at this time. Dr. Marjorie Vargas on unit. Orders received to straight cath patient x1.     0215-When cleansing for Straight cath patient voided large amount. Kemi Alyce in place and incontinent care provided. 0400-Assessment completed. No changes       0645-Pt's , Jessica Low called for update. Verified Phone number and patient's  for verification. Updated on current status and unable to take phone calls at this time. Gave passcode and ICU nurses station # to call for updates. Would like MD to call with updates as well. Will notify oncoming RN.     0720-Bedside verbal report given to Ayden Macias RN. Sbar, mar, labs, kardex and patient status reviewed. Relinquished care of patient.

## 2020-09-11 NOTE — PROGRESS NOTES
Chart reviewed pt remains in ICU for level of care, will remain over weekend, pt remains confused at this time, still requiring BIPAP at this time.

## 2020-09-11 NOTE — PROGRESS NOTES
OT NOTE:    Attempted to complete OT evaluation this date. RN Aurelio requested to hold therapy again today due to pt being on BIPAP and unable to tolerate OOB activity this date. Will attempt to complete OT evaluation at next scheduled date as able.     Thank you for this referral.   Mira Queen OTR/BRICE MOT

## 2020-09-11 NOTE — PROGRESS NOTES
baseline copd/ methadone overdose on BIPAP  Very agisted  Severe opiates withdrawal . Due to QTc not safe to resume  today methadone  If not able to continue BIPAP I will have to intubate  I spoke to   Case also discussed with psychiatry/cardiology  ARASELI Townsend

## 2020-09-11 NOTE — PROGRESS NOTES
She currently on the BiPAP and Precedex. Still hypercarbic hypoxemic respiratory failure getting good volume saturation improved. I spoke to . If she does not improve she is going to have to be intubated. Going to give her about 1 2 hours trial on the BiPAP. Follow-up ABG today 7- 8:00.pm  Patient understands current situation. 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   Mitesh Ni MD

## 2020-09-12 ENCOUNTER — APPOINTMENT (OUTPATIENT)
Dept: CT IMAGING | Age: 70
DRG: 917 | End: 2020-09-12
Attending: INTERNAL MEDICINE
Payer: MEDICARE

## 2020-09-12 ENCOUNTER — HOSPITAL ENCOUNTER (INPATIENT)
Dept: ULTRASOUND IMAGING | Age: 70
Discharge: HOME | DRG: 917 | End: 2020-09-12
Attending: INTERNAL MEDICINE
Payer: MEDICARE

## 2020-09-12 ENCOUNTER — APPOINTMENT (OUTPATIENT)
Dept: VASCULAR SURGERY | Age: 70
DRG: 917 | End: 2020-09-12
Attending: INTERNAL MEDICINE
Payer: MEDICARE

## 2020-09-12 ENCOUNTER — APPOINTMENT (OUTPATIENT)
Dept: GENERAL RADIOLOGY | Age: 70
DRG: 917 | End: 2020-09-12
Attending: INTERNAL MEDICINE
Payer: MEDICARE

## 2020-09-12 PROBLEM — D61.818 PANCYTOPENIA (HCC): Status: ACTIVE | Noted: 2020-09-12

## 2020-09-12 LAB
ALBUMIN SERPL-MCNC: 3.1 G/DL (ref 3.4–5)
ALBUMIN/GLOB SERPL: 1 {RATIO} (ref 0.8–1.7)
ALP SERPL-CCNC: 94 U/L (ref 45–117)
ALT SERPL-CCNC: 43 U/L (ref 13–56)
ANION GAP SERPL CALC-SCNC: 5 MMOL/L (ref 3–18)
ARTERIAL PATENCY WRIST A: YES
AST SERPL-CCNC: 129 U/L (ref 10–38)
ATRIAL RATE: 49 BPM
AVAPS, IAVAPS: YES
AVAPS, IAVAPS: YES
BASE DEFICIT BLD-SCNC: 1 MMOL/L
BASE EXCESS BLD CALC-SCNC: 1 MMOL/L
BASE EXCESS BLD CALC-SCNC: 2 MMOL/L
BASOPHILS # BLD: 0 K/UL (ref 0–0.1)
BASOPHILS NFR BLD: 0 % (ref 0–3)
BDY SITE: ABNORMAL
BILIRUB SERPL-MCNC: 0.9 MG/DL (ref 0.2–1)
BNP SERPL-MCNC: 2390 PG/ML (ref 0–900)
BODY TEMPERATURE: 97.7
BUN SERPL-MCNC: 24 MG/DL (ref 7–18)
BUN/CREAT SERPL: 29 (ref 12–20)
CA-I SERPL-SCNC: 1.25 MMOL/L (ref 1.12–1.32)
CALCIUM SERPL-MCNC: 9.1 MG/DL (ref 8.5–10.1)
CALCULATED P AXIS, ECG09: 53 DEGREES
CALCULATED R AXIS, ECG10: 31 DEGREES
CALCULATED T AXIS, ECG11: 44 DEGREES
CHLORIDE SERPL-SCNC: 111 MMOL/L (ref 100–111)
CK MB CFR SERPL CALC: 0.2 % (ref 0–4)
CK MB SERPL-MCNC: 7.3 NG/ML (ref 5–25)
CK SERPL-CCNC: 3755 U/L (ref 26–192)
CO2 SERPL-SCNC: 27 MMOL/L (ref 21–32)
CREAT SERPL-MCNC: 0.82 MG/DL (ref 0.6–1.3)
DIAGNOSIS, 93000: NORMAL
DIFFERENTIAL METHOD BLD: ABNORMAL
EOSINOPHIL # BLD: 0 K/UL (ref 0–0.4)
EOSINOPHIL NFR BLD: 0 % (ref 0–5)
ERYTHROCYTE [DISTWIDTH] IN BLOOD BY AUTOMATED COUNT: 15.4 % (ref 11.6–14.5)
FERRITIN SERPL-MCNC: 87 NG/ML (ref 8–388)
FIBRINOGEN PPP-MCNC: 296 MG/DL (ref 210–451)
GAS FLOW.O2 O2 DELIVERY SYS: ABNORMAL L/MIN
GLOBULIN SER CALC-MCNC: 3 G/DL (ref 2–4)
GLUCOSE BLD STRIP.AUTO-MCNC: 126 MG/DL (ref 70–110)
GLUCOSE BLD STRIP.AUTO-MCNC: 127 MG/DL (ref 70–110)
GLUCOSE BLD STRIP.AUTO-MCNC: 128 MG/DL (ref 70–110)
GLUCOSE BLD STRIP.AUTO-MCNC: 172 MG/DL (ref 70–110)
GLUCOSE SERPL-MCNC: 110 MG/DL (ref 74–99)
HCO3 BLD-SCNC: 26.8 MMOL/L (ref 22–26)
HCO3 BLD-SCNC: 26.9 MMOL/L (ref 22–26)
HCO3 BLD-SCNC: 28.6 MMOL/L (ref 22–26)
HCT VFR BLD AUTO: 30.7 % (ref 35–45)
HGB BLD-MCNC: 9.8 G/DL (ref 12–16)
INR PPP: 1.5 (ref 0.8–1.2)
IRON SATN MFR SERPL: 26 % (ref 20–50)
IRON SERPL-MCNC: 79 UG/DL (ref 50–175)
LDH SERPL L TO P-CCNC: 363 U/L (ref 81–234)
LYMPHOCYTES # BLD: 0.4 K/UL (ref 0.8–3.5)
LYMPHOCYTES NFR BLD: 28 % (ref 20–51)
MAGNESIUM SERPL-MCNC: 2 MG/DL (ref 1.6–2.6)
MCH RBC QN AUTO: 27.1 PG (ref 24–34)
MCHC RBC AUTO-ENTMCNC: 31.9 G/DL (ref 31–37)
MCV RBC AUTO: 84.8 FL (ref 74–97)
MONOCYTES # BLD: 0.1 K/UL (ref 0–1)
MONOCYTES NFR BLD: 8 % (ref 2–9)
NEUTS BAND NFR BLD MANUAL: 2 % (ref 0–5)
NEUTS SEG # BLD: 0.9 K/UL (ref 1.8–8)
NEUTS SEG NFR BLD: 62 % (ref 42–75)
O2/TOTAL GAS SETTING VFR VENT: 0.4 %
O2/TOTAL GAS SETTING VFR VENT: 40 %
O2/TOTAL GAS SETTING VFR VENT: 40 %
P-R INTERVAL, ECG05: 172 MS
PCO2 BLD: 47 MMHG (ref 35–45)
PCO2 BLD: 60.9 MMHG (ref 35–45)
PCO2 BLD: 65.2 MMHG (ref 35–45)
PEEP RESPIRATORY: 10 CMH2O
PEEP RESPIRATORY: 6 CMH2O
PEEP RESPIRATORY: 8 CMH2O
PH BLD: 7.22 [PH] (ref 7.35–7.45)
PH BLD: 7.28 [PH] (ref 7.35–7.45)
PH BLD: 7.36 [PH] (ref 7.35–7.45)
PHOSPHATE SERPL-MCNC: 2.5 MG/DL (ref 2.5–4.9)
PIP ISTAT,IPIP: 18
PIP ISTAT,IPIP: 22
PIP ISTAT,IPIP: 34
PLATELET # BLD AUTO: 38 K/UL (ref 135–420)
PLATELET COMMENTS,PCOM: ABNORMAL
PMV BLD AUTO: 9.4 FL (ref 9.2–11.8)
PO2 BLD: 112 MMHG (ref 80–100)
PO2 BLD: 142 MMHG (ref 80–100)
PO2 BLD: 41 MMHG (ref 80–100)
POTASSIUM SERPL-SCNC: 4.4 MMOL/L (ref 3.5–5.5)
PROT SERPL-MCNC: 6.1 G/DL (ref 6.4–8.2)
PROTHROMBIN TIME: 17.4 SEC (ref 11.5–15.2)
Q-T INTERVAL, ECG07: 506 MS
QRS DURATION, ECG06: 96 MS
QTC CALCULATION (BEZET), ECG08: 457 MS
RBC # BLD AUTO: 3.62 M/UL (ref 4.2–5.3)
RBC MORPH BLD: ABNORMAL
SAO2 % BLD: 68 % (ref 92–97)
SAO2 % BLD: 97 % (ref 92–97)
SAO2 % BLD: 99 % (ref 92–97)
SERVICE CMNT-IMP: ABNORMAL
SODIUM SERPL-SCNC: 143 MMOL/L (ref 136–145)
SPECIMEN TYPE: ABNORMAL
SPONTANEOUS TIMED, IST: YES
TIBC SERPL-MCNC: 308 UG/DL (ref 250–450)
TOTAL CELLS COUNTED SPEC: 50
TOTAL RESP. RATE, ITRR: 18
TOTAL RESP. RATE, ITRR: 18
TOTAL RESP. RATE, ITRR: 21
TROPONIN I SERPL-MCNC: 0.37 NG/ML (ref 0–0.04)
TSH SERPL DL<=0.05 MIU/L-ACNC: 2.38 UIU/ML (ref 0.36–3.74)
VENTRICULAR RATE, ECG03: 49 BPM
VIT B12 SERPL-MCNC: 825 PG/ML (ref 211–911)
WBC # BLD AUTO: 1.4 K/UL (ref 4.6–13.2)

## 2020-09-12 PROCEDURE — 82728 ASSAY OF FERRITIN: CPT

## 2020-09-12 PROCEDURE — 82747 ASSAY OF FOLIC ACID RBC: CPT

## 2020-09-12 PROCEDURE — 93005 ELECTROCARDIOGRAM TRACING: CPT

## 2020-09-12 PROCEDURE — 82803 BLOOD GASES ANY COMBINATION: CPT

## 2020-09-12 PROCEDURE — 74011250637 HC RX REV CODE- 250/637: Performed by: INTERNAL MEDICINE

## 2020-09-12 PROCEDURE — 80053 COMPREHEN METABOLIC PANEL: CPT

## 2020-09-12 PROCEDURE — 85610 PROTHROMBIN TIME: CPT

## 2020-09-12 PROCEDURE — 83010 ASSAY OF HAPTOGLOBIN QUANT: CPT

## 2020-09-12 PROCEDURE — 74011000250 HC RX REV CODE- 250: Performed by: INTERNAL MEDICINE

## 2020-09-12 PROCEDURE — 82330 ASSAY OF CALCIUM: CPT

## 2020-09-12 PROCEDURE — 84443 ASSAY THYROID STIM HORMONE: CPT

## 2020-09-12 PROCEDURE — 82962 GLUCOSE BLOOD TEST: CPT

## 2020-09-12 PROCEDURE — 71045 X-RAY EXAM CHEST 1 VIEW: CPT

## 2020-09-12 PROCEDURE — 74011250636 HC RX REV CODE- 250/636: Performed by: HOSPITALIST

## 2020-09-12 PROCEDURE — C9113 INJ PANTOPRAZOLE SODIUM, VIA: HCPCS | Performed by: HOSPITALIST

## 2020-09-12 PROCEDURE — 70450 CT HEAD/BRAIN W/O DYE: CPT

## 2020-09-12 PROCEDURE — 36600 WITHDRAWAL OF ARTERIAL BLOOD: CPT

## 2020-09-12 PROCEDURE — 36415 COLL VENOUS BLD VENIPUNCTURE: CPT

## 2020-09-12 PROCEDURE — 85025 COMPLETE CBC W/AUTO DIFF WBC: CPT

## 2020-09-12 PROCEDURE — 76700 US EXAM ABDOM COMPLETE: CPT

## 2020-09-12 PROCEDURE — 84100 ASSAY OF PHOSPHORUS: CPT

## 2020-09-12 PROCEDURE — 94640 AIRWAY INHALATION TREATMENT: CPT

## 2020-09-12 PROCEDURE — 74011250636 HC RX REV CODE- 250/636: Performed by: FAMILY MEDICINE

## 2020-09-12 PROCEDURE — 83540 ASSAY OF IRON: CPT

## 2020-09-12 PROCEDURE — 82550 ASSAY OF CK (CPK): CPT

## 2020-09-12 PROCEDURE — 82607 VITAMIN B-12: CPT

## 2020-09-12 PROCEDURE — 87389 HIV-1 AG W/HIV-1&-2 AB AG IA: CPT

## 2020-09-12 PROCEDURE — 83735 ASSAY OF MAGNESIUM: CPT

## 2020-09-12 PROCEDURE — 85384 FIBRINOGEN ACTIVITY: CPT

## 2020-09-12 PROCEDURE — 74011000258 HC RX REV CODE- 258: Performed by: INTERNAL MEDICINE

## 2020-09-12 PROCEDURE — 74011250636 HC RX REV CODE- 250/636: Performed by: INTERNAL MEDICINE

## 2020-09-12 PROCEDURE — 86803 HEPATITIS C AB TEST: CPT

## 2020-09-12 PROCEDURE — 83880 ASSAY OF NATRIURETIC PEPTIDE: CPT

## 2020-09-12 PROCEDURE — 74011000250 HC RX REV CODE- 250: Performed by: HOSPITALIST

## 2020-09-12 PROCEDURE — 93970 EXTREMITY STUDY: CPT

## 2020-09-12 PROCEDURE — 83615 LACTATE (LD) (LDH) ENZYME: CPT

## 2020-09-12 PROCEDURE — 65610000006 HC RM INTENSIVE CARE

## 2020-09-12 PROCEDURE — 94660 CPAP INITIATION&MGMT: CPT

## 2020-09-12 PROCEDURE — 77010033678 HC OXYGEN DAILY

## 2020-09-12 RX ORDER — LORAZEPAM 2 MG/ML
1 INJECTION INTRAMUSCULAR
Status: DISCONTINUED | OUTPATIENT
Start: 2020-09-12 | End: 2020-09-13

## 2020-09-12 RX ADMIN — LORAZEPAM 1 MG: 2 INJECTION INTRAMUSCULAR; INTRAVENOUS at 15:57

## 2020-09-12 RX ADMIN — LORAZEPAM 1 MG: 2 INJECTION INTRAMUSCULAR; INTRAVENOUS at 13:37

## 2020-09-12 RX ADMIN — METHYLPREDNISOLONE SODIUM SUCCINATE 40 MG: 40 INJECTION, POWDER, FOR SOLUTION INTRAMUSCULAR; INTRAVENOUS at 09:34

## 2020-09-12 RX ADMIN — SODIUM CHLORIDE 40 MG: 9 INJECTION, SOLUTION INTRAMUSCULAR; INTRAVENOUS; SUBCUTANEOUS at 21:35

## 2020-09-12 RX ADMIN — IPRATROPIUM BROMIDE AND ALBUTEROL SULFATE 3 ML: .5; 3 SOLUTION RESPIRATORY (INHALATION) at 07:58

## 2020-09-12 RX ADMIN — Medication 10 ML: at 13:40

## 2020-09-12 RX ADMIN — DEXMEDETOMIDINE HYDROCHLORIDE 0.2 MCG/KG/HR: 100 INJECTION, SOLUTION INTRAVENOUS at 00:09

## 2020-09-12 RX ADMIN — ARFORMOTEROL TARTRATE 15 MCG: 15 SOLUTION RESPIRATORY (INHALATION) at 07:58

## 2020-09-12 RX ADMIN — BUDESONIDE 500 MCG: 0.5 INHALANT RESPIRATORY (INHALATION) at 21:30

## 2020-09-12 RX ADMIN — Medication 10 ML: at 21:35

## 2020-09-12 RX ADMIN — IPRATROPIUM BROMIDE AND ALBUTEROL SULFATE 3 ML: .5; 3 SOLUTION RESPIRATORY (INHALATION) at 21:30

## 2020-09-12 RX ADMIN — CLINDAMYCIN PHOSPHATE 600 MG: 600 INJECTION, SOLUTION INTRAVENOUS at 09:34

## 2020-09-12 RX ADMIN — ARFORMOTEROL TARTRATE 15 MCG: 15 SOLUTION RESPIRATORY (INHALATION) at 21:30

## 2020-09-12 RX ADMIN — DEXMEDETOMIDINE HYDROCHLORIDE 0.2 MCG/KG/HR: 100 INJECTION, SOLUTION INTRAVENOUS at 06:24

## 2020-09-12 RX ADMIN — CLINDAMYCIN PHOSPHATE 600 MG: 600 INJECTION, SOLUTION INTRAVENOUS at 05:30

## 2020-09-12 RX ADMIN — LORAZEPAM 1 MG: 2 INJECTION, SOLUTION INTRAMUSCULAR; INTRAVENOUS at 23:11

## 2020-09-12 RX ADMIN — METHYLPREDNISOLONE SODIUM SUCCINATE 40 MG: 40 INJECTION, POWDER, FOR SOLUTION INTRAMUSCULAR; INTRAVENOUS at 21:35

## 2020-09-12 RX ADMIN — CLINDAMYCIN PHOSPHATE 600 MG: 600 INJECTION, SOLUTION INTRAVENOUS at 19:37

## 2020-09-12 RX ADMIN — BUDESONIDE 500 MCG: 0.5 INHALANT RESPIRATORY (INHALATION) at 07:59

## 2020-09-12 RX ADMIN — Medication 10 ML: at 05:33

## 2020-09-12 RX ADMIN — ONDANSETRON 4 MG: 2 INJECTION INTRAMUSCULAR; INTRAVENOUS at 03:13

## 2020-09-12 RX ADMIN — IPRATROPIUM BROMIDE AND ALBUTEROL SULFATE 3 ML: .5; 3 SOLUTION RESPIRATORY (INHALATION) at 14:29

## 2020-09-12 RX ADMIN — DEXTROSE MONOHYDRATE AND SODIUM CHLORIDE 25 ML/HR: 5; .9 INJECTION, SOLUTION INTRAVENOUS at 21:35

## 2020-09-12 RX ADMIN — METHYLPREDNISOLONE SODIUM SUCCINATE 40 MG: 40 INJECTION, POWDER, FOR SOLUTION INTRAMUSCULAR; INTRAVENOUS at 13:37

## 2020-09-12 NOTE — PROGRESS NOTES
1900 Bedside and Verbal shift change report given to Cari Persaud (oncoming nurse) by Ronald Jackson (offgoing nurse). Report included the following information SBAR, Kardex, ED Summary, Intake/Output, MAR, Recent Results and Cardiac Rhythm SB 30's. 1927 Pt HR dropped down to 29. Stays around 30-32 which is much lower than he HR the previous night that I had her. Amira HARPER made aware. EKG and stat repeat head CT ordered     2004 EKG sent to Annalisa Oates MD showing marked sinus bradycardia. MD suspects CNS vs cardiac cause of Sinus bradycardia.  Awaiting CT to call when ready for pt    2056 Pt STEVE to CT of head    2117 Pt back in room from CT

## 2020-09-12 NOTE — PROGRESS NOTES
[facility]  Pulmonary, Critical Care, and Sleep Medicine    Name: Elizabeth Rock MRN: [id number]   : 1950 Hospital: [facility] [place]   Date: 2020        [facility] Initial Patient Consult                                              Consult requesting physician: Boo Hurt MD    Reason for Consult: overdose methadone, abnormal ECG, change in mental status    [x] I have reviewed the flowsheet and previous days notes. Events, vitals, medications and notes from last 24 hours reviewed. Care plan discussed with staff, patient, family and on multidisciplinary rounds. Subjective:   2020     Patient Active Problem List   Diagnosis Code    Hypercarbia R06.89    Hypoxia R09.02    Methadone overdose ([facility] [address] T40.3X1A    LIONEL (acute kidney injury) ([facility] [address] N17.9    Prolonged Q-T interval on ECG R94.31    Bradycardia R00.1    Encephalopathy acute G93.40    Thrombocytopenia ([facility]) D69.6    Abnormal echocardiogram R93.1    Acute on chronic respiratory failure with hypoxia and hypercapnia ([facility]) J96.21, J96.22    Pancytopenia ([facility]) D61.818        IMPRESSION:   · Hypercarbic hypoxemic respiratory failure rated methadone overdose and baseline copd   · Patient has a history of asthma/copd   · History of asthma start bronchodilators. · Overdose of methadone. · Abnormal EKG QTC post methadone overdose   · abnormal echo ? Tr ? Issues chronic  vs vegetation ?less likely   · Encephalopathy  · Suicidal attempt  · Depression  · Previous suicidal attempts. · Chronic  thrombocytopenia now pancytopenia hematology consulation  (  said large spleen chronic  issues) HIV pending         · Code status: full      RECOMMENDATIONS:   · CVS: follow QTc, trop echo ? vegetation less likley spoke to cardiology . Less likley previous IVDA  ·  Bradycardia related to overdose of methadone, given Narcan now improved. · Ativan and Precedex as needed.   · Monitor EKG, electrolytes, EKG, troponins, echo  · Respiratory: has COPD/overdose methadone acute on chronic  hypercarbia hypoxemia  · Now on max BIPAP is fails intubated family aware   · BIPAP PRN  · Bronchodilators , steroids abx   · Prn narcan now off drip  ·  Mild hypercarbia hypoxemia related to overdose of methadone improving now follow ABG, start bronchodilators patient he says history of asthma  · ID: [email or web address]. Possible aspiration on clinNo cough or fever or reported an illness recently follow white blood cells  · Hematology/Oncology:pancytopenia, history of thrombocytopenia as per  enlarged spleen . platelets   Very low. I send HIV test consulted hematology maybe Arixtra for DVT ? Prophylaxis. PVL today  History of anemia follow-up hemoglobin  ·   · Renal: Follow CPK renal function  · GI/: DVT GI prophylaxis  · Endocrine: Low glucose sliding scale insulin  · Neurology : CT of the head negative, now awake but in acute withdrawal  on prece dex drip. Follow qtc as of now not able to use neuroleptics at this point. I spoke to psychiatry can resume low dose methadone once clinically better. · Pain/Sedation: Avoid opiates if in severe pain consider low-dose Percocet  · Musculoskeletal: Non  · Prophylaxis: GI Prophylaxis with PI DVT Prophylaxis with Lovenox  · Disposition: To remain ICU for 24 hours  ·                                                                      OTHER:   · Glycemic Control. Glucose stabilizer per ICU protocol when on insulin drip. Maintain blood glucose 140-180. · Replace electrolytes per ICU electrolyte replacement protocol  ·   · HOB >=30 degree elevation all the time. Aggressive pulmonary toileting. Incentive spirometry when appropriate. Aspiration precautions. · Sepsis bundle and protocol followed. Follow serial lactic acid when >2, frequent BMP check, fluid resuscitation. Draw cultures before antibiotic. Follow cultures. Antibiotic choice.  Administer antibiotic within 1 hr ICU admission and 3 hours of ED arrival. Deescalate antibiotic when appropriate. · Vasopressor when appropriate with MAP goal >65 mmHg. · Central Line bundle followed, remove when not needed. Large bore IV line or CVP when appropriate. · Epstein bundle followed, remove epstein catheter when not critically ill. · Skin & Wound care. · Weekly prealbumin, nutritional consult. · PT/OT eval and treat. OOB/IS when appropriate. · Palliative care consult when appropriate. · Further recommendations will be based on the patient's response to recommended treatment and results of the investigation ordered. Quality Care: Stress ulcer prophylaxis, DVT prophylaxis, HOB elevated, Infection control all reviewed and addressed. Events and notes from last 24 hours reviewed. Care plan discussed with nursing, MDR  D/w patient above medical problems, labs, radiologic w/u, prognosis, code status, medication/procedure side effects/complications etc discussed, and answered all questions to their satisfaction. See my orders for detail. CC TIME: 55 minutes of critical care time min      · Prior/Old records reviewed and discussed with patient. · Labs, Images and available PFT and sleep study discussed with patient. Labs and images personally seen and available reports reviewed with patient. · All current medicines are reviewed and doses and prescription adjusted. · Further management depending on test results and work up as outlined above. The patient is: [x] acutely ill Risk of deterioration: [] moderate    [x] critically ill  [x] high       History of Present Illness:     Jarvis Olivera has been seen and evaluated in ICU Patient is a 72 y.o. female with PMHx significant for heroin abuse currently on methadone, history of severe depression, history of previous suicidal attempt last psychiatric hospitalization 2018 in [facility], history of smoking and asthma, diabetes, hypertension.   Patient was admitted to intensive care unit for observation and Narcan drip and abnormal EKG after she was found to be poorly responsive and overdose on methadone. EMS brought patient with change in mental status and after 1 dose of Narcan she started to slightly improve she was eventually started on a Narcan drip. She was found to have a bradycardia and prolonged QTC. CT of the head was normal.  Blood pressure stable. Patient eventually today in intensive care unit work-up. Follow command. And started to go through opiate withdrawal.  So methadone was stopped. She was given low dose of Ativan and low-dose of Precedex. Monitoring closely blood pressure EKG and electrolytes. We have consulted psychiatry. Possible another suicidal attempt. Patient needs to have a sitter. Psychiatry is advised to treat withdrawal and wait with resume doing her methadone at this point. Mild hypoxemia but no cough and fever no cough with contacts. GERD with known asthma on bronchodilators at home      9/12/2020. Patient was going through very acute withdrawal yesterday very difficult to control agitation. Had to be restrained. Was taking off her BiPAP. Currently on Precedex is tolerating BiPAP but still has hypercarbia and hypoxemia. Tolerating currently BiPAP with upgraded to to a verbs. Good tidal volume oxygenation stable patient is awake unfortunately not able to wean off Precedex. Trying to avoid high doses of Ativan due to respiratory issues. Patient has a chronic COPD. Chronic thrombocytopenia now pancytopenia I sent HIV test I consulted hematology check PVL. Fortunately not able to give heparin. However may be we can consider low-dose Arixtra for DVT prophylaxis?'s waiting for hematology input. Very. No cough patient is antibiotics for aspiration pneumonia    9/11/2020  patient remains in ICU on BIPAP  Blood culure  cardiology evaluating  echo  ABG follow up  Copd tx       The patient is critically ill and can not provide additional history due to Unable to comprehend.    History taken from ed//chart    Review of Systems:  A comprehensive review of systems was negative except for that written in the HPI. Medication Review:  · Pressors - none  · Sedation - ativan prn , precedex prn  · Antibiotics - none  · Pain - prn tylenol avoid opiates   · GI/ DVT - dvt/gi prophylaxis   · Others (other gtts) - ivf    Safety Bundles: VAP Bundle/ CAUTI/ Severe Sepsis Protocol/ Electrolyte Replacement Protocol      Allergies   Allergen Reactions    Erythromycin Hives    Penicillins Hives    Tetracycline Hives      Past Medical History:   Diagnosis Date    Chronic back pain     Diabetes ([facility] [address]     Drug abuse ([facility] [address]     Hepatic cirrhosis (HCC)     Hepatitis C     Heroin abuse ([facility] [address]     Pain management     Restless leg syndrome     Sciatica     Spleen enlarged     Thyroid disease       Past Surgical History:   Procedure Laterality Date    HX CHOLECYSTECTOMY      HX GYN      hysterectomy    HX HEENT      T&A    HX ORTHOPAEDIC      Bunion, left hand and right arm abscess removal      Social History     Tobacco Use    Smoking status: Current Every Day Smoker    Smokeless tobacco: Never Used   Substance Use Topics    Alcohol use: No      History reviewed. No pertinent family history. Prior to Admission medications    Medication Sig Start Date End Date Taking? Authorizing Provider   prazosin (MINIPRESS) 2 mg capsule Take 2 mg by mouth nightly. Yes Provider, Historical   vit A/vit C/vit E/zinc/copper (PRESERVISION AREDS PO) Take 1 Cap by mouth two (2) times a day. Yes Provider, Historical   rifAXIMin (XIFAXAN) 550 mg tablet Take 550 mg by mouth two (2) times a day. Yes Provider, Historical   zinc 50 mg tab tablet Take 50 mg by mouth daily. Yes Provider, Historical   lurasidone (Latuda) 40 mg tab tablet Take 40 mg by mouth daily (with dinner). Yes Provider, Historical   pregabalin (Lyrica) 75 mg capsule Take 75 mg by mouth two (2) times a day.    Yes Provider, Historical   escitalopram oxalate (LEXAPRO) 20 mg tablet Take 10 mg by mouth daily. Yes Provider, Historical   methadone (DOLOPHINE) 10 mg/mL solution Take 65 mg by mouth daily. Yes Provider, Historical   hydrOXYzine (VISTARIL) 25 mg capsule Take 50 mg by mouth two (2) times daily as needed for Itching or Anxiety. Blu, MD Boo   levothyroxine (SYNTHROID) 50 mcg tablet Take 50 mcg by mouth Daily (before breakfast). Boo Hurt MD   rOPINIRole (REQUIP) 0.25 mg tablet Take 2 mg by mouth nightly as needed. Indications: restless legs syndrome, an extreme discomfort in the calf muscles when sitting or lying down    Boo Hurt MD   spironolactone (ALDACTONE) 25 mg tablet Take  by mouth daily. Boo Hurt MD   traZODone (DESYREL) 100 mg tablet Take 200 mg by mouth nightly. Boo Hurt MD     Current Facility-Administered Medications   Medication Dose Route Frequency    methylPREDNISolone (PF) (SOLU-MEDROL) injection 40 mg  40 mg IntraVENous Q8H    clindamycin (CLEOCIN) 600mg NS 50 mL IVPB (premix)  600 mg IntraVENous Q8H    dextrose 5% and 0.9% NaCl infusion  25 mL/hr IntraVENous CONTINUOUS    dexmedeTOMidine (PRECEDEX) 400 mcg in 0.9% sodium chloride 100 mL infusion  0.2-0.7 mcg/kg/hr IntraVENous TITRATE    nicotine (NICODERM CQ) 14 mg/24 hr patch 1 Patch  1 Patch TransDERmal DAILY    budesonide (PULMICORT) 500 mcg/2 ml nebulizer suspension  500 mcg Nebulization BID RT    albuterol-ipratropium (DUO-NEB) 2.5 MG-0.5 MG/3 ML  3 mL Nebulization TID RT    arformoteroL (BROVANA) neb solution 15 mcg  15 mcg Nebulization BID RT    sodium chloride (NS) flush 5-40 mL  5-40 mL IntraVENous Q8H    insulin lispro (HUMALOG) injection   SubCUTAneous Q6H    pantoprazole (PROTONIX) 40 mg in 0.9% sodium chloride 10 mL injection  40 mg IntraVENous Q24H       Lines: All central lines examined by me. No signs of erythema, induration, discharge.    Feeding Tube:                        Sheath:                          Infusion Wire/Catheter: Peripherally Inserted Central Catheter:     Central Venous Catheter:     Venous Access Device:     Peripheral Intravenous Line:  Peripheral IV 20 Left Arm (Active)   Site Assessment Clean, dry, & intact 09/10/20 0400   Phlebitis Assessment 0 09/10/20 0400   Infiltration Assessment 0 09/10/20 0400   Dressing Status Clean, dry, & intact 09/10/20 0400   Dressing Type Transparent 09/10/20 0400   Hub Color/Line Status Blue 09/10/20 0400   Action Taken Blood drawn 20 1803   Alcohol Cap Used Yes 09/10/20 0400       Peripheral IV 20 Right Forearm (Active)   Site Assessment Clean, dry, & intact 09/10/20 0400   Phlebitis Assessment 0 09/10/20 0400   Infiltration Assessment 0 09/10/20 0400   Dressing Status Clean, dry, & intact 09/10/20 0400   Dressing Type Transparent 09/10/20 0400   Hub Color/Line Status Blue; Infusing 09/10/20 0400   Alcohol Cap Used Yes 09/10/20 0400                           Telemetry:normal sinus rhythm    Objective:   Vital Signs:    Visit Vitals  BP (!) 141/55   Pulse (!) 39   Temp 97.7 °F (36.5 °C)   Resp 18   Ht 5' 2\" (1.575 m)   Wt 85.4 kg (188 lb 4.4 oz)   SpO2 98% Comment: Simultaneous filing. User may not have seen previous data.    BMI 34.44 kg/m²       O2 Device: BIPAP   O2 Flow Rate (L/min): 4 l/min   Temp (24hrs), Av.1 °F (36.7 °C), Min:97.7 °F (36.5 °C), Max:98.6 °F (37 °C)       Intake/Output:   Last shift:      701 - 1900  In: 29.2 [I.V.:29.2]  Out: -     Last 3 shifts: 09/10 1901 -  07  In: 2224.5 [I.V.:2224.5]  Out: 450 [Urine:450]      Intake/Output Summary (Last 24 hours) at 2020 0922  Last data filed at 2020 0800  Gross per 24 hour   Intake 2253.73 ml   Output 335 ml   Net 1918.73 ml       Last 3 Recorded Weights in this Encounter    09/10/20 0123 09/10/20 1451 20   Weight: 84.5 kg (186 lb 4.6 oz) 84.4 kg (186 lb) 85.4 kg (188 lb 4.4 oz)       Ventilator Settings:  Mode Rate Tidal Volume Pressure FiO2 PEEP            40 % Peak airway pressure:      Plateau pressure:     Tidal volume:    Minute ventilation: 9.2 l/min   SPO2      ARDS network Guidelines: Lung protective strategy and Plateau pressure goals less than or equal to 30      Physical Exam:     General/Neurology: Alert, Awake, moderate distress agitated  Head:   Normocephalic, without obvious abnormality, atraumatic. Eye:   PERRL EOM intact no scleral icterus, no pallor, no cyanosis  Nose:   No sinus tenderness, no erythema, no induration, no discharge  Throat:  Lips, mucosa, and tongue normal. Teeth and gums normal. No tonsillar enlargement, no erythema, no exudates. No oral thrush  Neck:   Supple, symmetric. Thyroid: no enlargement/tenderness/nodule. No carotid bruit. No lymphadenopathy. Trachea midline  Back & spine: Symmetric, no curvature. Chest wall: No tenderness or deformity. No rash  Lung: Moderate air entry bilateral equal. No rales. No rhonchi. No wheezing. No stridors. No prolongdx expiration. No accessory muscle use. No dullness on percussion. Heart:   Regular rate & rhythm. S1 S2 present. No murmur. No gallop. No click. No rub. No JVD. Abdomen/: Soft. NT. ND. +BS. No masses. No organomegaly. Extremities:  No pedal edema. No cyanosis. No clubbing  Pulses: 2+ and symmetric in DP  Lymphatic:  No cervical, supraclavicular or axillary palpable lymphadenopathy. Musculoskeletal: No joint swelling. No tenderness.   Skin:   Color, texture, turgor normal. No rashes or lesions        Data:       Recent Results (from the past 24 hour(s))   CULTURE, BLOOD    Collection Time: 09/11/20  9:55 AM    Specimen: Blood   Result Value Ref Range    Special Requests: NO SPECIAL REQUESTS      Culture result: NO GROWTH AFTER 20 HOURS     CULTURE, BLOOD    Collection Time: 09/11/20 10:10 AM    Specimen: Blood   Result Value Ref Range    Special Requests: NO SPECIAL REQUESTS      Culture result: NO GROWTH AFTER 20 HOURS     GLUCOSE, POC    Collection Time: 09/11/20 12:38 PM Result Value Ref Range    Glucose (POC) 76 70 - 110 mg/dL   POC G3    Collection Time: 09/11/20  4:02 PM   Result Value Ref Range    Device: NASAL CANNULA      Flow rate (POC) 5 L/M    FIO2 (POC) 40 %    pH (POC) 7.27 (L) 7.35 - 7.45      pCO2 (POC) 52.2 (H) 35.0 - 45.0 MMHG    pO2 (POC) 62 (L) 80 - 100 MMHG    HCO3 (POC) 23.7 22 - 26 MMOL/L    sO2 (POC) 87 (L) 92 - 97 %    Base deficit (POC) 3 mmol/L    Allens test (POC) YES      Site LEFT RADIAL      Specimen type (POC) ARTERIAL      Performed by Suzette Sepulveda    GLUCOSE, POC    Collection Time: 09/11/20  6:01 PM   Result Value Ref Range    Glucose (POC) 122 (H) 70 - 110 mg/dL   POC G3    Collection Time: 09/11/20  8:10 PM   Result Value Ref Range    Device: BIPAP      FIO2 (POC) 0.65 %    pH (POC) 7.28 (L) 7.35 - 7.45      pCO2 (POC) 57.4 (H) 35.0 - 45.0 MMHG    pO2 (POC) 212 (H) 80 - 100 MMHG    HCO3 (POC) 26.9 (H) 22 - 26 MMOL/L    sO2 (POC) 100 (H) 92 - 97 %    Base excess (POC) 0 mmol/L    PEEP/CPAP (POC) 72 cmH2O    PIP (POC) 22      Allens test (POC) YES      Total resp. rate 1      Site LEFT RADIAL      Patient temp.  98.6      Specimen type (POC) ARTERIAL      Performed by Flor MCDERMOTT YES     GLUCOSE, POC    Collection Time: 09/11/20 11:54 PM   Result Value Ref Range    Glucose (POC) 130 (H) 70 - 110 mg/dL   MAGNESIUM    Collection Time: 09/12/20  4:41 AM   Result Value Ref Range    Magnesium 2.0 1.6 - 2.6 mg/dL   CALCIUM, IONIZED    Collection Time: 09/12/20  4:41 AM   Result Value Ref Range    Ionized Calcium 1.25 1.12 - 1.32 MMOL/L   PHOSPHORUS    Collection Time: 09/12/20  4:41 AM   Result Value Ref Range    Phosphorus 2.5 2.5 - 4.9 MG/DL   CBC WITH AUTOMATED DIFF    Collection Time: 09/12/20  4:41 AM   Result Value Ref Range    WBC 1.4 (L) 4.6 - 13.2 K/uL    RBC 3.62 (L) 4.20 - 5.30 M/uL    HGB 9.8 (L) 12.0 - 16.0 g/dL    HCT 30.7 (L) 35.0 - 45.0 %    MCV 84.8 74.0 - 97.0 FL    MCH 27.1 24.0 - 34.0 PG    MCHC 31.9 31.0 - 37.0 g/dL    RDW 15.4 (H) 11.6 - 14.5 %    PLATELET 38 (L) 719 - 420 K/uL    MPV 9.4 9.2 - 11.8 FL    NEUTROPHILS 62 42 - 75 %    BAND NEUTROPHILS 2 0 - 5 %    LYMPHOCYTES 28 20 - 51 %    MONOCYTES 8 2 - 9 %    EOSINOPHILS 0 0 - 5 %    BASOPHILS 0 0 - 3 %    TOTAL CELLS COUNTED 50      ABS. NEUTROPHILS 0.9 (L) 1.8 - 8.0 K/UL    ABS. LYMPHOCYTES 0.4 (L) 0.8 - 3.5 K/UL    ABS. MONOCYTES 0.1 0 - 1.0 K/UL    ABS. EOSINOPHILS 0.0 0.0 - 0.4 K/UL    ABS. BASOPHILS 0.0 0.0 - 0.1 K/UL    PLATELET COMMENTS DECREASED PLATELETS      RBC COMMENTS ANISOCYTOSIS  SLIGHT        DF MANUAL     METABOLIC PANEL, COMPREHENSIVE    Collection Time: 09/12/20  4:41 AM   Result Value Ref Range    Sodium 143 136 - 145 mmol/L    Potassium 4.4 3.5 - 5.5 mmol/L    Chloride 111 100 - 111 mmol/L    CO2 27 21 - 32 mmol/L    Anion gap 5 3.0 - 18 mmol/L    Glucose 110 (H) 74 - 99 mg/dL    BUN 24 (H) 7.0 - 18 MG/DL    Creatinine 0.82 0.6 - 1.3 MG/DL    BUN/Creatinine ratio 29 (H) 12 - 20      GFR est AA >60 >60 ml/min/1.73m2    GFR est non-AA >60 >60 ml/min/1.73m2    Calcium 9.1 8.5 - 10.1 MG/DL    Bilirubin, total 0.9 0.2 - 1.0 MG/DL    ALT (SGPT) 43 13 - 56 U/L    AST (SGOT) 129 (H) 10 - 38 U/L    Alk. phosphatase 94 45 - 117 U/L    Protein, total 6.1 (L) 6.4 - 8.2 g/dL    Albumin 3.1 (L) 3.4 - 5.0 g/dL    Globulin 3.0 2.0 - 4.0 g/dL    A-G Ratio 1.0 0.8 - 1.7     TSH 3RD GENERATION    Collection Time: 09/12/20  4:41 AM   Result Value Ref Range    TSH 2.38 0.36 - 3.74 uIU/mL   POC G3    Collection Time: 09/12/20  5:01 AM   Result Value Ref Range    Device: BIPAP      FIO2 (POC) 0.40 %    pH (POC) 7.22 (LL) 7.35 - 7.45      pCO2 (POC) 65.2 (H) 35.0 - 45.0 MMHG    pO2 (POC) 112 (H) 80 - 100 MMHG    HCO3 (POC) 26.9 (H) 22 - 26 MMOL/L    sO2 (POC) 97 92 - 97 %    Base deficit (POC) 1 mmol/L    PEEP/CPAP (POC) 10 cmH2O    PIP (POC) 22      Allens test (POC) YES      Total resp. rate 18      Site LEFT RADIAL      Patient temp.  97.7      Specimen type (POC) ARTERIAL      Performed by Anderson MCDERMOTT YES     GLUCOSE, POC    Collection Time: 09/12/20  5:34 AM   Result Value Ref Range    Glucose (POC) 127 (H) 70 - 110 mg/dL         Chemistry Recent Labs     09/12/20  0441 09/11/20  0420 09/10/20  0455   * 65* 86    142 138   K 4.4 4.7 4.3    110 108   CO2 27 28 27   BUN 24* 23* 24*   CREA 0.82 0.87 0.92   CA 9.1 8.8 8.3*   MG 2.0 2.2 2.0   PHOS 2.5 3.0  --    AGAP 5 4 3   BUCR 29* 26* 26*   AP 94 111 111   TP 6.1* 6.7 5.8*   ALB 3.1* 3.3* 2.9*   GLOB 3.0 3.4 2.9   AGRAT 1.0 1.0 1.0        Lactic Acid No results found for: LAC  No results for input(s): LAC in the last 72 hours. Liver Enzymes Protein, total   Date Value Ref Range Status   09/12/2020 6.1 (L) 6.4 - 8.2 g/dL Final     Albumin   Date Value Ref Range Status   09/12/2020 3.1 (L) 3.4 - 5.0 g/dL Final     Globulin   Date Value Ref Range Status   09/12/2020 3.0 2.0 - 4.0 g/dL Final     A-G Ratio   Date Value Ref Range Status   09/12/2020 1.0 0.8 - 1.7   Final     Alk. phosphatase   Date Value Ref Range Status   09/12/2020 94 45 - 117 U/L Final     Recent Labs     09/12/20  0441 09/11/20  0420 09/10/20  0455   TP 6.1* 6.7 5.8*   ALB 3.1* 3.3* 2.9*   GLOB 3.0 3.4 2.9   AGRAT 1.0 1.0 1.0   AP 94 111 111        CBC w/Diff Recent Labs     09/12/20  0441 09/11/20  0420 09/10/20  0455   WBC 1.4* 4.1* 2.3*   RBC 3.62* 4.36 3.93*   HGB 9.8* 11.9* 10.5*   HCT 30.7* 37.5 32.7*   PLT 38* 50* 43*   GRANS 62 60 71   LYMPH 28 25 20*   EOS 0 1 0        Cardiac Enzymes No results found for: CPK, CK, CKMMB, CKMB, RCK3, CKMBT, CKNDX, CKND1, NYASIA, TROPT, TROIQ, JARED, TROPT, TNIPOC, BNP, BNPP     BNP No results found for: BNP, BNPP, XBNPT     Coagulation No results for input(s): PTP, INR, APTT, INREXT, INREXT in the last 72 hours.       Thyroid  Lab Results   Component Value Date/Time    TSH 2.38 09/12/2020 04:41 AM       No results found for: T4     Lipid Panel No results found for: CHOL, CHOLPOCT, CHOLX, [address] [place] [address], [address] [place] [address] Rd, [id number], HDL, HDLP, LDL, LDLC, DLDLP, [id number], VLDLC, VLDL, TGLX, TRIGL, TRIGP, TGLPOCT, CHHD, CHHDX     ABG  Recent Labs     09/12/20  0501 09/11/20 2010 09/11/20  1602   PHI 7.22* 7.28* 7.27*   PCO2I 65.2* 57.4* 52.2*   PO2I 112* 212* 62*   HCO3I 26.9* 26.9* 23.7   FIO2I 0.40 0.65 40        Urinalysis  Lab Results   Component Value Date/Time    Color DARK YELLOW 09/09/2020 04:30 PM    Appearance CLOUDY 09/09/2020 04:30 PM    Specific gravity 1.020 09/09/2020 04:30 PM    pH (UA) 6.0 09/09/2020 04:30 PM    Protein TRACE (A) 09/09/2020 04:30 PM    Glucose Negative 09/09/2020 04:30 PM    Ketone Negative 09/09/2020 04:30 PM    Bilirubin Negative 09/09/2020 04:30 PM    Urobilinogen 1.0 09/09/2020 04:30 PM    Nitrites Negative 09/09/2020 04:30 PM    Leukocyte Esterase Negative 09/09/2020 04:30 PM    Epithelial cells 2+ 09/09/2020 04:30 PM    Bacteria FEW (A) 09/09/2020 04:30 PM    WBC 0 to 3 09/09/2020 04:30 PM    RBC 0 to 2 09/09/2020 04:30 PM        Micro   Recent Labs     09/11/20  1010 09/11/20  0955   CULT NO GROWTH AFTER 20 HOURS NO GROWTH AFTER 20 HOURS     Recent Labs     09/11/20  1010 09/11/20  0955   CULT NO GROWTH AFTER 20 HOURS NO GROWTH AFTER 20 HOURS        EKG  No results found for this or any previous visit. PFT  No flowsheet data found. Other  ASA reactivity:   Pre-albumin:   Ionized Calcium:   NH4:   T3, FT4:  Cortisol:  Urine Osm:  Urine Lytes:   HbA1c:      Ultrasound       LE Doppler       ECHO       CT (Most Recent)  Results from Hospital Encounter encounter on 09/09/20   CT HEAD WO CONT    Narrative EXAM: CT HEAD WO CONT    CLINICAL INDICATION/HISTORY: ams    COMPARISON: None. TECHNIQUE: Axial CT imaging of the head was performed without intravenous  contrast. Sagittal and coronal reconstructions are provided.  One or more dose  reduction techniques were used on this CT: automated exposure control,  adjustment of the mAs and/or kVp according to patient size, and iterative  reconstruction techniques. The specific techniques used on this CT exam have  been documented in the patient's electronic medical record. Digital Imaging and  Communications in Medicine (DICOM) format image data are available to  nonaffiliated external healthcare facilities or entities on a secure, media  free, reciprocally searchable basis with patient authorization for at least a  12-month period after this study      _______________    FINDINGS:    BRAIN AND POSTERIOR FOSSA: The sulci, folia, ventricles and basal cisterns are  within normal limits for the patient's age. There is no intracranial hemorrhage,  mass effect, or midline shift. There are scattered and confluent foci of decreased attenuation in the  periventricular white matter which are nonspecific but most likely sequelae of  chronic microvascular disease. EXTRA-AXIAL SPACES AND MENINGES: There are no abnormal extra-axial fluid  collections. CALVARIUM: Intact. SINUSES: Clear. OTHER: Prior bilateral lens replacements.    _______________      Impression IMPRESSION:    No acute intracranial abnormalities. XR (Most Recent). CXR reviewed by me and compared with previous CXR Results from Hospital Encounter encounter on 09/09/20   XR CHEST PORT    Narrative EXAM: XR CHEST PORT    CLINICAL INDICATION/HISTORY: OD    > Additional: Altered mental status    COMPARISON: Correlation with rib series dated August 23, 2020    TECHNIQUE: Portable chest    _______________    FINDINGS:    SUPPORT DEVICES: None. HEART AND MEDIASTINUM: Normal size and contour. Normal pulmonary vasculature. LUNGS AND PLEURAL SPACES: Thin band of subsegmental atelectasis or scarring at  the right lung base, unchanged. The lungs are otherwise well expanded and clear. No focal consolidation, effusion, or pneumothorax. BONY THORAX AND SOFT TISSUES: No acute osseous abnormality.     _______________      Impression IMPRESSION:    No active cardiopulmonary disease. High complexity decision making was performed during the evaluation of this patient at high risk for decompensation with multiple organ involvement    [x] Above mentioned total time spent on reviewing the case/medical record/data/notes/EMR/patient examination/documentation/coordinating care with nurse/consultants, exclusive of procedures with complex decision making performed and > 50% time spent in face to face evaluation.     Cc 65 minutes   Eleni Brown MD  9/12/2020

## 2020-09-12 NOTE — PROGRESS NOTES
1900 Bedside and Verbal shift change report given to Cari Persaud (oncoming nurse) by Deepti Diaz RN (offgoing nurse). Report included the following information SBAR, Kardex, ED Summary, Intake/Output, MAR, Recent Results and Cardiac Rhythm SB.       0700 Bedside and Verbal shift change report given to [facility] RN (oncoming nurse) by Michelle Shea (offgoing nurse).  Report included the following information SBAR, Kardex, ED Summary, Intake/Output, MAR, Recent Results and Cardiac Rhythm SB.

## 2020-09-12 NOTE — PROGRESS NOTES
Holding OT eval today per Nurse as pt not appropriate at this time. Pt cont to require BiPAP. To be followed by next therapist available on staff. Thank you.

## 2020-09-12 NOTE — PROGRESS NOTES
Cardiology Progress Note        Patient: Toni Wolff        Sex: female          DOA: 9/9/2020  YOB: 1950      Age:  72 y.o.        LOS:  LOS: 3 days    Patient seen and examined, chart reviewed. Assessment/Plan     Patient Active Problem List   Diagnosis Code    Hypercarbia R06.89    Hypoxia R09.02    Methadone overdose ([facility] [address] T40.3X1A    LIONEL (acute kidney injury) ([facility] [address] N17.9    Prolonged Q-T interval on ECG R94.31    Bradycardia R00.1    Encephalopathy acute G93.40    Thrombocytopenia (HCC) D69.6    Abnormal echocardiogram R93.1    Acute on chronic respiratory failure with hypoxia and hypercapnia (HCC) J96.21, J96.22    Pancytopenia (HCC) D61.818        Plan:    Continue management as per hospital medicine  Avoid AV dyan blocking agents                  Subjective:    cc:  Sedated       REVIEW OF SYSTEMS:     Can not obtain     Objective:      Visit Vitals  BP (!) 144/60   Pulse (!) 41   Temp 97.4 °F (36.3 °C)   Resp 14   Ht 5' 2\" (1.575 m)   Wt 85.4 kg (188 lb 4.4 oz)   SpO2 100%   BMI 34.44 kg/m²     Body mass index is 34.44 kg/m². Physical Exam:  General Appearance: Comfortable, not using accessory muscles of respiration. HEENT: CLARA. HEAD: Atraumatic  NECK: No JVD, no thyroidomeglay. CAROTIDS: No bruit  LUNGS: Clear bilaterally. HEART: S1+S2 audible, no murmur, no pericardial rub.     ABD: Non-tender, BS Audible    NEUROLOGICAL: Sedated    Medication:  Current Facility-Administered Medications   Medication Dose Route Frequency    methylPREDNISolone (PF) (SOLU-MEDROL) injection 40 mg  40 mg IntraVENous Q8H    clindamycin (CLEOCIN) 600mg NS 50 mL IVPB (premix)  600 mg IntraVENous Q8H    dextrose 5% and 0.9% NaCl infusion  25 mL/hr IntraVENous CONTINUOUS    ELECTROLYTE REPLACEMENT PROTOCOL - Phosphorus  Standard Dosing  1 Each Other PRN    nicotine (NICODERM CQ) 14 mg/24 hr patch 1 Patch  1 Patch TransDERmal DAILY    budesonide (PULMICORT) 500 mcg/2 ml nebulizer suspension  500 mcg Nebulization BID RT    albuterol-ipratropium (DUO-NEB) 2.5 MG-0.5 MG/3 ML  3 mL Nebulization TID RT    promethazine (PHENERGAN) 12.5 mg in 0.9% sodium chloride 50 mL IVPB  12.5 mg IntraVENous Q6H PRN    LORazepam (ATIVAN) injection 1 mg  1 mg IntraVENous Q6H PRN    arformoteroL (BROVANA) neb solution 15 mcg  15 mcg Nebulization BID RT    sodium chloride (NS) flush 5-40 mL  5-40 mL IntraVENous Q8H    sodium chloride (NS) flush 5-40 mL  5-40 mL IntraVENous PRN    acetaminophen (TYLENOL) tablet 650 mg  650 mg Oral Q6H PRN    Or    acetaminophen (TYLENOL) suppository 650 mg  650 mg Rectal Q6H PRN    bisacodyL (DULCOLAX) suppository 10 mg  10 mg Rectal DAILY PRN    promethazine (PHENERGAN) tablet 12.5 mg  12.5 mg Oral Q6H PRN    Or    ondansetron (ZOFRAN) injection 4 mg  4 mg IntraVENous Q6H PRN    naloxone (NARCAN) injection 0.4 mg  0.4 mg IntraVENous EVERY 2 MINUTES AS NEEDED    insulin lispro (HUMALOG) injection   SubCUTAneous Q6H    glucose chewable tablet 16 g  4 Tab Oral PRN    glucagon (GLUCAGEN) injection 1 mg  1 mg IntraMUSCular PRN    ELECTROLYTE REPLACEMENT PROTOCOL - Potassium Standard Dosing   1 Each Other PRN    ELECTROLYTE REPLACEMENT PROTOCOL - Magnesium   1 Each Other PRN    ELECTROLYTE REPLACEMENT PROTOCOL - Calcium   1 Each Other PRN    cloNIDine HCL (CATAPRES) tablet 0.1 mg  0.1 mg Oral Q4H PRN    pantoprazole (PROTONIX) 40 mg in 0.9% sodium chloride 10 mL injection  40 mg IntraVENous Q24H               Lab/Data Reviewed:       Recent Labs     09/12/20  0441 09/11/20  0420 09/10/20  0455   WBC 1.4* 4.1* 2.3*   HGB 9.8* 11.9* 10.5*   HCT 30.7* 37.5 32.7*   PLT 38* 50* 43*     Recent Labs     09/12/20  0441 09/11/20  0420 09/10/20  0455    142 138   K 4.4 4.7 4.3    110 108   CO2 27 28 27   * 65* 86   BUN 24* 23* 24*   CREA 0.82 0.87 0.92   CA 9.1 8.8 8.3*       Signed By: Brielle Carlisle MD     September 12, 2020

## 2020-09-12 NOTE — PROGRESS NOTES
PLACED PATIENT ON SMALL MASK TO ACHIEVE BETTER SEAL. CHANGED TO BIPAP @ 18/6 WITH FIO2 40%. PLEASE NOTE THAT BLOOD GAS RESULTS AT 1136 WERE VENOUS. DR. Cristina Cervantes.

## 2020-09-12 NOTE — CONSULTS
Phone: [phone]  Paging : [phone]      Hematology / Oncology Initial Consult Note    Admit Date: 9/9/2020    Reason for Consult: pancytopenia    Requesting Physician: Nahomy Steele    Assessment:     Shannon Breaux is a 72 y.o., [address] female, who I have been asked to see for pancytopenia    Principal Problem:    Methadone overdose ([address] (9/9/2020)    Active Problems:    Hypercarbia (9/9/2020)      Hypoxia (9/9/2020)      LIONEL (acute kidney injury) ([address] (9/9/2020)      Prolonged Q-T interval on ECG (9/9/2020)      Bradycardia (9/9/2020)      Encephalopathy acute (9/9/2020)      Thrombocytopenia ([address] (9/10/2020)      Abnormal echocardiogram (9/11/2020)      Acute on chronic respiratory failure with hypoxia and hypercapnia ([address] (9/11/2020)        Plan:     1. Smear is reviewed from this AM. No plt clumping, no schistocytes, no hypochromia. I did not see bandemia or other signs of infection. Decreased WBC. Given alcohol abuse, cirrhosis per chart, and chronic thrombocytopenia in the setting of normal renal function, this is unlikely TTP. Will check DIC panel, but coagulopathy can be present 2nd to cirrhosis. Of note, HIV negative 2011. Pending now. Will check HCV    2. Leukopenia is chronic and will not investigate. 3. Anemia is relatively new. Will check iron, B12 and folate acid given history. Will also check hapto LDH. Will check spleen ultrasound to eval for splenomegaly and document cirrhosis( I do not see any imaging in care everywhere to show cirrhosis, which will explain the hematological findings. )    Doubt any hematological etiology for mental status change. The rest of care per ICU and primary team.     Will follow with you. History of Present Illness: Shannon Breaux is a 72 y.o. female who is now admitted for assumed drug overdose, urine shows methadone and cannabinal. Hematology is consulted for abnormal cbcs.      On admission Hb 10.8, today 9.8. plt 41 today 38. Baseline back in 2015 34. Hb 11.8. WBC on admission 2.2 today 1.4. baseline 1.3 in 2015. Cr 0.92, LFTs with elevated AST only . No protein gap. Labs from 8/29 reviewed in [facility], also wbc 2.5, hb 11.8, plt 46. She is also under the care of cardiology to rule out valve vegetation. She is in ICU on BiPAP. ROS:    Per chart  Not able to review due to BiPAP    Past Medical History:   Diagnosis Date    Chronic back pain     [facility]     Drug abuse [facility]     Hepatic cirrhosis [facility]     Hepatitis C     Heroin abuse [facility]     Pain management     Restless leg syndrome     Sciatica     Spleen enlarged     Thyroid disease        Past Surgical History:   Procedure Laterality Date    HX CHOLECYSTECTOMY      HX GYN      hysterectomy    HX HEENT      T&A    HX ORTHOPAEDIC      Bunion, left hand and right arm abscess removal       History reviewed. No pertinent family history.     Social History     Socioeconomic History    Marital status:      Spouse name: Not on file    Number of children: Not on file    Years of education: Not on file    Highest education level: Not on file   Tobacco Use    Smoking status: Current Every Day Smoker    Smokeless tobacco: Never Used   Substance and Sexual Activity    Alcohol use: No    Drug use: Yes       Current Facility-Administered Medications   Medication Dose Route Frequency    clindamycin (CLEOCIN) 600mg NS 50 mL IVPB (premix)  600 mg IntraVENous Q8H    dextrose 5% and 0.9% NaCl infusion  25 mL/hr IntraVENous CONTINUOUS    ELECTROLYTE REPLACEMENT PROTOCOL - Phosphorus  Standard Dosing  1 Each Other PRN    dexmedeTOMidine (PRECEDEX) 400 mcg in 0.9% sodium chloride 100 mL infusion  0.2-0.7 mcg/kg/hr IntraVENous TITRATE    nicotine (NICODERM CQ) 14 mg/24 hr patch 1 Patch  1 Patch TransDERmal DAILY    budesonide (PULMICORT) 500 mcg/2 ml nebulizer suspension  500 mcg Nebulization BID RT    albuterol-ipratropium (DUO-NEB) 2.5 MG-0.5 MG/3 ML  3 mL Nebulization TID RT    promethazine (PHENERGAN) 12.5 mg in 0.9% sodium chloride 50 mL IVPB  12.5 mg IntraVENous Q6H PRN    LORazepam (ATIVAN) injection 1 mg  1 mg IntraVENous Q6H PRN    arformoteroL (BROVANA) neb solution 15 mcg  15 mcg Nebulization BID RT    sodium chloride (NS) flush 5-40 mL  5-40 mL IntraVENous Q8H    sodium chloride (NS) flush 5-40 mL  5-40 mL IntraVENous PRN    acetaminophen (TYLENOL) tablet 650 mg  650 mg Oral Q6H PRN    Or    acetaminophen (TYLENOL) suppository 650 mg  650 mg Rectal Q6H PRN    bisacodyL (DULCOLAX) suppository 10 mg  10 mg Rectal DAILY PRN    promethazine (PHENERGAN) tablet 12.5 mg  12.5 mg Oral Q6H PRN    Or    ondansetron (ZOFRAN) injection 4 mg  4 mg IntraVENous Q6H PRN    naloxone (NARCAN) injection 0.4 mg  0.4 mg IntraVENous EVERY 2 MINUTES AS NEEDED    insulin lispro (HUMALOG) injection   SubCUTAneous Q6H    glucose chewable tablet 16 g  4 Tab Oral PRN    glucagon (GLUCAGEN) injection 1 mg  1 mg IntraMUSCular PRN    ELECTROLYTE REPLACEMENT PROTOCOL - Potassium Standard Dosing   1 Each Other PRN    ELECTROLYTE REPLACEMENT PROTOCOL - Magnesium   1 Each Other PRN    ELECTROLYTE REPLACEMENT PROTOCOL - Calcium   1 Each Other PRN    cloNIDine HCL (CATAPRES) tablet 0.1 mg  0.1 mg Oral Q4H PRN    pantoprazole (PROTONIX) 40 mg in 0.9% sodium chloride 10 mL injection  40 mg IntraVENous Q24H       Prior to Admission medications    Medication Sig Start Date End Date Taking? Authorizing Provider   prazosin (MINIPRESS) 2 mg capsule Take 2 mg by mouth nightly. Yes Provider, Historical   vit A/vit C/vit E/zinc/copper (PRESERVISION AREDS PO) Take 1 Cap by mouth two (2) times a day. Yes Provider, Historical   rifAXIMin (XIFAXAN) 550 mg tablet Take 550 mg by mouth two (2) times a day. Yes Provider, Historical   zinc 50 mg tab tablet Take 50 mg by mouth daily.    Yes Provider, Historical   lurasidone (Latuda) 40 mg tab tablet Take 40 mg by mouth daily (with dinner). Yes Provider, Historical   pregabalin (Lyrica) 75 mg capsule Take 75 mg by mouth two (2) times a day. Yes Provider, Historical   escitalopram oxalate (LEXAPRO) 20 mg tablet Take 10 mg by mouth daily. Yes Provider, Historical   methadone (DOLOPHINE) 10 mg/mL solution Take 65 mg by mouth daily. Yes Provider, Historical   hydrOXYzine (VISTARIL) 25 mg capsule Take 50 mg by mouth two (2) times daily as needed for Itching or Anxiety. Other, MD Boo   levothyroxine (SYNTHROID) 50 mcg tablet Take 50 mcg by mouth Daily (before breakfast). Boo Hurt MD   rOPINIRole (REQUIP) 0.25 mg tablet Take 2 mg by mouth nightly as needed. Indications: restless legs syndrome, an extreme discomfort in the calf muscles when sitting or lying down    Boo Hurt MD   spironolactone (ALDACTONE) 25 mg tablet Take  by mouth daily. Boo Hurt MD   traZODone (DESYREL) 100 mg tablet Take 200 mg by mouth nightly. Boo Hurt MD       Allergies   Allergen Reactions    Erythromycin Hives    Penicillins Hives    Tetracycline Hives       ECOG PS:    Physical Exam:    Temp (24hrs), Av.2 °F (36.8 °C), Min:97.7 °F (36.5 °C), Max:98.6 °F (37 °C)  VSIP    Intake/Output Summary (Last 24 hours) at 2020 0845  Last data filed at 2020 0700  Gross per 24 hour   Intake 2224.53 ml   Output 335 ml   Net 1889.53 ml   RESULTRCNT(24h)Ct Head Wo Cont    Result Date: 2020  EXAM: CT HEAD WO CONT CLINICAL INDICATION/HISTORY: ams COMPARISON: None. TECHNIQUE: Axial CT imaging of the head was performed without intravenous contrast. Sagittal and coronal reconstructions are provided. One or more dose reduction techniques were used on this CT: automated exposure control, adjustment of the mAs and/or kVp according to patient size, and iterative reconstruction techniques. The specific techniques used on this CT exam have been documented in the patient's electronic medical record.  Digital Imaging and Communications in Medicine (DICOM) format image data are available to nonaffiliated external healthcare facilities or entities on a secure, media free, reciprocally searchable basis with patient authorization for at least a 12-month period after this study _______________ FINDINGS: BRAIN AND POSTERIOR FOSSA: The sulci, folia, ventricles and basal cisterns are within normal limits for the patient's age. There is no intracranial hemorrhage, mass effect, or midline shift. There are scattered and confluent foci of decreased attenuation in the periventricular white matter which are nonspecific but most likely sequelae of chronic microvascular disease. EXTRA-AXIAL SPACES AND MENINGES: There are no abnormal extra-axial fluid collections. CALVARIUM: Intact. SINUSES: Clear. OTHER: Prior bilateral lens replacements. _______________     IMPRESSION: No acute intracranial abnormalities. Xr Chest Port    Result Date: 9/9/2020  EXAM: XR CHEST PORT CLINICAL INDICATION/HISTORY: OD   > Additional: Altered mental status COMPARISON: Correlation with rib series dated August 23, 2020 TECHNIQUE: Portable chest _______________ FINDINGS: SUPPORT DEVICES: None. HEART AND MEDIASTINUM: Normal size and contour. Normal pulmonary vasculature. LUNGS AND PLEURAL SPACES: Thin band of subsegmental atelectasis or scarring at the right lung base, unchanged. The lungs are otherwise well expanded and clear. No focal consolidation, effusion, or pneumothorax. BONY THORAX AND SOFT TISSUES: No acute osseous abnormality. _______________     IMPRESSION: No active cardiopulmonary disease. Xr Ribs Rt W Pa Cxr Min 3 V    Result Date: 8/23/2020  EXAM: Frontal view of the chest and 3 views of the right ribs CLINICAL INDICATION/HISTORY: Right rib pain COMPARISON: Chest radiograph dated 7/20/2011 _______________ FINDINGS: Linear opacity identified at the right lung base. No pleural effusion or pneumothorax identified.  Questionable subtle cortical irregularity involving the lateral eighth and possibly ninth ribs. No other displaced rib fractures identified. _______________     IMPRESSION: 1. Equivocal findings of nondisplaced lateral right eighth and ninth rib fractures. No other rib fractures identified. 2. Linear atelectasis at the right lung base. No pleural effusion or pneumothorax identified.         General: Alert , Oriented, in no distress  HEENT: no pallor, anicteric sclera, oral pharynx without lesion   No cervical, supraclavicular, axillary and inguinal lymphadenopathy palpated  Heart: regular rate, and rhythm, without murmur, gallop or rubbing  Lungs:breathing comfortably on room air, clear to auscultation and percussion bilaterally  ABD: bowel sound present, soft, nondistended, nontender, no hepatosplenomegaly or mass  Extremities: warm, well perfused, no edema  MSK: no tenderness along the spine or long bones  Skin: No rash  Neuro: non-focal      Recent Results (from the past 24 hour(s))   POC G3    Collection Time: 09/11/20  8:46 AM   Result Value Ref Range    Device: NASAL CANNULA      Flow rate (POC) 3 L/M    FIO2 (POC) 32 %    pH (POC) 7.21 (LL) 7.35 - 7.45      pCO2 (POC) 69.7 (H) 35.0 - 45.0 MMHG    pO2 (POC) 60 (L) 80 - 100 MMHG    HCO3 (POC) 27.7 (H) 22 - 26 MMOL/L    sO2 (POC) 84 (L) 92 - 97 %    Base excess (POC) 0 mmol/L    Allens test (POC) YES      Site LEFT RADIAL      Specimen type (POC) ARTERIAL      Performed by Suzette Sepulveda    CULTURE, BLOOD    Collection Time: 09/11/20  9:55 AM    Specimen: Blood   Result Value Ref Range    Special Requests: NO SPECIAL REQUESTS      Culture result: NO GROWTH AFTER 20 HOURS     CULTURE, BLOOD    Collection Time: 09/11/20 10:10 AM    Specimen: Blood   Result Value Ref Range    Special Requests: NO SPECIAL REQUESTS      Culture result: NO GROWTH AFTER 20 HOURS     GLUCOSE, POC    Collection Time: 09/11/20 12:38 PM   Result Value Ref Range    Glucose (POC) 76 70 - 110 mg/dL   POC G3    Collection Time: 09/11/20  4:02 PM   Result Value Ref Range    Device: NASAL CANNULA      Flow rate (POC) 5 L/M    FIO2 (POC) 40 %    pH (POC) 7.27 (L) 7.35 - 7.45      pCO2 (POC) 52.2 (H) 35.0 - 45.0 MMHG    pO2 (POC) 62 (L) 80 - 100 MMHG    HCO3 (POC) 23.7 22 - 26 MMOL/L    sO2 (POC) 87 (L) 92 - 97 %    Base deficit (POC) 3 mmol/L    Allens test (POC) YES      Site LEFT RADIAL      Specimen type (POC) ARTERIAL      Performed by Brenden Davis    GLUCOSE, POC    Collection Time: 09/11/20  6:01 PM   Result Value Ref Range    Glucose (POC) 122 (H) 70 - 110 mg/dL   POC G3    Collection Time: 09/11/20  8:10 PM   Result Value Ref Range    Device: BIPAP      FIO2 (POC) 0.65 %    pH (POC) 7.28 (L) 7.35 - 7.45      pCO2 (POC) 57.4 (H) 35.0 - 45.0 MMHG    pO2 (POC) 212 (H) 80 - 100 MMHG    HCO3 (POC) 26.9 (H) 22 - 26 MMOL/L    sO2 (POC) 100 (H) 92 - 97 %    Base excess (POC) 0 mmol/L    PEEP/CPAP (POC) 72 cmH2O    PIP (POC) 22      Allens test (POC) YES      Total resp. rate 1      Site LEFT RADIAL      Patient temp.  98.6      Specimen type (POC) ARTERIAL      Performed by Carolyn MCDERMOTT YES     GLUCOSE, POC    Collection Time: 09/11/20 11:54 PM   Result Value Ref Range    Glucose (POC) 130 (H) 70 - 110 mg/dL   MAGNESIUM    Collection Time: 09/12/20  4:41 AM   Result Value Ref Range    Magnesium 2.0 1.6 - 2.6 mg/dL   CALCIUM, IONIZED    Collection Time: 09/12/20  4:41 AM   Result Value Ref Range    Ionized Calcium 1.25 1.12 - 1.32 MMOL/L   PHOSPHORUS    Collection Time: 09/12/20  4:41 AM   Result Value Ref Range    Phosphorus 2.5 2.5 - 4.9 MG/DL   CBC WITH AUTOMATED DIFF    Collection Time: 09/12/20  4:41 AM   Result Value Ref Range    WBC 1.4 (L) 4.6 - 13.2 K/uL    RBC 3.62 (L) 4.20 - 5.30 M/uL    HGB 9.8 (L) 12.0 - 16.0 g/dL    HCT 30.7 (L) 35.0 - 45.0 %    MCV 84.8 74.0 - 97.0 FL    MCH 27.1 24.0 - 34.0 PG    MCHC 31.9 31.0 - 37.0 g/dL    RDW 15.4 (H) 11.6 - 14.5 %    PLATELET 38 (L) 670 - 420 K/uL    MPV 9.4 9.2 - 11.8 FL    NEUTROPHILS 62 42 - 75 %    BAND NEUTROPHILS 2 0 - 5 %    LYMPHOCYTES 28 20 - 51 %    MONOCYTES 8 2 - 9 %    EOSINOPHILS 0 0 - 5 %    BASOPHILS 0 0 - 3 %    TOTAL CELLS COUNTED 50      ABS. NEUTROPHILS 0.9 (L) 1.8 - 8.0 K/UL    ABS. LYMPHOCYTES 0.4 (L) 0.8 - 3.5 K/UL    ABS. MONOCYTES 0.1 0 - 1.0 K/UL    ABS. EOSINOPHILS 0.0 0.0 - 0.4 K/UL    ABS. BASOPHILS 0.0 0.0 - 0.1 K/UL    PLATELET COMMENTS DECREASED PLATELETS      RBC COMMENTS ANISOCYTOSIS  SLIGHT        DF MANUAL     METABOLIC PANEL, COMPREHENSIVE    Collection Time: 09/12/20  4:41 AM   Result Value Ref Range    Sodium 143 136 - 145 mmol/L    Potassium 4.4 3.5 - 5.5 mmol/L    Chloride 111 100 - 111 mmol/L    CO2 27 21 - 32 mmol/L    Anion gap 5 3.0 - 18 mmol/L    Glucose 110 (H) 74 - 99 mg/dL    BUN 24 (H) 7.0 - 18 MG/DL    Creatinine 0.82 0.6 - 1.3 MG/DL    BUN/Creatinine ratio 29 (H) 12 - 20      GFR est AA >60 >60 ml/min/1.73m2    GFR est non-AA >60 >60 ml/min/1.73m2    Calcium 9.1 8.5 - 10.1 MG/DL    Bilirubin, total 0.9 0.2 - 1.0 MG/DL    ALT (SGPT) 43 13 - 56 U/L    AST (SGOT) 129 (H) 10 - 38 U/L    Alk. phosphatase 94 45 - 117 U/L    Protein, total 6.1 (L) 6.4 - 8.2 g/dL    Albumin 3.1 (L) 3.4 - 5.0 g/dL    Globulin 3.0 2.0 - 4.0 g/dL    A-G Ratio 1.0 0.8 - 1.7     TSH 3RD GENERATION    Collection Time: 09/12/20  4:41 AM   Result Value Ref Range    TSH 2.38 0.36 - 3.74 uIU/mL   POC G3    Collection Time: 09/12/20  5:01 AM   Result Value Ref Range    Device: BIPAP      FIO2 (POC) 0.40 %    pH (POC) 7.22 (LL) 7.35 - 7.45      pCO2 (POC) 65.2 (H) 35.0 - 45.0 MMHG    pO2 (POC) 112 (H) 80 - 100 MMHG    HCO3 (POC) 26.9 (H) 22 - 26 MMOL/L    sO2 (POC) 97 92 - 97 %    Base deficit (POC) 1 mmol/L    PEEP/CPAP (POC) 10 cmH2O    PIP (POC) 22      Allens test (POC) YES      Total resp. rate 18      Site LEFT RADIAL      Patient temp.  97.7      Specimen type (POC) ARTERIAL      Performed by Zahraa Fajardo YES     GLUCOSE, POC Collection Time: 09/12/20  5:34 AM   Result Value Ref Range    Glucose (POC) 127 (H) 70 - 110 mg/dL   RISRSLTCt Head Wo Cont    Result Date: 9/9/2020  EXAM: CT HEAD WO CONT CLINICAL INDICATION/HISTORY: ams COMPARISON: None. TECHNIQUE: Axial CT imaging of the head was performed without intravenous contrast. Sagittal and coronal reconstructions are provided. One or more dose reduction techniques were used on this CT: automated exposure control, adjustment of the mAs and/or kVp according to patient size, and iterative reconstruction techniques. The specific techniques used on this CT exam have been documented in the patient's electronic medical record. Digital Imaging and Communications in Medicine (DICOM) format image data are available to nonaffiliated external healthcare facilities or entities on a secure, media free, reciprocally searchable basis with patient authorization for at least a 12-month period after this study _______________ FINDINGS: BRAIN AND POSTERIOR FOSSA: The sulci, folia, ventricles and basal cisterns are within normal limits for the patient's age. There is no intracranial hemorrhage, mass effect, or midline shift. There are scattered and confluent foci of decreased attenuation in the periventricular white matter which are nonspecific but most likely sequelae of chronic microvascular disease. EXTRA-AXIAL SPACES AND MENINGES: There are no abnormal extra-axial fluid collections. CALVARIUM: Intact. SINUSES: Clear. OTHER: Prior bilateral lens replacements. _______________     IMPRESSION: No acute intracranial abnormalities. Xr Chest Port    Result Date: 9/9/2020  EXAM: XR CHEST PORT CLINICAL INDICATION/HISTORY: OD   > Additional: Altered mental status COMPARISON: Correlation with rib series dated August 23, 2020 TECHNIQUE: Portable chest _______________ FINDINGS: SUPPORT DEVICES: None. HEART AND MEDIASTINUM: Normal size and contour. Normal pulmonary vasculature.  LUNGS AND PLEURAL SPACES: Thin band of subsegmental atelectasis or scarring at the right lung base, unchanged. The lungs are otherwise well expanded and clear. No focal consolidation, effusion, or pneumothorax. BONY THORAX AND SOFT TISSUES: No acute osseous abnormality. _______________     IMPRESSION: No active cardiopulmonary disease. Xr Ribs Rt W Pa Cxr Min 3 V    Result Date: 8/23/2020  EXAM: Frontal view of the chest and 3 views of the right ribs CLINICAL INDICATION/HISTORY: Right rib pain COMPARISON: Chest radiograph dated 7/20/2011 _______________ FINDINGS: Linear opacity identified at the right lung base. No pleural effusion or pneumothorax identified. Questionable subtle cortical irregularity involving the lateral eighth and possibly ninth ribs. No other displaced rib fractures identified. _______________     IMPRESSION: 1. Equivocal findings of nondisplaced lateral right eighth and ninth rib fractures. No other rib fractures identified. 2. Linear atelectasis at the right lung base. No pleural effusion or pneumothorax identified.         Ko Larsen MD, PhD  Office: [phone]  Cell: [phone]

## 2020-09-12 NOTE — PROGRESS NOTES
Holding PT eval today per Nurse as pt not appropriate at this time. Pt cont to require BiPAP. Will check on pt tomorrow for appropriateness.

## 2020-09-12 NOTE — PROGRESS NOTES
In terms of DVT ppx, I would recommend ruling out GI bleed with an occult heme study first. When ruled out, ok to use heparin 5000 q 8 hrs when plt is over 50k.

## 2020-09-12 NOTE — PROGRESS NOTES
Hospitalist Progress Note-critical care note     Patient: Juan Groves MRN: [id number]  CSN: [id number]    YOB: 1950  Age: 72 y.o.   Sex: female    DOA: 9/9/2020 LOS:  LOS: 3 days            Chief complaint: Methadone overuse hypoxia, prolonged QT bradycardia encephalopathy    Assessment/Plan         Hospital Problems  Never Reviewed          Codes Class Noted POA    Pancytopenia ([facility] ICD-10-CM: D75.074  ICD-9-CM: 284.19  9/12/2020 Yes        Abnormal echocardiogram ICD-10-CM: R93.1  ICD-9-CM: 793.2  9/11/2020 Yes        Acute on chronic respiratory failure with hypoxia and hypercapnia (HCC) ICD-10-CM: J96.21, J96.22  ICD-9-CM: 518.84, 786.09, 799.02  9/11/2020 Yes        Thrombocytopenia ([facility] ICD-10-CM: D69.6  ICD-9-CM: 287.5  9/10/2020 Yes        Hypercarbia ICD-10-CM: R06.89  ICD-9-CM: 786.09  9/9/2020 Yes        Hypoxia ICD-10-CM: R09.02  ICD-9-CM: 799.02  9/9/2020 Yes        * (Principal) Methadone overdose ([facility] ICD-10-CM: T40.3X1A  ICD-9-CM: 965.02, E980.0  9/9/2020 Yes        LIONEL (acute kidney injury) ([facility] ICD-10-CM: N17.9  ICD-9-CM: 584.9  9/9/2020 Yes        Prolonged Q-T interval on ECG ICD-10-CM: R94.31  ICD-9-CM: 794.31  9/9/2020 Yes        Bradycardia ICD-10-CM: R00.1  ICD-9-CM: 427.89  9/9/2020 Yes        Encephalopathy acute ICD-10-CM: G93.40  ICD-9-CM: 348.30  9/9/2020 Yes            Respiration   Acute respiratory failure with  Hypercarbia and hypoxia   Due to drug overdose  On bipap- low thresh hold for intubation   Case discussed with dr. Eddie Hernandez          card:   Prolong Q-T and bradycardia -like due side effect from methadone  Cardiologist on board   Planning ADRIANNA for possible valve vegetation   bcx no growth     Psych     Methadone overdose   Cardiac monitoring ,   Optimize electrolytes   Narcan as needed  Sitter, si precaution     Neuro  Acute encephalopathy  Ct head no acute issue on admission  mri brain ordered-no done      Renal     lionel   Resolved    continue replace electrolytes as needed     Endo :   DM type II   Ssi      Hemo :  Anemia and thrombocytopenia   Hematologist on board     Hx of cirrhosis   Ammonia level was wnl     GI :   Npo for now , ppi     rn : still on bipap and  Needs sedation  Case discussed with dr. Fabiola Kenyon- possible intubate if not improving from bipap     30minutes of critical care time spent in the direct evaluation and treatment of this high risk patient. The reason for providing this level of medical care for this critically ill patient was due a critical illness that impaired one or more vital organ systems such that there was a high probability of imminent or life threatening deterioration in the patients condition. This care involved high complexity decision making to assess, manipulate, and support vital system functions, to treat this degreee vital organ system failure and to prevent further life threatening deterioration of the patients condition. Disposition :tbd,   Review of systems:  Ros limited due to on bipap     Vital signs/Intake and Output:  Visit Vitals  BP (!) 133/57   Pulse (!) 45   Temp 97.4 °F (36.3 °C)   Resp 20   Ht 5' 2\" (1.575 m)   Wt 85.4 kg (188 lb 4.4 oz)   SpO2 96%   BMI 34.44 kg/m²     Current Shift:  09/12 0701 - 09/12 1900  In: 29.2 [I.V.:29.2]  Out: -   Last three shifts:  09/10 1901 - 09/12 0700  In: 2224.5 [I.V.:2224.5]  Out: 450 [Urine:450]    Physical Exam:  General: WD, WN. Open eyes per verbal stimuli   HEENT: NC, Atraumatic. PERRLA, anicteric sclerae. bipap mask noted   Lungs: CTA Bilaterally. No Wheezing/Rhonchi/Rales. Heart:  Hari ,  No murmur, No Rubs, No Gallops  Abdomen: Soft, Non distended, Non tender. +Bowel sounds,   Extremities: No c/c/e  Psych:   Not anxious or agitated.   Neurologic:   Confused and f/u some command           Labs: Results:       Chemistry Recent Labs     09/12/20  0441 09/11/20  0420 09/10/20  0455   * 65* 86    142 138   K 4.4 4.7 4.3    110 108 CO2 27 28 27   BUN 24* 23* 24*   CREA 0.82 0.87 0.92   CA 9.1 8.8 8.3*   AGAP 5 4 3   BUCR 29* 26* 26*   AP 94 111 111   TP 6.1* 6.7 5.8*   ALB 3.1* 3.3* 2.9*   GLOB 3.0 3.4 2.9   AGRAT 1.0 1.0 1.0      CBC w/Diff Recent Labs     09/12/20  0441 09/11/20  0420 09/10/20  0455   WBC 1.4* 4.1* 2.3*   RBC 3.62* 4.36 3.93*   HGB 9.8* 11.9* 10.5*   HCT 30.7* 37.5 32.7*   PLT 38* 50* 43*   GRANS 62 60 71   LYMPH 28 25 20*   EOS 0 1 0      Cardiac Enzymes Recent Labs     09/12/20  0940 09/09/20  1710   CPK 3,755* 398*   CKND1 0.2 1.9      Coagulation Recent Labs     09/12/20  0940   PTP 17.4*   INR 1.5*       Lipid Panel No results found for: CHOL, CHOLPOCT, CHOLX, CHLST, CHOLV, 973946, HDL, HDLP, LDL, LDLC, DLDLP, 067751, VLDLC, VLDL, TGLX, TRIGL, TRIGP, TGLPOCT, CHHD, CHHDX   BNP No results for input(s): BNPP in the last 72 hours. Liver Enzymes Recent Labs     09/12/20  0441   TP 6.1*   ALB 3.1*   AP 94      Thyroid Studies Lab Results   Component Value Date/Time    TSH 2.38 09/12/2020 04:41 AM        Procedures/imaging: see electronic medical records for all procedures/Xrays and details which were not copied into this note but were reviewed prior to creation of Plan    Ct Head Wo Cont    Result Date: 9/9/2020  EXAM: CT HEAD WO CONT CLINICAL INDICATION/HISTORY: ams COMPARISON: None. TECHNIQUE: Axial CT imaging of the head was performed without intravenous contrast. Sagittal and coronal reconstructions are provided. One or more dose reduction techniques were used on this CT: automated exposure control, adjustment of the mAs and/or kVp according to patient size, and iterative reconstruction techniques. The specific techniques used on this CT exam have been documented in the patient's electronic medical record.  Digital Imaging and Communications in Medicine (DICOM) format image data are available to nonaffiliated external healthcare facilities or entities on a secure, media free, reciprocally searchable basis with patient authorization for at least a 12-month period after this study _______________ FINDINGS: BRAIN AND POSTERIOR FOSSA: The sulci, folia, ventricles and basal cisterns are within normal limits for the patient's age. There is no intracranial hemorrhage, mass effect, or midline shift. There are scattered and confluent foci of decreased attenuation in the periventricular white matter which are nonspecific but most likely sequelae of chronic microvascular disease. EXTRA-AXIAL SPACES AND MENINGES: There are no abnormal extra-axial fluid collections. CALVARIUM: Intact. SINUSES: Clear. OTHER: Prior bilateral lens replacements. _______________     IMPRESSION: No acute intracranial abnormalities. Xr Chest Port    Result Date: 9/9/2020  EXAM: XR CHEST PORT CLINICAL INDICATION/HISTORY: OD   > Additional: Altered mental status COMPARISON: Correlation with rib series dated August 23, 2020 TECHNIQUE: Portable chest FINDINGS: SUPPORT DEVICES: None. HEART AND MEDIASTINUM: Normal size and contour. Normal pulmonary vasculature. LUNGS AND PLEURAL SPACES: Thin band of subsegmental atelectasis or scarring at the right lung base, unchanged. The lungs are otherwise well expanded and clear. No focal consolidation, effusion, or pneumothorax. BONY THORAX AND SOFT TISSUES: No acute osseous abnormality. IMPRESSION: No active cardiopulmonary disease. Xr Ribs Rt W Pa Cxr Min 3 V    Result Date: 8/23/2020  EXAM: Frontal view of the chest and 3 views of the right ribs CLINICAL INDICATION/HISTORY: Right rib pain COMPARISON: Chest radiograph dated 7/20/2011  FINDINGS: Linear opacity identified at the right lung base. No pleural effusion or pneumothorax identified. Questionable subtle cortical irregularity involving the lateral eighth and possibly ninth ribs. No other displaced rib fractures identified. IMPRESSION: 1. Equivocal findings of nondisplaced lateral right eighth and ninth rib fractures. No other rib fractures identified. 2. Linear atelectasis at the right lung base. No pleural effusion or pneumothorax identified.       Cameron Logan MD

## 2020-09-12 NOTE — PROGRESS NOTES
09/11/20 2013   CPAP/BIPAP   Device Mode AVAP   AVAP Set Resp Rate 20   AVAP Set VT (ml) 475   FiO2 reduced to 50%, rate increased to 20, targeting a Minute ventilation of 8. 5lpm or greater, currently NIV achieving 9.5 lpm on rate of 20. improve ventilation.

## 2020-09-13 PROBLEM — I50.30 DIASTOLIC CONGESTIVE HEART FAILURE (HCC): Status: ACTIVE | Noted: 2020-09-13

## 2020-09-13 PROBLEM — I34.0 NONRHEUMATIC MITRAL VALVE REGURGITATION: Status: ACTIVE | Noted: 2020-09-13

## 2020-09-13 PROBLEM — R16.2 HEPATOSPLENOMEGALY: Status: ACTIVE | Noted: 2020-09-13

## 2020-09-13 LAB
ALBUMIN SERPL-MCNC: 3 G/DL (ref 3.4–5)
ALBUMIN/GLOB SERPL: 1 {RATIO} (ref 0.8–1.7)
ALP SERPL-CCNC: 92 U/L (ref 45–117)
ALT SERPL-CCNC: 43 U/L (ref 13–56)
ANION GAP SERPL CALC-SCNC: 5 MMOL/L (ref 3–18)
ARTERIAL PATENCY WRIST A: YES
AST SERPL-CCNC: 104 U/L (ref 10–38)
ATRIAL RATE: 35 BPM
BASE EXCESS BLD CALC-SCNC: 5 MMOL/L
BASOPHILS # BLD: 0 K/UL (ref 0–0.1)
BASOPHILS NFR BLD: 0 % (ref 0–2)
BDY SITE: ABNORMAL
BILIRUB SERPL-MCNC: 0.6 MG/DL (ref 0.2–1)
BODY TEMPERATURE: 99.1
BUN SERPL-MCNC: 24 MG/DL (ref 7–18)
BUN/CREAT SERPL: 30 (ref 12–20)
CA-I SERPL-SCNC: 1.24 MMOL/L (ref 1.12–1.32)
CALCIUM SERPL-MCNC: 8.8 MG/DL (ref 8.5–10.1)
CALCULATED P AXIS, ECG09: 26 DEGREES
CALCULATED R AXIS, ECG10: 25 DEGREES
CALCULATED T AXIS, ECG11: 40 DEGREES
CHLORIDE SERPL-SCNC: 109 MMOL/L (ref 100–111)
CO2 SERPL-SCNC: 28 MMOL/L (ref 21–32)
CREAT SERPL-MCNC: 0.79 MG/DL (ref 0.6–1.3)
DIAGNOSIS, 93000: NORMAL
DIFFERENTIAL METHOD BLD: ABNORMAL
EOSINOPHIL # BLD: 0 K/UL (ref 0–0.4)
EOSINOPHIL NFR BLD: 1 % (ref 0–5)
ERYTHROCYTE [DISTWIDTH] IN BLOOD BY AUTOMATED COUNT: 15.3 % (ref 11.6–14.5)
GAS FLOW.O2 O2 DELIVERY SYS: ABNORMAL L/MIN
GLOBULIN SER CALC-MCNC: 3.1 G/DL (ref 2–4)
GLUCOSE BLD STRIP.AUTO-MCNC: 109 MG/DL (ref 70–110)
GLUCOSE BLD STRIP.AUTO-MCNC: 149 MG/DL (ref 70–110)
GLUCOSE BLD STRIP.AUTO-MCNC: 155 MG/DL (ref 70–110)
GLUCOSE BLD STRIP.AUTO-MCNC: 158 MG/DL (ref 70–110)
GLUCOSE SERPL-MCNC: 134 MG/DL (ref 74–99)
HAPTOGLOB SERPL-MCNC: 55 MG/DL (ref 30–200)
HCO3 BLD-SCNC: 30.7 MMOL/L (ref 22–26)
HCT VFR BLD AUTO: 30.7 % (ref 35–45)
HGB BLD-MCNC: 9.7 G/DL (ref 12–16)
INR PPP: 1.5 (ref 0.8–1.2)
LYMPHOCYTES # BLD: 0.2 K/UL (ref 0.9–3.6)
LYMPHOCYTES NFR BLD: 11 % (ref 21–52)
MAGNESIUM SERPL-MCNC: 1.8 MG/DL (ref 1.6–2.6)
MAGNESIUM SERPL-MCNC: 3.6 MG/DL (ref 1.6–2.6)
MCH RBC QN AUTO: 26.7 PG (ref 24–34)
MCHC RBC AUTO-ENTMCNC: 31.6 G/DL (ref 31–37)
MCV RBC AUTO: 84.6 FL (ref 74–97)
MONOCYTES # BLD: 0.1 K/UL (ref 0.05–1.2)
MONOCYTES NFR BLD: 4 % (ref 3–10)
NEUTS SEG # BLD: 1.2 K/UL (ref 1.8–8)
NEUTS SEG NFR BLD: 84 % (ref 40–73)
O2/TOTAL GAS SETTING VFR VENT: 0.3 %
P-R INTERVAL, ECG05: 174 MS
PCO2 BLD: 53.8 MMHG (ref 35–45)
PEEP RESPIRATORY: 6 CMH2O
PH BLD: 7.37 [PH] (ref 7.35–7.45)
PHOSPHATE SERPL-MCNC: 2.6 MG/DL (ref 2.5–4.9)
PIP ISTAT,IPIP: 16
PLATELET # BLD AUTO: 49 K/UL (ref 135–420)
PMV BLD AUTO: 9.9 FL (ref 9.2–11.8)
PO2 BLD: 91 MMHG (ref 80–100)
POTASSIUM SERPL-SCNC: 4.5 MMOL/L (ref 3.5–5.5)
PROT SERPL-MCNC: 6.1 G/DL (ref 6.4–8.2)
PROTHROMBIN TIME: 17.4 SEC (ref 11.5–15.2)
Q-T INTERVAL, ECG07: 550 MS
QRS DURATION, ECG06: 94 MS
QTC CALCULATION (BEZET), ECG08: 419 MS
RBC # BLD AUTO: 3.63 M/UL (ref 4.2–5.3)
SAO2 % BLD: 96 % (ref 92–97)
SERVICE CMNT-IMP: ABNORMAL
SODIUM SERPL-SCNC: 142 MMOL/L (ref 136–145)
SPECIMEN TYPE: ABNORMAL
TOTAL RESP. RATE, ITRR: 18
VENTRICULAR RATE, ECG03: 35 BPM
WBC # BLD AUTO: 1.4 K/UL (ref 4.6–13.2)

## 2020-09-13 PROCEDURE — 94640 AIRWAY INHALATION TREATMENT: CPT

## 2020-09-13 PROCEDURE — 74011250636 HC RX REV CODE- 250/636: Performed by: FAMILY MEDICINE

## 2020-09-13 PROCEDURE — 36415 COLL VENOUS BLD VENIPUNCTURE: CPT

## 2020-09-13 PROCEDURE — 82962 GLUCOSE BLOOD TEST: CPT

## 2020-09-13 PROCEDURE — C9113 INJ PANTOPRAZOLE SODIUM, VIA: HCPCS | Performed by: HOSPITALIST

## 2020-09-13 PROCEDURE — 36600 WITHDRAWAL OF ARTERIAL BLOOD: CPT

## 2020-09-13 PROCEDURE — 74011000250 HC RX REV CODE- 250: Performed by: INTERNAL MEDICINE

## 2020-09-13 PROCEDURE — 74011250636 HC RX REV CODE- 250/636: Performed by: HOSPITALIST

## 2020-09-13 PROCEDURE — 83735 ASSAY OF MAGNESIUM: CPT

## 2020-09-13 PROCEDURE — 85610 PROTHROMBIN TIME: CPT

## 2020-09-13 PROCEDURE — 80053 COMPREHEN METABOLIC PANEL: CPT

## 2020-09-13 PROCEDURE — 94660 CPAP INITIATION&MGMT: CPT

## 2020-09-13 PROCEDURE — 74011250636 HC RX REV CODE- 250/636: Performed by: INTERNAL MEDICINE

## 2020-09-13 PROCEDURE — 74011250637 HC RX REV CODE- 250/637: Performed by: INTERNAL MEDICINE

## 2020-09-13 PROCEDURE — 84100 ASSAY OF PHOSPHORUS: CPT

## 2020-09-13 PROCEDURE — 74011000250 HC RX REV CODE- 250: Performed by: HOSPITALIST

## 2020-09-13 PROCEDURE — 77010033678 HC OXYGEN DAILY

## 2020-09-13 PROCEDURE — 85025 COMPLETE CBC W/AUTO DIFF WBC: CPT

## 2020-09-13 PROCEDURE — 82330 ASSAY OF CALCIUM: CPT

## 2020-09-13 PROCEDURE — 74011636637 HC RX REV CODE- 636/637: Performed by: HOSPITALIST

## 2020-09-13 PROCEDURE — 82803 BLOOD GASES ANY COMBINATION: CPT

## 2020-09-13 PROCEDURE — 65610000006 HC RM INTENSIVE CARE

## 2020-09-13 RX ORDER — MAGNESIUM SULFATE 1 G/100ML
INJECTION INTRAVENOUS
Status: DISPENSED
Start: 2020-09-13 | End: 2020-09-13

## 2020-09-13 RX ORDER — FUROSEMIDE 10 MG/ML
20 INJECTION INTRAMUSCULAR; INTRAVENOUS DAILY
Status: DISCONTINUED | OUTPATIENT
Start: 2020-09-13 | End: 2020-09-18 | Stop reason: HOSPADM

## 2020-09-13 RX ORDER — HEPARIN SODIUM 5000 [USP'U]/ML
5000 INJECTION, SOLUTION INTRAVENOUS; SUBCUTANEOUS EVERY 8 HOURS
Status: DISCONTINUED | OUTPATIENT
Start: 2020-09-13 | End: 2020-09-18 | Stop reason: HOSPADM

## 2020-09-13 RX ORDER — LORAZEPAM 2 MG/ML
INJECTION INTRAMUSCULAR
Status: DISPENSED
Start: 2020-09-13 | End: 2020-09-13

## 2020-09-13 RX ORDER — MAGNESIUM SULFATE 1 G/100ML
1 INJECTION INTRAVENOUS ONCE
Status: COMPLETED | OUTPATIENT
Start: 2020-09-13 | End: 2020-09-13

## 2020-09-13 RX ORDER — LORAZEPAM 2 MG/ML
1 INJECTION INTRAMUSCULAR
Status: DISCONTINUED | OUTPATIENT
Start: 2020-09-13 | End: 2020-09-16

## 2020-09-13 RX ADMIN — ARFORMOTEROL TARTRATE 15 MCG: 15 SOLUTION RESPIRATORY (INHALATION) at 20:32

## 2020-09-13 RX ADMIN — LORAZEPAM 1 MG: 2 INJECTION INTRAMUSCULAR; INTRAVENOUS at 01:15

## 2020-09-13 RX ADMIN — IPRATROPIUM BROMIDE AND ALBUTEROL SULFATE 3 ML: .5; 3 SOLUTION RESPIRATORY (INHALATION) at 20:32

## 2020-09-13 RX ADMIN — LORAZEPAM 1 MG: 2 INJECTION INTRAMUSCULAR; INTRAVENOUS at 11:02

## 2020-09-13 RX ADMIN — METHYLPREDNISOLONE SODIUM SUCCINATE 40 MG: 40 INJECTION, POWDER, FOR SOLUTION INTRAMUSCULAR; INTRAVENOUS at 05:27

## 2020-09-13 RX ADMIN — METHYLPREDNISOLONE SODIUM SUCCINATE 20 MG: 40 INJECTION, POWDER, FOR SOLUTION INTRAMUSCULAR; INTRAVENOUS at 22:02

## 2020-09-13 RX ADMIN — HEPARIN SODIUM 5000 UNITS: 5000 INJECTION INTRAVENOUS; SUBCUTANEOUS at 18:46

## 2020-09-13 RX ADMIN — INSULIN LISPRO 2 UNITS: 100 INJECTION, SOLUTION INTRAVENOUS; SUBCUTANEOUS at 18:47

## 2020-09-13 RX ADMIN — CLINDAMYCIN PHOSPHATE 600 MG: 600 INJECTION, SOLUTION INTRAVENOUS at 10:14

## 2020-09-13 RX ADMIN — CLINDAMYCIN PHOSPHATE 600 MG: 600 INJECTION, SOLUTION INTRAVENOUS at 03:09

## 2020-09-13 RX ADMIN — LORAZEPAM 1 MG: 2 INJECTION INTRAMUSCULAR; INTRAVENOUS at 15:04

## 2020-09-13 RX ADMIN — LORAZEPAM 1 MG: 2 INJECTION INTRAMUSCULAR; INTRAVENOUS at 05:24

## 2020-09-13 RX ADMIN — IPRATROPIUM BROMIDE AND ALBUTEROL SULFATE 3 ML: .5; 3 SOLUTION RESPIRATORY (INHALATION) at 14:34

## 2020-09-13 RX ADMIN — METHYLPREDNISOLONE SODIUM SUCCINATE 20 MG: 40 INJECTION, POWDER, FOR SOLUTION INTRAMUSCULAR; INTRAVENOUS at 15:04

## 2020-09-13 RX ADMIN — Medication 10 ML: at 22:03

## 2020-09-13 RX ADMIN — SODIUM CHLORIDE 40 MG: 9 INJECTION, SOLUTION INTRAMUSCULAR; INTRAVENOUS; SUBCUTANEOUS at 22:02

## 2020-09-13 RX ADMIN — BUDESONIDE 500 MCG: 0.5 INHALANT RESPIRATORY (INHALATION) at 20:32

## 2020-09-13 RX ADMIN — BUDESONIDE 500 MCG: 0.5 INHALANT RESPIRATORY (INHALATION) at 07:50

## 2020-09-13 RX ADMIN — Medication 10 ML: at 15:04

## 2020-09-13 RX ADMIN — ARFORMOTEROL TARTRATE 15 MCG: 15 SOLUTION RESPIRATORY (INHALATION) at 07:49

## 2020-09-13 RX ADMIN — CLINDAMYCIN PHOSPHATE 600 MG: 600 INJECTION, SOLUTION INTRAVENOUS at 18:47

## 2020-09-13 RX ADMIN — FUROSEMIDE 20 MG: 10 INJECTION, SOLUTION INTRAMUSCULAR; INTRAVENOUS at 11:04

## 2020-09-13 RX ADMIN — LORAZEPAM 1 MG: 2 INJECTION INTRAMUSCULAR; INTRAVENOUS at 08:09

## 2020-09-13 RX ADMIN — MAGNESIUM SULFATE HEPTAHYDRATE 1 G: 1 INJECTION, SOLUTION INTRAVENOUS at 05:27

## 2020-09-13 RX ADMIN — HEPARIN SODIUM 5000 UNITS: 5000 INJECTION INTRAVENOUS; SUBCUTANEOUS at 09:18

## 2020-09-13 RX ADMIN — IPRATROPIUM BROMIDE AND ALBUTEROL SULFATE 3 ML: .5; 3 SOLUTION RESPIRATORY (INHALATION) at 07:50

## 2020-09-13 RX ADMIN — Medication 10 ML: at 05:25

## 2020-09-13 NOTE — PROGRESS NOTES
Phone: [phone]  Paging : [phone]      Hematology / Oncology Initial Consult Note    Admit Date: 9/9/2020    Reason for Consult: pancytopenia    Requesting Physician: Ld Moraes    Assessment:     Andrea Bailey is a 72 y.o., [address] female, who I have been asked to see for pancytopenia    Principal Problem:    Methadone overdose ([address] (9/9/2020)    Active Problems:    Hypercarbia (9/9/2020)      Hypoxia (9/9/2020)      LIONEL (acute kidney injury) ([address] (9/9/2020)      Prolonged Q-T interval on ECG (9/9/2020)      Bradycardia (9/9/2020)      Encephalopathy acute (9/9/2020)      Thrombocytopenia ([address] (9/10/2020)      Abnormal echocardiogram (9/11/2020)      Acute on chronic respiratory failure with hypoxia and hypercapnia ([address] (9/11/2020)      Pancytopenia ([address] (1/31/2154)      Diastolic congestive heart failure ([address] (9/13/2020)      Nonrheumatic mitral valve regurgitation (9/13/2020)        Plan:     1. Smear is reviewed. No plt clumping, no schistocytes, no hypochromia. I did not see bandemia or other signs of infection. Decreased WBC. Given alcohol abuse, cirrhosis and chronic thrombocytopenia in the setting of normal renal function, this is unlikely TTP. Normal fibrinogen on DIC panel, mild INR can be present 2nd to cirrhosis. Of note, HIV negative 2011. Pending now. Will check HCV    2. Leukopenia is chronic and will not investigate. 3. Anemia is relatively new. normal iron, B12 and pending haptoglobin and folate acid. mild LDH doubt hemolysis. 4. Splenomegaly and cirrhosis seen on ultrasound, which explains her heme findings. No further investigate. If bleeding, transfuse to raise plt. 5. For DVT ppx purposes, can use heparin subQ 5000 tid, with short half life. Not suspecting HIT. Doubt any hematological etiology for mental status change. The rest of care per ICU and primary team.     Will follow with you.      History of Present Illness: Adeel Disla is a 72 y.o. female who is now admitted for assumed drug overdose, urine shows methadone and cannabinal. Hematology is consulted for abnormal cbcs. On admission Hb 10.8, today 9.8. plt 41 today 38. Baseline back in 2015 34. Hb 11.8. WBC on admission 2.2 today 1.4. baseline 1.3 in 2015. Cr 0.92, LFTs with elevated AST only . No protein gap. Labs from 8/29 [facility], also wbc 2.5, hb 11.8, plt 46. She is also under the care of cardiology to rule out valve vegetation. She is in ICU on BiPAP. ROS:    Per chart  Not able to review due to drowsiness    Past Medical History:   Diagnosis Date    Chronic back pain     Diabetes [facility]     Drug abuse ([facility]     Hepatic cirrhosis ([facility]     Hepatitis C     Heroin abuse ([facility]     Pain management     Restless leg syndrome     Sciatica     Spleen enlarged     Thyroid disease        Past Surgical History:   Procedure Laterality Date    HX CHOLECYSTECTOMY      HX GYN      hysterectomy    HX HEENT      T&A    HX ORTHOPAEDIC      Bunion, left hand and right arm abscess removal       History reviewed. No pertinent family history.     Social History     Socioeconomic History    Marital status:      Spouse name: Not on file    Number of children: Not on file    Years of education: Not on file    Highest education level: Not on file   Tobacco Use    Smoking status: Current Every Day Smoker    Smokeless tobacco: Never Used   Substance and Sexual Activity    Alcohol use: No    Drug use: Yes       Current Facility-Administered Medications   Medication Dose Route Frequency    LORazepam (ATIVAN) injection 1 mg  1 mg IntraVENous Q2H PRN    LORazepam (ATIVAN) 2 mg/mL injection        magnesium sulfate 1 gram/100 mL IVPB premix pgbk        heparin (porcine) injection 5,000 Units  5,000 Units SubCUTAneous Q8H    methylPREDNISolone (PF) (SOLU-MEDROL) injection 20 mg  20 mg IntraVENous Q8H    furosemide (LASIX) injection 20 mg  20 mg IntraVENous DAILY    clindamycin (CLEOCIN) 600mg NS 50 mL IVPB (premix)  600 mg IntraVENous Q8H    dextrose 5% and 0.9% NaCl infusion  25 mL/hr IntraVENous CONTINUOUS    ELECTROLYTE REPLACEMENT PROTOCOL - Phosphorus  Standard Dosing  1 Each Other PRN    nicotine (NICODERM CQ) 14 mg/24 hr patch 1 Patch  1 Patch TransDERmal DAILY    budesonide (PULMICORT) 500 mcg/2 ml nebulizer suspension  500 mcg Nebulization BID RT    albuterol-ipratropium (DUO-NEB) 2.5 MG-0.5 MG/3 ML  3 mL Nebulization TID RT    promethazine (PHENERGAN) 12.5 mg in 0.9% sodium chloride 50 mL IVPB  12.5 mg IntraVENous Q6H PRN    arformoteroL (BROVANA) neb solution 15 mcg  15 mcg Nebulization BID RT    sodium chloride (NS) flush 5-40 mL  5-40 mL IntraVENous Q8H    sodium chloride (NS) flush 5-40 mL  5-40 mL IntraVENous PRN    acetaminophen (TYLENOL) tablet 650 mg  650 mg Oral Q6H PRN    Or    acetaminophen (TYLENOL) suppository 650 mg  650 mg Rectal Q6H PRN    bisacodyL (DULCOLAX) suppository 10 mg  10 mg Rectal DAILY PRN    promethazine (PHENERGAN) tablet 12.5 mg  12.5 mg Oral Q6H PRN    Or    ondansetron (ZOFRAN) injection 4 mg  4 mg IntraVENous Q6H PRN    naloxone (NARCAN) injection 0.4 mg  0.4 mg IntraVENous EVERY 2 MINUTES AS NEEDED    insulin lispro (HUMALOG) injection   SubCUTAneous Q6H    glucose chewable tablet 16 g  4 Tab Oral PRN    glucagon (GLUCAGEN) injection 1 mg  1 mg IntraMUSCular PRN    ELECTROLYTE REPLACEMENT PROTOCOL - Potassium Standard Dosing   1 Each Other PRN    ELECTROLYTE REPLACEMENT PROTOCOL - Magnesium   1 Each Other PRN    ELECTROLYTE REPLACEMENT PROTOCOL - Calcium   1 Each Other PRN    cloNIDine HCL (CATAPRES) tablet 0.1 mg  0.1 mg Oral Q4H PRN    pantoprazole (PROTONIX) 40 mg in 0.9% sodium chloride 10 mL injection  40 mg IntraVENous Q24H       Prior to Admission medications    Medication Sig Start Date End Date Taking?  Authorizing Provider   prazosin (MINIPRESS) 2 mg capsule Take 2 mg by mouth nightly. Yes Provider, Historical   vit A/vit C/vit E/zinc/copper (PRESERVISION AREDS PO) Take 1 Cap by mouth two (2) times a day. Yes Provider, Historical   rifAXIMin (XIFAXAN) 550 mg tablet Take 550 mg by mouth two (2) times a day. Yes Provider, Historical   zinc 50 mg tab tablet Take 50 mg by mouth daily. Yes Provider, Historical   lurasidone (Latuda) 40 mg tab tablet Take 40 mg by mouth daily (with dinner). Yes Provider, Historical   pregabalin (Lyrica) 75 mg capsule Take 75 mg by mouth two (2) times a day. Yes Provider, Historical   escitalopram oxalate (LEXAPRO) 20 mg tablet Take 10 mg by mouth daily. Yes Provider, Historical   methadone (DOLOPHINE) 10 mg/mL solution Take 65 mg by mouth daily. Yes Provider, Historical   hydrOXYzine (VISTARIL) 25 mg capsule Take 50 mg by mouth two (2) times daily as needed for Itching or Anxiety. OtherBoo MD   levothyroxine (SYNTHROID) 50 mcg tablet Take 50 mcg by mouth Daily (before breakfast). Boo Hurt MD   rOPINIRole (REQUIP) 0.25 mg tablet Take 2 mg by mouth nightly as needed. Indications: restless legs syndrome, an extreme discomfort in the calf muscles when sitting or lying down    Boo Hurt MD   spironolactone (ALDACTONE) 25 mg tablet Take  by mouth daily. Boo Hurt MD   traZODone (DESYREL) 100 mg tablet Take 200 mg by mouth nightly. Boo Hurt MD       Allergies   Allergen Reactions    Erythromycin Hives    Penicillins Hives    Tetracycline Hives       ECOG PS:    Physical Exam:    Temp (24hrs), Av.9 °F (36.6 °C), Min:97.2 °F (36.2 °C), Max:99.1 °F (37.3 °C)  VSIP    Intake/Output Summary (Last 24 hours) at 2020 1025  Last data filed at 2020 0800  Gross per 24 hour   Intake 866.94 ml   Output 700 ml   Net 166.94 ml   RESULTRCNT(24h)Ct Head Wo Cont    Result Date: 2020  EXAM: CT HEAD WO CONT CLINICAL INDICATION/HISTORY: ams COMPARISON: None.  TECHNIQUE: Axial CT imaging of the head was performed without intravenous contrast. Sagittal and coronal reconstructions are provided. One or more dose reduction techniques were used on this CT: automated exposure control, adjustment of the mAs and/or kVp according to patient size, and iterative reconstruction techniques. The specific techniques used on this CT exam have been documented in the patient's electronic medical record. Digital Imaging and Communications in Medicine (DICOM) format image data are available to nonaffiliated external healthcare facilities or entities on a secure, media free, reciprocally searchable basis with patient authorization for at least a 12-month period after this study _______________ FINDINGS: BRAIN AND POSTERIOR FOSSA: The sulci, folia, ventricles and basal cisterns are within normal limits for the patient's age. There is no intracranial hemorrhage, mass effect, or midline shift. There are scattered and confluent foci of decreased attenuation in the periventricular white matter which are nonspecific but most likely sequelae of chronic microvascular disease. EXTRA-AXIAL SPACES AND MENINGES: There are no abnormal extra-axial fluid collections. CALVARIUM: Intact. SINUSES: Clear. OTHER: Prior bilateral lens replacements. _______________     IMPRESSION: No acute intracranial abnormalities. Xr Chest Port    Result Date: 9/9/2020  EXAM: XR CHEST PORT CLINICAL INDICATION/HISTORY: OD   > Additional: Altered mental status COMPARISON: Correlation with rib series dated August 23, 2020 TECHNIQUE: Portable chest _______________ FINDINGS: SUPPORT DEVICES: None. HEART AND MEDIASTINUM: Normal size and contour. Normal pulmonary vasculature. LUNGS AND PLEURAL SPACES: Thin band of subsegmental atelectasis or scarring at the right lung base, unchanged. The lungs are otherwise well expanded and clear. No focal consolidation, effusion, or pneumothorax. BONY THORAX AND SOFT TISSUES: No acute osseous abnormality.  _______________     IMPRESSION: No active cardiopulmonary disease. Xr Ribs Rt W Pa Cxr Min 3 V    Result Date: 8/23/2020  EXAM: Frontal view of the chest and 3 views of the right ribs CLINICAL INDICATION/HISTORY: Right rib pain COMPARISON: Chest radiograph dated 7/20/2011 _______________ FINDINGS: Linear opacity identified at the right lung base. No pleural effusion or pneumothorax identified. Questionable subtle cortical irregularity involving the lateral eighth and possibly ninth ribs. No other displaced rib fractures identified. _______________     IMPRESSION: 1. Equivocal findings of nondisplaced lateral right eighth and ninth rib fractures. No other rib fractures identified. 2. Linear atelectasis at the right lung base. No pleural effusion or pneumothorax identified. General: drousey hard to arouse. HEENT: no pallor, anicteric sclera, oral pharynx without lesion   No cervical, supraclavicular, axillary and inguinal lymphadenopathy palpated  Heart: regular rate, and rhythm, without murmur, gallop or rubbing  Lungs:breathing comfortably on room air, clear to auscultation and percussion bilaterally  ABD: bowel sound present, soft, nondistended, nontender, no hepatosplenomegaly or mass  Extremities: warm, well perfused, mild edema  MSK: no tenderness along the spine or long bones  Skin: vitiligo noted  Neuro: could not assess      Recent Results (from the past 24 hour(s))   POC G3    Collection Time: 09/12/20 11:36 AM   Result Value Ref Range    Device: BIPAP      FIO2 (POC) 40 %    pH (POC) 7.28 (L) 7.35 - 7.45      pCO2 (POC) 60.9 (H) 35.0 - 45.0 MMHG    pO2 (POC) 41 (LL) 80 - 100 MMHG    HCO3 (POC) 28.6 (H) 22 - 26 MMOL/L    sO2 (POC) 68 (L) 92 - 97 %    Base excess (POC) 2 mmol/L    PEEP/CPAP (POC) 8.0 cmH2O    PIP (POC) 34      Allens test (POC) YES      Total resp.  rate 21      Site LEFT RADIAL      Specimen type (POC) ARTERIAL      Performed by Domo TERRY, POC    Collection Time: 09/12/20 12:06 PM Result Value Ref Range    Glucose (POC) 126 (H) 70 - 110 mg/dL   POC G3    Collection Time: 09/12/20  1:18 PM   Result Value Ref Range    Device: BIPAP      FIO2 (POC) 40 %    pH (POC) 7.36 7.35 - 7.45      pCO2 (POC) 47.0 (H) 35.0 - 45.0 MMHG    pO2 (POC) 142 (H) 80 - 100 MMHG    HCO3 (POC) 26.8 (H) 22 - 26 MMOL/L    sO2 (POC) 99 (H) 92 - 97 %    Base excess (POC) 1 mmol/L    PEEP/CPAP (POC) 6.0 cmH2O    PIP (POC) 18      Allens test (POC) YES      Total resp. rate 18      Site RIGHT BRACHIAL      Specimen type (POC) ARTERIAL      Performed by Lucero Castaneda     Spontaneous timed YES     GLUCOSE, POC    Collection Time: 09/12/20  6:01 PM   Result Value Ref Range    Glucose (POC) 172 (H) 70 - 110 mg/dL   GLUCOSE, POC    Collection Time: 09/12/20 11:10 PM   Result Value Ref Range    Glucose (POC) 128 (H) 70 - 110 mg/dL   MAGNESIUM    Collection Time: 09/13/20  4:20 AM   Result Value Ref Range    Magnesium 1.8 1.6 - 2.6 mg/dL   CALCIUM, IONIZED    Collection Time: 09/13/20  4:20 AM   Result Value Ref Range    Ionized Calcium 1.24 1.12 - 1.32 MMOL/L   PHOSPHORUS    Collection Time: 09/13/20  4:20 AM   Result Value Ref Range    Phosphorus 2.6 2.5 - 4.9 MG/DL   CBC WITH AUTOMATED DIFF    Collection Time: 09/13/20  4:20 AM   Result Value Ref Range    WBC 1.4 (L) 4.6 - 13.2 K/uL    RBC 3.63 (L) 4.20 - 5.30 M/uL    HGB 9.7 (L) 12.0 - 16.0 g/dL    HCT 30.7 (L) 35.0 - 45.0 %    MCV 84.6 74.0 - 97.0 FL    MCH 26.7 24.0 - 34.0 PG    MCHC 31.6 31.0 - 37.0 g/dL    RDW 15.3 (H) 11.6 - 14.5 %    PLATELET 49 (L) 152 - 420 K/uL    MPV 9.9 9.2 - 11.8 FL    NEUTROPHILS 84 (H) 40 - 73 %    LYMPHOCYTES 11 (L) 21 - 52 %    MONOCYTES 4 3 - 10 %    EOSINOPHILS 1 0 - 5 %    BASOPHILS 0 0 - 2 %    ABS. NEUTROPHILS 1.2 (L) 1.8 - 8.0 K/UL    ABS. LYMPHOCYTES 0.2 (L) 0.9 - 3.6 K/UL    ABS. MONOCYTES 0.1 0.05 - 1.2 K/UL    ABS. EOSINOPHILS 0.0 0.0 - 0.4 K/UL    ABS.  BASOPHILS 0.0 0.0 - 0.1 K/UL    DF AUTOMATED     METABOLIC PANEL, COMPREHENSIVE    Collection Time: 09/13/20  4:20 AM   Result Value Ref Range    Sodium 142 136 - 145 mmol/L    Potassium 4.5 3.5 - 5.5 mmol/L    Chloride 109 100 - 111 mmol/L    CO2 28 21 - 32 mmol/L    Anion gap 5 3.0 - 18 mmol/L    Glucose 134 (H) 74 - 99 mg/dL    BUN 24 (H) 7.0 - 18 MG/DL    Creatinine 0.79 0.6 - 1.3 MG/DL    BUN/Creatinine ratio 30 (H) 12 - 20      GFR est AA >60 >60 ml/min/1.73m2    GFR est non-AA >60 >60 ml/min/1.73m2    Calcium 8.8 8.5 - 10.1 MG/DL    Bilirubin, total 0.6 0.2 - 1.0 MG/DL    ALT (SGPT) 43 13 - 56 U/L    AST (SGOT) 104 (H) 10 - 38 U/L    Alk. phosphatase 92 45 - 117 U/L    Protein, total 6.1 (L) 6.4 - 8.2 g/dL    Albumin 3.0 (L) 3.4 - 5.0 g/dL    Globulin 3.1 2.0 - 4.0 g/dL    A-G Ratio 1.0 0.8 - 1.7     PROTHROMBIN TIME + INR    Collection Time: 09/13/20  4:20 AM   Result Value Ref Range    Prothrombin time 17.4 (H) 11.5 - 15.2 sec    INR 1.5 (H) 0.8 - 1.2     GLUCOSE, POC    Collection Time: 09/13/20  5:23 AM   Result Value Ref Range    Glucose (POC) 149 (H) 70 - 110 mg/dL   POC G3    Collection Time: 09/13/20  5:54 AM   Result Value Ref Range    Device: BIPAP      FIO2 (POC) 0.30 %    pH (POC) 7.37 7.35 - 7.45      pCO2 (POC) 53.8 (H) 35.0 - 45.0 MMHG    pO2 (POC) 91 80 - 100 MMHG    HCO3 (POC) 30.7 (H) 22 - 26 MMOL/L    sO2 (POC) 96 92 - 97 %    Base excess (POC) 5 mmol/L    PEEP/CPAP (POC) 6 cmH2O    PIP (POC) 16      Allens test (POC) YES      Total resp. rate 18      Site LEFT RADIAL      Patient temp. 99.1      Specimen type (POC) ARTERIAL      Performed by Carolyn Tan Head Wo Cont    Result Date: 9/9/2020  EXAM: CT HEAD WO CONT CLINICAL INDICATION/HISTORY: ams COMPARISON: None. TECHNIQUE: Axial CT imaging of the head was performed without intravenous contrast. Sagittal and coronal reconstructions are provided.  One or more dose reduction techniques were used on this CT: automated exposure control, adjustment of the mAs and/or kVp according to patient size, and iterative reconstruction techniques. The specific techniques used on this CT exam have been documented in the patient's electronic medical record. Digital Imaging and Communications in Medicine (DICOM) format image data are available to nonaffiliated external healthcare facilities or entities on a secure, media free, reciprocally searchable basis with patient authorization for at least a 12-month period after this study _______________ FINDINGS: BRAIN AND POSTERIOR FOSSA: The sulci, folia, ventricles and basal cisterns are within normal limits for the patient's age. There is no intracranial hemorrhage, mass effect, or midline shift. There are scattered and confluent foci of decreased attenuation in the periventricular white matter which are nonspecific but most likely sequelae of chronic microvascular disease. EXTRA-AXIAL SPACES AND MENINGES: There are no abnormal extra-axial fluid collections. CALVARIUM: Intact. SINUSES: Clear. OTHER: Prior bilateral lens replacements. _______________     IMPRESSION: No acute intracranial abnormalities. Xr Chest Port    Result Date: 9/9/2020  EXAM: XR CHEST PORT CLINICAL INDICATION/HISTORY: OD   > Additional: Altered mental status COMPARISON: Correlation with rib series dated August 23, 2020 TECHNIQUE: Portable chest _______________ FINDINGS: SUPPORT DEVICES: None. HEART AND MEDIASTINUM: Normal size and contour. Normal pulmonary vasculature. LUNGS AND PLEURAL SPACES: Thin band of subsegmental atelectasis or scarring at the right lung base, unchanged. The lungs are otherwise well expanded and clear. No focal consolidation, effusion, or pneumothorax. BONY THORAX AND SOFT TISSUES: No acute osseous abnormality. _______________     IMPRESSION: No active cardiopulmonary disease.     Xr Ribs Rt W Pa Cxr Min 3 V    Result Date: 8/23/2020  EXAM: Frontal view of the chest and 3 views of the right ribs CLINICAL INDICATION/HISTORY: Right rib pain COMPARISON: Chest radiograph dated 7/20/2011 _______________ FINDINGS: Linear opacity identified at the right lung base. No pleural effusion or pneumothorax identified. Questionable subtle cortical irregularity involving the lateral eighth and possibly ninth ribs. No other displaced rib fractures identified. _______________     IMPRESSION: 1. Equivocal findings of nondisplaced lateral right eighth and ninth rib fractures. No other rib fractures identified. 2. Linear atelectasis at the right lung base. No pleural effusion or pneumothorax identified.         Carley Amin MD, PhD  Office: [phone]  Cell: [phone]

## 2020-09-13 NOTE — PROGRESS NOTES
CBC stable. No iron b12 deficiency   RBC folate Haptoglobin pending but LDH is not suggestive of high degree hemolysis. No DIC not suspecting TTP  Liver and spleen ultrasound pending. No intervention from heme perspective.    Will follow with you

## 2020-09-13 NOTE — PROGRESS NOTES
Cardiology Progress Note        Patient: Juan Groves        Sex: female          DOA: 9/9/2020  YOB: 1950      Age:  72 y.o.        LOS:  LOS: 4 days    Patient seen and examined, chart reviewed. Assessment/Plan     Patient Active Problem List   Diagnosis Code    Hypercarbia R06.89    Hypoxia R09.02    Methadone overdose ([facility] T40.3X1A    LIONEL (acute kidney injury) ([facility] N17.9    Prolonged Q-T interval on ECG R94.31    Bradycardia R00.1    Encephalopathy acute G93.40    Thrombocytopenia (HCC) D69.6    Abnormal echocardiogram R93.1    Acute on chronic respiratory failure with hypoxia and hypercapnia (HCC) J96.21, J96.22    Pancytopenia (HCC) E52.438    Diastolic congestive heart failure (HCC) I50.30    Nonrheumatic mitral valve regurgitation I34.0    Hepatosplenomegaly R16.2      Bradycardia: medication induced, improving     Plan:    Continue management as per hospital medicine  Avoid AV dyan blocking agents                  Subjective:    cc: I want to go home      REVIEW OF SYSTEMS:     Can not obtain     Objective:      Visit Vitals  BP (!) 142/69   Pulse (!) 52   Temp 98.4 °F (36.9 °C)   Resp 11   Ht 5' 2\" (1.575 m)   Wt 85.4 kg (188 lb 4.4 oz)   SpO2 95%   BMI 34.44 kg/m²     Body mass index is 34.44 kg/m². Physical Exam:  General Appearance: Comfortable, not using accessory muscles of respiration. HEENT: CLARA. HEAD: Atraumatic  NECK: No JVD, no thyroidomeglay. CAROTIDS: No bruit  LUNGS: Clear bilaterally. HEART: S1+S2 audible, no murmur, no pericardial rub.     ABD: Non-tender, BS Audible    NEUROLOGICAL: Drowsy, Follows some commands     Medication:  Current Facility-Administered Medications   Medication Dose Route Frequency    LORazepam (ATIVAN) injection 1 mg  1 mg IntraVENous Q2H PRN    magnesium sulfate 1 gram/100 mL IVPB premix pgbk        heparin (porcine) injection 5,000 Units  5,000 Units SubCUTAneous Q8H    methylPREDNISolone (PF) (SOLU-MEDROL) injection 20 mg  20 mg IntraVENous Q8H    furosemide (LASIX) injection 20 mg  20 mg IntraVENous DAILY    clindamycin (CLEOCIN) 600mg NS 50 mL IVPB (premix)  600 mg IntraVENous Q8H    dextrose 5% and 0.9% NaCl infusion  25 mL/hr IntraVENous CONTINUOUS    ELECTROLYTE REPLACEMENT PROTOCOL - Phosphorus  Standard Dosing  1 Each Other PRN    nicotine (NICODERM CQ) 14 mg/24 hr patch 1 Patch  1 Patch TransDERmal DAILY    budesonide (PULMICORT) 500 mcg/2 ml nebulizer suspension  500 mcg Nebulization BID RT    albuterol-ipratropium (DUO-NEB) 2.5 MG-0.5 MG/3 ML  3 mL Nebulization TID RT    promethazine (PHENERGAN) 12.5 mg in 0.9% sodium chloride 50 mL IVPB  12.5 mg IntraVENous Q6H PRN    arformoteroL (BROVANA) neb solution 15 mcg  15 mcg Nebulization BID RT    sodium chloride (NS) flush 5-40 mL  5-40 mL IntraVENous Q8H    sodium chloride (NS) flush 5-40 mL  5-40 mL IntraVENous PRN    acetaminophen (TYLENOL) tablet 650 mg  650 mg Oral Q6H PRN    Or    acetaminophen (TYLENOL) suppository 650 mg  650 mg Rectal Q6H PRN    bisacodyL (DULCOLAX) suppository 10 mg  10 mg Rectal DAILY PRN    promethazine (PHENERGAN) tablet 12.5 mg  12.5 mg Oral Q6H PRN    Or    ondansetron (ZOFRAN) injection 4 mg  4 mg IntraVENous Q6H PRN    naloxone (NARCAN) injection 0.4 mg  0.4 mg IntraVENous EVERY 2 MINUTES AS NEEDED    insulin lispro (HUMALOG) injection   SubCUTAneous Q6H    glucose chewable tablet 16 g  4 Tab Oral PRN    glucagon (GLUCAGEN) injection 1 mg  1 mg IntraMUSCular PRN    ELECTROLYTE REPLACEMENT PROTOCOL - Potassium Standard Dosing   1 Each Other PRN    ELECTROLYTE REPLACEMENT PROTOCOL - Magnesium   1 Each Other PRN    ELECTROLYTE REPLACEMENT PROTOCOL - Calcium   1 Each Other PRN    cloNIDine HCL (CATAPRES) tablet 0.1 mg  0.1 mg Oral Q4H PRN    pantoprazole (PROTONIX) 40 mg in 0.9% sodium chloride 10 mL injection  40 mg IntraVENous Q24H               Lab/Data Reviewed: Recent Labs     09/13/20  0420 09/12/20 0441 09/11/20  0420   WBC 1.4* 1.4* 4.1*   HGB 9.7* 9.8* 11.9*   HCT 30.7* 30.7* 37.5   PLT 49* 38* 50*     Recent Labs     09/13/20  0420 09/12/20 0441 09/11/20  0420    143 142   K 4.5 4.4 4.7    111 110   CO2 28 27 28   * 110* 65*   BUN 24* 24* 23*   CREA 0.79 0.82 0.87   CA 8.8 9.1 8.8       Signed By: Hailee Kaye MD     September 13, 2020

## 2020-09-13 NOTE — PROGRESS NOTES
[facility]  Pulmonary, Critical Care, and Sleep Medicine    Name: Negro Oliva MRN: [id number]   : 1950 Hospital: [facility] [place]   Date: 2020        PCCM Initial Patient Consult                                              Consult requesting physician: Blu, MD Boo    Reason for Consult: overdose methadone, abnormal ECG, change in mental status    [x] I have reviewed the flowsheet and previous days notes. Events, vitals, medications and notes from last 24 hours reviewed. Care plan discussed with staff, patient, family and on multidisciplinary rounds. Subjective:   2020     Patient Active Problem List   Diagnosis Code    Hypercarbia R06.89    Hypoxia R09.02    Methadone overdose ([facility] [address] T40.3X1A    LIONEL (acute kidney injury) ([facility] [address] N17.9    Prolonged Q-T interval on ECG R94.31    Bradycardia R00.1    Encephalopathy acute G93.40    Thrombocytopenia (HCC) D69.6    Abnormal echocardiogram R93.1    Acute on chronic respiratory failure with hypoxia and hypercapnia (HCC) J96.21, J96.22    Pancytopenia (HCC) G81.940    Diastolic congestive heart failure (HCC) I50.30    Nonrheumatic mitral valve regurgitation I34.0    Hepatosplenomegaly R16.2        IMPRESSION:   · Hypercarbic hypoxemic respiratory failure - methadone overdose and baseline copd   · BiPAP at night daytime PRN, bronchodilators, steroids, antibiotics  · Patient has a history of asthma/copd   · History of asthma/COPD start bronchodilators. · Overdose of methadone- resolved now withdrawal  from opiates   · CHF/MR/TR  · Abnormal EKG QTC post methadone overdose   · abnormal echo ? Tr ? Issues chronic  vs vegetation ?less likely   · Encephalopathy toxic metabolic /overdose   · Suicidal attempt  · Depression  · Previous suicidal attempts.   · Chronic  thrombocytopenia now pancytopenia hematology consulation  (  said large spleen chronic  issues) HIV pending   · Liver cirrhotics - ETOH in the past not current · Code status: full      RECOMMENDATIONS:   · CVS: sinus bradycardia. follow QTc, trop echo ? vegetation less likley spoke to cardiology . Less likley previous IVDA. CHF diastolic . Follow ecg/trop electrolytes . gentle diuresis   ·  Bradycardia related to overdose of methadone, given Narcan now improved. · Ativan and Precedex as needed with HR monitoring     · Respiratory: has COPD/overdose methadone acute on chronic  hypercarbia hypoxemia  · Now on max BIPAP is fails intubated family aware   · BIPAP at night daytime PRN  · Bronchodilators , steroids abx   · Prn narcan now off drip  ·  Mild hypercarbia hypoxemia related to overdose of methadone improving now follow ABG, start bronchodilators patient he says history of asthma  · ID: afebrile . Possible aspiration on clindamycin . cough or fever or reported an illness recently follow white blood cells. CXR LLL? Scar vs infiltrate   · Hematology/Oncology:pancytopenia, history of thrombocytopenia as per  enlarged spleen . platelets   Very low. I send HIV test consulted hematology. Prophylaxis start heparin sq on 9/13/2020. History of anemia follow-up hemoglobin  · Renal: Follow CPK renal function  · GI/: DVT GI prophylaxis  · Endocrine: Low glucose sliding scale insulin  · Neurology : CT of the head negative, now awake but in acute withdrawal  on prece dex drip. Follow qtc as of now not able to use neuroleptics at this point. I spoke to psychiatry can resume low dose methadone once clinically better. · Pain/Sedation: Avoid opiates if in severe pain consider low-dose Percocet  · Musculoskeletal: Non  · Prophylaxis: GI Prophylaxis with PI DVT Prophylaxis with Lovenox  · Disposition: To remain ICU for 24 hours  ·                                                                      OTHER:   · Glycemic Control. Glucose stabilizer per ICU protocol when on insulin drip. Maintain blood glucose 140-180.    · Replace electrolytes per ICU electrolyte replacement protocol  ·   · HOB >=30 degree elevation all the time. Aggressive pulmonary toileting. Incentive spirometry when appropriate. Aspiration precautions. · Sepsis bundle and protocol followed. Follow serial lactic acid when >2, frequent BMP check, fluid resuscitation. Draw cultures before antibiotic. Follow cultures. Antibiotic choice. Administer antibiotic within 1 hr ICU admission and 3 hours of ED arrival. Deescalate antibiotic when appropriate. · Vasopressor when appropriate with MAP goal >65 mmHg. · Central Line bundle followed, remove when not needed. Large bore IV line or CVP when appropriate. · Epstein bundle followed, remove epstein catheter when not critically ill. · Skin & Wound care. · Weekly prealbumin, nutritional consult. · PT/OT eval and treat. OOB/IS when appropriate. · Palliative care consult when appropriate. · Further recommendations will be based on the patient's response to recommended treatment and results of the investigation ordered. Quality Care: Stress ulcer prophylaxis, DVT prophylaxis, HOB elevated, Infection control all reviewed and addressed. Events and notes from last 24 hours reviewed. Care plan discussed with nursing, MDR  D/w patient above medical problems, labs, radiologic w/u, prognosis, code status, medication/procedure side effects/complications etc discussed, and answered all questions to their satisfaction. See my orders for detail. CC TIME: 55 minutes of critical care time min      · Prior/Old records reviewed and discussed with patient. · Labs, Images and available PFT and sleep study discussed with patient. Labs and images personally seen and available reports reviewed with patient. · All current medicines are reviewed and doses and prescription adjusted. · Further management depending on test results and work up as outlined above.       The patient is: [x] acutely ill Risk of deterioration: [] moderate    [x] critically ill  [x] high History of Present Illness:     Mary Parrish has been seen and evaluated in ICU Patient is a 72 y.o. female with PMHx significant for heroin abuse currently on methadone, history of severe depression, history of previous suicidal attempt last psychiatric hospitalization 2018 in [place], history of smoking and asthma, diabetes, hypertension. Patient was admitted to intensive care unit for observation and Narcan drip and abnormal EKG after she was found to be poorly responsive and overdose on methadone. EMS brought patient with change in mental status and after 1 dose of Narcan she started to slightly improve she was eventually started on a Narcan drip. She was found to have a bradycardia and prolonged QTC. CT of the head was normal.  Blood pressure stable. Patient eventually today in intensive care unit work-up. Follow command. And started to go through opiate withdrawal.  So methadone was stopped. She was given low dose of Ativan and low-dose of Precedex. Monitoring closely blood pressure EKG and electrolytes. We have consulted psychiatry. Possible another suicidal attempt. Patient needs to have a sitter. Psychiatry is advised to treat withdrawal and wait with resume doing her methadone at this point. Mild hypoxemia but no cough and fever no cough with contacts. GERD with known asthma on bronchodilators at home    9/14/1020. Patient remains intensive care unit. Required BiPAP all night. Chronic hypercarbia will give a break on nasal cannula reevaluate. Use noninvasive all night daytime PRN continue bronchodilators start gentle diuresis spoke to hematology will try low-dose heparin subcu for DVT prophylaxis monitor platelets . HIV pending    9/12/2020. Patient was going through very acute withdrawal yesterday very difficult to control agitation. Had to be restrained. Was taking off her BiPAP.   Currently on Precedex is tolerating BiPAP but still has hypercarbia and hypoxemia. Tolerating currently BiPAP with upgraded to to a verbs. Good tidal volume oxygenation stable patient is awake unfortunately not able to wean off Precedex. Trying to avoid high doses of Ativan due to respiratory issues. Patient has a chronic COPD. Chronic thrombocytopenia now pancytopenia I sent HIV test I consulted hematology check PVL. Fortunately not able to give heparin. However may be we can consider low-dose Arixtra for DVT prophylaxis?'s waiting for hematology input. Very. No cough patient is antibiotics for aspiration pneumonia    9/11/2020  patient remains in ICU on BIPAP  Blood culure  cardiology evaluating  echo  ABG follow up  Copd tx       The patient is critically ill and can not provide additional history due to Unable to comprehend. History taken from ed//chart    Review of Systems:  A comprehensive review of systems was negative except for that written in the HPI.       Medication Review:  · Pressors - none  · Sedation - ativan prn , precedex prn  · Antibiotics - none  · Pain - prn tylenol avoid opiates   · GI/ DVT - dvt/gi prophylaxis   · Others (other gtts) - ivf    Safety Bundles: VAP Bundle/ CAUTI/ Severe Sepsis Protocol/ Electrolyte Replacement Protocol      Allergies   Allergen Reactions    Erythromycin Hives    Penicillins Hives    Tetracycline Hives      Past Medical History:   Diagnosis Date    Chronic back pain     Diabetes ([address]     Drug abuse ([facility] [address]     Hepatic cirrhosis (HCC)     Hepatitis C     Heroin abuse ([facility] [address]     Pain management     Restless leg syndrome     Sciatica     Spleen enlarged     Thyroid disease       Past Surgical History:   Procedure Laterality Date    HX CHOLECYSTECTOMY      HX GYN      hysterectomy    HX HEENT      T&A    HX ORTHOPAEDIC      Bunion, left hand and right arm abscess removal      Social History     Tobacco Use    Smoking status: Current Every Day Smoker    Smokeless tobacco: Never Used   Substance Use Topics    Alcohol use: No      History reviewed. No pertinent family history. Prior to Admission medications    Medication Sig Start Date End Date Taking? Authorizing Provider   prazosin (MINIPRESS) 2 mg capsule Take 2 mg by mouth nightly. Yes Provider, Historical   vit A/vit C/vit E/zinc/copper (PRESERVISION AREDS PO) Take 1 Cap by mouth two (2) times a day. Yes Provider, Historical   rifAXIMin (XIFAXAN) 550 mg tablet Take 550 mg by mouth two (2) times a day. Yes Provider, Historical   zinc 50 mg tab tablet Take 50 mg by mouth daily. Yes Provider, Historical   lurasidone (Latuda) 40 mg tab tablet Take 40 mg by mouth daily (with dinner). Yes Provider, Historical   pregabalin (Lyrica) 75 mg capsule Take 75 mg by mouth two (2) times a day. Yes Provider, Historical   escitalopram oxalate (LEXAPRO) 20 mg tablet Take 10 mg by mouth daily. Yes Provider, Historical   methadone (DOLOPHINE) 10 mg/mL solution Take 65 mg by mouth daily. Yes Provider, Historical   hydrOXYzine (VISTARIL) 25 mg capsule Take 50 mg by mouth two (2) times daily as needed for Itching or Anxiety. Boo Hurt MD   levothyroxine (SYNTHROID) 50 mcg tablet Take 50 mcg by mouth Daily (before breakfast). Boo Hurt MD   rOPINIRole (REQUIP) 0.25 mg tablet Take 2 mg by mouth nightly as needed. Indications: restless legs syndrome, an extreme discomfort in the calf muscles when sitting or lying down    Boo Hurt MD   spironolactone (ALDACTONE) 25 mg tablet Take  by mouth daily. Boo Hurt MD   traZODone (DESYREL) 100 mg tablet Take 200 mg by mouth nightly.     Boo Hurt MD     Current Facility-Administered Medications   Medication Dose Route Frequency    LORazepam (ATIVAN) 2 mg/mL injection        magnesium sulfate 1 gram/100 mL IVPB premix pgbk        heparin (porcine) injection 5,000 Units  5,000 Units SubCUTAneous Q8H    methylPREDNISolone (PF) (SOLU-MEDROL) injection 20 mg  20 mg IntraVENous Q8H    furosemide (LASIX) injection 20 mg  20 mg IntraVENous DAILY    clindamycin (CLEOCIN) 600mg NS 50 mL IVPB (premix)  600 mg IntraVENous Q8H    dextrose 5% and 0.9% NaCl infusion  25 mL/hr IntraVENous CONTINUOUS    nicotine (NICODERM CQ) 14 mg/24 hr patch 1 Patch  1 Patch TransDERmal DAILY    budesonide (PULMICORT) 500 mcg/2 ml nebulizer suspension  500 mcg Nebulization BID RT    albuterol-ipratropium (DUO-NEB) 2.5 MG-0.5 MG/3 ML  3 mL Nebulization TID RT    arformoteroL (BROVANA) neb solution 15 mcg  15 mcg Nebulization BID RT    sodium chloride (NS) flush 5-40 mL  5-40 mL IntraVENous Q8H    insulin lispro (HUMALOG) injection   SubCUTAneous Q6H    pantoprazole (PROTONIX) 40 mg in 0.9% sodium chloride 10 mL injection  40 mg IntraVENous Q24H       Lines: All central lines examined by me. No signs of erythema, induration, discharge. Feeding Tube:                        Sheath:                          Infusion Wire/Catheter:      Peripherally Inserted Central Catheter:     Central Venous Catheter:     Venous Access Device:     Peripheral Intravenous Line:  Peripheral IV 09/09/20 Left Arm (Active)   Site Assessment Clean, dry, & intact 09/10/20 0400   Phlebitis Assessment 0 09/10/20 0400   Infiltration Assessment 0 09/10/20 0400   Dressing Status Clean, dry, & intact 09/10/20 0400   Dressing Type Transparent 09/10/20 0400   Hub Color/Line Status Blue 09/10/20 0400   Action Taken Blood drawn 09/09/20 1803   Alcohol Cap Used Yes 09/10/20 0400       Peripheral IV 09/09/20 Right Forearm (Active)   Site Assessment Clean, dry, & intact 09/10/20 0400   Phlebitis Assessment 0 09/10/20 0400   Infiltration Assessment 0 09/10/20 0400   Dressing Status Clean, dry, & intact 09/10/20 0400   Dressing Type Transparent 09/10/20 0400   Hub Color/Line Status Blue; Infusing 09/10/20 0400   Alcohol Cap Used Yes 09/10/20 0400                           Telemetry:normal sinus rhythm    Objective:   Vital Signs:    Visit Vitals  BP (!) 142/69   Pulse (!) 52   Temp 97.7 °F (36.5 °C)   Resp 11   Ht 5' 2\" (1.575 m)   Wt 85.4 kg (188 lb 4.4 oz)   SpO2 93%   BMI 34.44 kg/m²       O2 Device: Nasal cannula   O2 Flow Rate (L/min): 2 l/min   Temp (24hrs), Av.9 °F (36.6 °C), Min:97.2 °F (36.2 °C), Max:99.1 °F (37.3 °C)       Intake/Output:   Last shift:       07 - 1900  In: 25 [I.V.:25]  Out: -     Last 3 shifts: 1901 -  07  In: 1329.4 [I.V.:1329.4]  Out: 7911 [Urine:1035]      Intake/Output Summary (Last 24 hours) at 2020 1025  Last data filed at 2020 0800  Gross per 24 hour   Intake 866.94 ml   Output 700 ml   Net 166.94 ml       Last 3 Recorded Weights in this Encounter    09/10/20 0123 09/10/20 1451 20   Weight: 84.5 kg (186 lb 4.6 oz) 84.4 kg (186 lb) 85.4 kg (188 lb 4.4 oz)       Ventilator Settings:  Mode Rate Tidal Volume Pressure FiO2 PEEP            28 %(DECREASED)       Peak airway pressure:      Plateau pressure:     Tidal volume:    Minute ventilation: 6.9 l/min   SPO2      ARDS network Guidelines: Lung protective strategy and Plateau pressure goals less than or equal to 30      Physical Exam:     General/Neurology: Alert, Awake, moderate distress agitated  Head:   Normocephalic, without obvious abnormality, atraumatic. Eye:   PERRL EOM intact no scleral icterus, no pallor, no cyanosis  Nose:   No sinus tenderness, no erythema, no induration, no discharge  Throat:  Lips, mucosa, and tongue normal. Teeth and gums normal. No tonsillar enlargement, no erythema, no exudates. No oral thrush  Neck:   Supple, symmetric. Thyroid: no enlargement/tenderness/nodule. No carotid bruit. No lymphadenopathy. Trachea midline  Back & spine: Symmetric, no curvature. Chest wall: No tenderness or deformity. No rash  Lung: Moderate air entry bilateral equal. No rales. No rhonchi. No wheezing. No stridors. No prolongdx expiration. No accessory muscle use. No dullness on percussion. Heart:   Regular rate & rhythm. S1 S2 present. No murmur. No gallop. No click. No rub. No JVD. Abdomen/: Soft. NT. ND. +BS. No masses. No organomegaly. Extremities:  No pedal edema. No cyanosis. No clubbing  Pulses: 2+ and symmetric in DP  Lymphatic:  No cervical, supraclavicular or axillary palpable lymphadenopathy. Musculoskeletal: No joint swelling. No tenderness. Skin:   Color, texture, turgor normal. No rashes or lesions        Data:       Recent Results (from the past 24 hour(s))   POC G3    Collection Time: 09/12/20 11:36 AM   Result Value Ref Range    Device: BIPAP      FIO2 (POC) 40 %    pH (POC) 7.28 (L) 7.35 - 7.45      pCO2 (POC) 60.9 (H) 35.0 - 45.0 MMHG    pO2 (POC) 41 (LL) 80 - 100 MMHG    HCO3 (POC) 28.6 (H) 22 - 26 MMOL/L    sO2 (POC) 68 (L) 92 - 97 %    Base excess (POC) 2 mmol/L    PEEP/CPAP (POC) 8.0 cmH2O    PIP (POC) 34      Allens test (POC) YES      Total resp. rate 21      Site LEFT RADIAL      Specimen type (POC) ARTERIAL      Performed by Carlos Covarrubias     AVAPS YES     GLUCOSE, POC    Collection Time: 09/12/20 12:06 PM   Result Value Ref Range    Glucose (POC) 126 (H) 70 - 110 mg/dL   POC G3    Collection Time: 09/12/20  1:18 PM   Result Value Ref Range    Device: BIPAP      FIO2 (POC) 40 %    pH (POC) 7.36 7.35 - 7.45      pCO2 (POC) 47.0 (H) 35.0 - 45.0 MMHG    pO2 (POC) 142 (H) 80 - 100 MMHG    HCO3 (POC) 26.8 (H) 22 - 26 MMOL/L    sO2 (POC) 99 (H) 92 - 97 %    Base excess (POC) 1 mmol/L    PEEP/CPAP (POC) 6.0 cmH2O    PIP (POC) 18      Allens test (POC) YES      Total resp.  rate 18      Site RIGHT BRACHIAL      Specimen type (POC) ARTERIAL      Performed by Carlos Covarrubias     Spontaneous timed YES     GLUCOSE, POC    Collection Time: 09/12/20  6:01 PM   Result Value Ref Range    Glucose (POC) 172 (H) 70 - 110 mg/dL   GLUCOSE, POC    Collection Time: 09/12/20 11:10 PM   Result Value Ref Range    Glucose (POC) 128 (H) 70 - 110 mg/dL   MAGNESIUM    Collection Time: 09/13/20  4:20 AM   Result Value Ref Range Magnesium 1.8 1.6 - 2.6 mg/dL   CALCIUM, IONIZED    Collection Time: 09/13/20  4:20 AM   Result Value Ref Range    Ionized Calcium 1.24 1.12 - 1.32 MMOL/L   PHOSPHORUS    Collection Time: 09/13/20  4:20 AM   Result Value Ref Range    Phosphorus 2.6 2.5 - 4.9 MG/DL   CBC WITH AUTOMATED DIFF    Collection Time: 09/13/20  4:20 AM   Result Value Ref Range    WBC 1.4 (L) 4.6 - 13.2 K/uL    RBC 3.63 (L) 4.20 - 5.30 M/uL    HGB 9.7 (L) 12.0 - 16.0 g/dL    HCT 30.7 (L) 35.0 - 45.0 %    MCV 84.6 74.0 - 97.0 FL    MCH 26.7 24.0 - 34.0 PG    MCHC 31.6 31.0 - 37.0 g/dL    RDW 15.3 (H) 11.6 - 14.5 %    PLATELET 49 (L) 713 - 420 K/uL    MPV 9.9 9.2 - 11.8 FL    NEUTROPHILS 84 (H) 40 - 73 %    LYMPHOCYTES 11 (L) 21 - 52 %    MONOCYTES 4 3 - 10 %    EOSINOPHILS 1 0 - 5 %    BASOPHILS 0 0 - 2 %    ABS. NEUTROPHILS 1.2 (L) 1.8 - 8.0 K/UL    ABS. LYMPHOCYTES 0.2 (L) 0.9 - 3.6 K/UL    ABS. MONOCYTES 0.1 0.05 - 1.2 K/UL    ABS. EOSINOPHILS 0.0 0.0 - 0.4 K/UL    ABS. BASOPHILS 0.0 0.0 - 0.1 K/UL    DF AUTOMATED     METABOLIC PANEL, COMPREHENSIVE    Collection Time: 09/13/20  4:20 AM   Result Value Ref Range    Sodium 142 136 - 145 mmol/L    Potassium 4.5 3.5 - 5.5 mmol/L    Chloride 109 100 - 111 mmol/L    CO2 28 21 - 32 mmol/L    Anion gap 5 3.0 - 18 mmol/L    Glucose 134 (H) 74 - 99 mg/dL    BUN 24 (H) 7.0 - 18 MG/DL    Creatinine 0.79 0.6 - 1.3 MG/DL    BUN/Creatinine ratio 30 (H) 12 - 20      GFR est AA >60 >60 ml/min/1.73m2    GFR est non-AA >60 >60 ml/min/1.73m2    Calcium 8.8 8.5 - 10.1 MG/DL    Bilirubin, total 0.6 0.2 - 1.0 MG/DL    ALT (SGPT) 43 13 - 56 U/L    AST (SGOT) 104 (H) 10 - 38 U/L    Alk.  phosphatase 92 45 - 117 U/L    Protein, total 6.1 (L) 6.4 - 8.2 g/dL    Albumin 3.0 (L) 3.4 - 5.0 g/dL    Globulin 3.1 2.0 - 4.0 g/dL    A-G Ratio 1.0 0.8 - 1.7     PROTHROMBIN TIME + INR    Collection Time: 09/13/20  4:20 AM   Result Value Ref Range    Prothrombin time 17.4 (H) 11.5 - 15.2 sec    INR 1.5 (H) 0.8 - 1.2     GLUCOSE, POC    Collection Time: 09/13/20  5:23 AM   Result Value Ref Range    Glucose (POC) 149 (H) 70 - 110 mg/dL   POC G3    Collection Time: 09/13/20  5:54 AM   Result Value Ref Range    Device: BIPAP      FIO2 (POC) 0.30 %    pH (POC) 7.37 7.35 - 7.45      pCO2 (POC) 53.8 (H) 35.0 - 45.0 MMHG    pO2 (POC) 91 80 - 100 MMHG    HCO3 (POC) 30.7 (H) 22 - 26 MMOL/L    sO2 (POC) 96 92 - 97 %    Base excess (POC) 5 mmol/L    PEEP/CPAP (POC) 6 cmH2O    PIP (POC) 16      Allens test (POC) YES      Total resp. rate 18      Site LEFT RADIAL      Patient temp. 99.1      Specimen type (POC) ARTERIAL      Performed by Dellie Cuff          Chemistry Recent Labs     09/13/20  0420 09/12/20  0441 09/11/20  0420   * 110* 65*    143 142   K 4.5 4.4 4.7    111 110   CO2 28 27 28   BUN 24* 24* 23*   CREA 0.79 0.82 0.87   CA 8.8 9.1 8.8   MG 1.8 2.0 2.2   PHOS 2.6 2.5 3.0   AGAP 5 5 4   BUCR 30* 29* 26*   AP 92 94 111   TP 6.1* 6.1* 6.7   ALB 3.0* 3.1* 3.3*   GLOB 3.1 3.0 3.4   AGRAT 1.0 1.0 1.0        Lactic Acid No results found for: LAC  No results for input(s): LAC in the last 72 hours. Liver Enzymes Protein, total   Date Value Ref Range Status   09/13/2020 6.1 (L) 6.4 - 8.2 g/dL Final     Albumin   Date Value Ref Range Status   09/13/2020 3.0 (L) 3.4 - 5.0 g/dL Final     Globulin   Date Value Ref Range Status   09/13/2020 3.1 2.0 - 4.0 g/dL Final     A-G Ratio   Date Value Ref Range Status   09/13/2020 1.0 0.8 - 1.7   Final     Alk.  phosphatase   Date Value Ref Range Status   09/13/2020 92 45 - 117 U/L Final     Recent Labs     09/13/20  0420 09/12/20  0441 09/11/20  0420   TP 6.1* 6.1* 6.7   ALB 3.0* 3.1* 3.3*   GLOB 3.1 3.0 3.4   AGRAT 1.0 1.0 1.0   AP 92 94 111        CBC w/Diff Recent Labs     09/13/20  0420 09/12/20  0441 09/11/20  0420   WBC 1.4* 1.4* 4.1*   RBC 3.63* 3.62* 4.36   HGB 9.7* 9.8* 11.9*   HCT 30.7* 30.7* 37.5   PLT 49* 38* 50*   GRANS 84* 62 60   LYMPH 11* 28 25   EOS 1 0 1 Cardiac Enzymes No results found for: CPK, CK, CKMMB, CKMB, RCK3, CKMBT, CKNDX, CKND1, NYASIA, TROPT, TROIQ, JARED, TROPT, TNIPOC, BNP, BNPP     BNP No results found for: BNP, BNPP, XBNPT     Coagulation Recent Labs     09/13/20  0420 09/12/20  0940   PTP 17.4* 17.4*   INR 1.5* 1.5*         Thyroid  Lab Results   Component Value Date/Time    TSH 2.38 09/12/2020 04:41 AM       No results found for: T4     Lipid Panel No results found for: CHOL, CHOLPOCT, CHOLX, CHLST, CHOLV, 524364, HDL, HDLP, LDL, LDLC, DLDLP, 134426, VLDLC, VLDL, Dave Haagensen, TGLPOCT, CHHD, CHHDX     ABG  Recent Labs     09/13/20  0554 09/12/20  1318 09/12/20  1136   PHI 7.37 7.36 7.28*   PCO2I 53.8* 47.0* 60.9*   PO2I 91 142* 41*   HCO3I 30.7* 26.8* 28.6*   FIO2I 0.30 40 40        Urinalysis  Lab Results   Component Value Date/Time    Color DARK YELLOW 09/09/2020 04:30 PM    Appearance CLOUDY 09/09/2020 04:30 PM    Specific gravity 1.020 09/09/2020 04:30 PM    pH (UA) 6.0 09/09/2020 04:30 PM    Protein TRACE (A) 09/09/2020 04:30 PM    Glucose Negative 09/09/2020 04:30 PM    Ketone Negative 09/09/2020 04:30 PM    Bilirubin Negative 09/09/2020 04:30 PM    Urobilinogen 1.0 09/09/2020 04:30 PM    Nitrites Negative 09/09/2020 04:30 PM    Leukocyte Esterase Negative 09/09/2020 04:30 PM    Epithelial cells 2+ 09/09/2020 04:30 PM    Bacteria FEW (A) 09/09/2020 04:30 PM    WBC 0 to 3 09/09/2020 04:30 PM    RBC 0 to 2 09/09/2020 04:30 PM        Micro   Recent Labs     09/11/20  1010 09/11/20  0955   CULT NO GROWTH 2 DAYS NO GROWTH 2 DAYS     Recent Labs     09/11/20  1010 09/11/20  0955   CULT NO GROWTH 2 DAYS NO GROWTH 2 DAYS        EKG  No results found for this or any previous visit. PFT  No flowsheet data found.      Other  ASA reactivity:   Pre-albumin:   Ionized Calcium:   NH4:   T3, FT4:  Cortisol:  Urine Osm:  Urine Lytes:   HbA1c:      Ultrasound       LE Doppler       ECHO       CT (Most Recent)  [facility] [place] [facility]   CT HEAD WO CONT    Narrative _______________    EXAM: CT HEAD WO CONT.  _______________    CLINICAL INDICATION/HISTORY: Bradycardia and neuro fluctuations.  -Additional: None. COMPARISON: CT of 09/09/2020.  _______________    TECHNIQUE/COMPARISON: Nonenhanced CT examination of the head was performed. Sagittal and coronal series were reformatted from the axial source images. One or more dose reduction techniques were used on this CT: automated exposure  control, adjustment of the mAs and/or kVp according to patient size, and  iterative reconstruction techniques. The specific techniques used on this CT  exam have been documented in the patient's electronic medical record. Digital  Imaging and Communications in Medicine (DICOM) format image data are available  to nonaffiliated external healthcare facilities or entities on a secure, media  free, reciprocally searchable basis with patient authorization for at least a  12-month period after this study. _______________    FINDINGS:    BRAIN AND POSTERIOR FOSSA: There is no evidence of acute vascular territorial  ischemia, intracranial hemorrhage, midline shift or mass effect. Periventricular  and subcortical white matter hypoattenuation is most compatible with chronic  microangiopathy. Mild ventricular and sulcal prominence represents age-related  involutional changes and volume loss. EXTRA-AXIAL SPACES: There are no abnormal extra-axial fluid collections. CALVARIA AND SOFT TISSUES: No acute calvarial or extracalvarial soft tissue  findings of concern. OTHER: The visualized paranasal sinuses are essentially clear, as are the  mastoid air cells bilaterally. _______________      Impression IMPRESSION:    No acute findings or appreciable change from 09/09/2020.    _______________             XR (Most Recent).  CXR reviewed by me and compared with previous CXR Results from Hospital Encounter encounter on 09/09/20   XR CHEST PORT    Narrative EXAM: Portable chest radiograph 9/12/2020    CLINICAL INDICATION/HISTORY: Hypoxia. TECHNIQUE: A semierect portable radiograph was obtained. COMPARISON: 9/9/2020. FINDINGS: The cardiac and mediastinal contours are within normal limits. There  is mild left infrahilar atelectasis or scar. Underlying infiltrate cannot be  excluded. The lungs are otherwise clear. There is no pneumothorax or pleural  effusion. Impression IMPRESSION:     1. Mild left infrahilar atelectasis, scar, and/or infiltrate. High complexity decision making was performed during the evaluation of this patient at high risk for decompensation with multiple organ involvement    [x] Above mentioned total time spent on reviewing the case/medical record/data/notes/EMR/patient examination/documentation/coordinating care with nurse/consultants, exclusive of procedures with complex decision making performed and > 50% time spent in face to face evaluation.     Cc 40 minutes   Ronnell Kussmaul, MD  9/13/2020

## 2020-09-13 NOTE — PROGRESS NOTES
Per Nurse Alberto Valentine pt is not appropriate for PT eval.  MD PT eval order from 09/09/2020 and pt has since been not appropriate due to medical instability. D/C PT eval at this time. Please re-order PT eval once pt is medically appropriate. Thank you.

## 2020-09-13 NOTE — PROGRESS NOTES
Comprehensive Nutrition Assessment    Type and Reason for Visit: (P) Initial, NPO/clear liquid    Nutrition Recommendations/Plan: Diet: Adv diet per MD; avoid prolonged NPO diet order as it does not meet estimated needs     Nutrition Assessment:  (P) pt admitted w/ Acute respiratory failure with  Hypercarbia and hypoxia, methadone overdose, bradycardia, acute encephalopahty, LIONEL-resolved, anemia, thrombocytopenia,      Estimated Daily Nutrient Needs:  Energy (kcal):  (P) 1597-  Protein (g):  (P) 30-40       Fluid (ml/day):  (P) 1597    Nutrition Related Findings:  (P) Labs reviewed; Meds reviewed-on steriods and D5% @ 25ml/hr No BM and day 3 NPO at this time      Wounds:  none          Current Nutrition Therapies:   DIET NPO    Anthropometric Measures:  Height:  (P) 5' 2\" (157.5 cm)  Current Body Wt:  (P) 85.3 kg (188 lb)   Admission Body Wt:       Usual Body Wt:  (P) 178 lb(8/23/20)     Ideal Body Wt:  (P) 110 lbs:  (P) 170.9 %   Adjusted Body Weight:   ; Weight Adjustment for:     Adjusted BMI:       BMI Category:  (P) Obese class 1 (BMI 30.0-34. 9)       Nutrition Diagnosis:   (P) Inadequate oral intake related to (P) cognitive or neurological impairment as evidenced by (P) NPO or clear liquid status due to medical condition    Nutrition Interventions:   Food and/or Nutrient Delivery: (P) Start oral diet  Nutrition Education and Counseling: (P) No recommendations at this time  Coordination of Nutrition Care: (P) Continued inpatient monitoring    Goals:  (P) Adv diet per MD in the next 1-3days       Nutrition Monitoring and Evaluation:   Behavioral-Environmental Outcomes:    Food/Nutrient Intake Outcomes: (P) Diet advancement/tolerance  Physical Signs/Symptoms Outcomes: (P) Biochemical data, Weight, Skin, GI status    Discharge Planning:    (P) Too soon to determine     Electronically signed by Brando David on 9/13/2020 at 8:19 AM

## 2020-09-13 NOTE — PROGRESS NOTES
I was paged by ICU nurse for bradycardia. Heart rate was 29-30 bpm.  Blood pressure on higher side. I advised to do 12 ead EKG and head CT due to altered mental status. EKG revealed Sinus bradycardia, VR 35 bpm, no ST T changes. TSH   Advised about head CT and look for metabolic cause. Continue monitoring in ICU. Case discussed with .

## 2020-09-14 ENCOUNTER — APPOINTMENT (OUTPATIENT)
Dept: MRI IMAGING | Age: 70
DRG: 917 | End: 2020-09-14
Attending: HOSPITALIST
Payer: MEDICARE

## 2020-09-14 LAB
ALBUMIN SERPL-MCNC: 3.2 G/DL (ref 3.4–5)
ALBUMIN/GLOB SERPL: 1 {RATIO} (ref 0.8–1.7)
ALP SERPL-CCNC: 85 U/L (ref 45–117)
ALT SERPL-CCNC: 45 U/L (ref 13–56)
ANION GAP SERPL CALC-SCNC: 4 MMOL/L (ref 3–18)
AST SERPL-CCNC: 73 U/L (ref 10–38)
BASOPHILS # BLD: 0 K/UL (ref 0–0.1)
BASOPHILS NFR BLD: 0 % (ref 0–2)
BILIRUB SERPL-MCNC: 0.6 MG/DL (ref 0.2–1)
BUN SERPL-MCNC: 32 MG/DL (ref 7–18)
BUN/CREAT SERPL: 40 (ref 12–20)
CA-I SERPL-SCNC: 1.24 MMOL/L (ref 1.12–1.32)
CALCIUM SERPL-MCNC: 9.3 MG/DL (ref 8.5–10.1)
CHLORIDE SERPL-SCNC: 108 MMOL/L (ref 100–111)
CO2 SERPL-SCNC: 31 MMOL/L (ref 21–32)
CREAT SERPL-MCNC: 0.8 MG/DL (ref 0.6–1.3)
DIFFERENTIAL METHOD BLD: ABNORMAL
EOSINOPHIL # BLD: 0 K/UL (ref 0–0.4)
EOSINOPHIL NFR BLD: 0 % (ref 0–5)
ERYTHROCYTE [DISTWIDTH] IN BLOOD BY AUTOMATED COUNT: 15.5 % (ref 11.6–14.5)
GLOBULIN SER CALC-MCNC: 3.2 G/DL (ref 2–4)
GLUCOSE BLD STRIP.AUTO-MCNC: 110 MG/DL (ref 70–110)
GLUCOSE BLD STRIP.AUTO-MCNC: 123 MG/DL (ref 70–110)
GLUCOSE BLD STRIP.AUTO-MCNC: 128 MG/DL (ref 70–110)
GLUCOSE SERPL-MCNC: 109 MG/DL (ref 74–99)
HCT VFR BLD AUTO: 31.8 % (ref 35–45)
HCV AB SER IA-ACNC: >11 INDEX
HCV AB SERPL QL IA: POSITIVE
HCV COMMENT,HCGAC: ABNORMAL
HGB BLD-MCNC: 10.4 G/DL (ref 12–16)
HIV 1+2 AB+HIV1 P24 AG SERPL QL IA: NONREACTIVE
HIV12 RESULT COMMENT, HHIVC: NORMAL
INR PPP: 1.5 (ref 0.8–1.2)
LYMPHOCYTES # BLD: 0.3 K/UL (ref 0.9–3.6)
LYMPHOCYTES NFR BLD: 15 % (ref 21–52)
MAGNESIUM SERPL-MCNC: 2.3 MG/DL (ref 1.6–2.6)
MCH RBC QN AUTO: 27.2 PG (ref 24–34)
MCHC RBC AUTO-ENTMCNC: 32.7 G/DL (ref 31–37)
MCV RBC AUTO: 83 FL (ref 74–97)
MONOCYTES # BLD: 0.1 K/UL (ref 0.05–1.2)
MONOCYTES NFR BLD: 6 % (ref 3–10)
NEUTS SEG # BLD: 1.5 K/UL (ref 1.8–8)
NEUTS SEG NFR BLD: 79 % (ref 40–73)
PHOSPHATE SERPL-MCNC: 2.5 MG/DL (ref 2.5–4.9)
PLATELET # BLD AUTO: 51 K/UL (ref 135–420)
PMV BLD AUTO: 10 FL (ref 9.2–11.8)
POTASSIUM SERPL-SCNC: 4.2 MMOL/L (ref 3.5–5.5)
PROT SERPL-MCNC: 6.4 G/DL (ref 6.4–8.2)
PROTHROMBIN TIME: 18.1 SEC (ref 11.5–15.2)
RBC # BLD AUTO: 3.83 M/UL (ref 4.2–5.3)
SODIUM SERPL-SCNC: 143 MMOL/L (ref 136–145)
WBC # BLD AUTO: 1.8 K/UL (ref 4.6–13.2)

## 2020-09-14 PROCEDURE — 74011000250 HC RX REV CODE- 250: Performed by: HOSPITALIST

## 2020-09-14 PROCEDURE — 74011250636 HC RX REV CODE- 250/636: Performed by: FAMILY MEDICINE

## 2020-09-14 PROCEDURE — 85610 PROTHROMBIN TIME: CPT

## 2020-09-14 PROCEDURE — 85025 COMPLETE CBC W/AUTO DIFF WBC: CPT

## 2020-09-14 PROCEDURE — 74011250636 HC RX REV CODE- 250/636: Performed by: HOSPITALIST

## 2020-09-14 PROCEDURE — 74011250637 HC RX REV CODE- 250/637: Performed by: INTERNAL MEDICINE

## 2020-09-14 PROCEDURE — 92610 EVALUATE SWALLOWING FUNCTION: CPT

## 2020-09-14 PROCEDURE — 74011250637 HC RX REV CODE- 250/637: Performed by: FAMILY MEDICINE

## 2020-09-14 PROCEDURE — 77010033678 HC OXYGEN DAILY

## 2020-09-14 PROCEDURE — 65610000006 HC RM INTENSIVE CARE

## 2020-09-14 PROCEDURE — C9113 INJ PANTOPRAZOLE SODIUM, VIA: HCPCS | Performed by: HOSPITALIST

## 2020-09-14 PROCEDURE — 94660 CPAP INITIATION&MGMT: CPT

## 2020-09-14 PROCEDURE — 74011000250 HC RX REV CODE- 250: Performed by: INTERNAL MEDICINE

## 2020-09-14 PROCEDURE — 80053 COMPREHEN METABOLIC PANEL: CPT

## 2020-09-14 PROCEDURE — 94640 AIRWAY INHALATION TREATMENT: CPT

## 2020-09-14 PROCEDURE — 74011250636 HC RX REV CODE- 250/636: Performed by: INTERNAL MEDICINE

## 2020-09-14 PROCEDURE — 84100 ASSAY OF PHOSPHORUS: CPT

## 2020-09-14 PROCEDURE — 36415 COLL VENOUS BLD VENIPUNCTURE: CPT

## 2020-09-14 PROCEDURE — 83735 ASSAY OF MAGNESIUM: CPT

## 2020-09-14 PROCEDURE — 74011000258 HC RX REV CODE- 258: Performed by: INTERNAL MEDICINE

## 2020-09-14 PROCEDURE — 82962 GLUCOSE BLOOD TEST: CPT

## 2020-09-14 PROCEDURE — 82330 ASSAY OF CALCIUM: CPT

## 2020-09-14 RX ORDER — CLONIDINE HYDROCHLORIDE 0.1 MG/1
0.1 TABLET ORAL
Status: DISCONTINUED | OUTPATIENT
Start: 2020-09-14 | End: 2020-09-18 | Stop reason: HOSPADM

## 2020-09-14 RX ORDER — METHADONE HYDROCHLORIDE 10 MG/1
30 TABLET ORAL
Status: DISCONTINUED | OUTPATIENT
Start: 2020-09-14 | End: 2020-09-15

## 2020-09-14 RX ORDER — MORPHINE SULFATE 2 MG/ML
2 INJECTION, SOLUTION INTRAMUSCULAR; INTRAVENOUS
Status: DISCONTINUED | OUTPATIENT
Start: 2020-09-14 | End: 2020-09-18 | Stop reason: HOSPADM

## 2020-09-14 RX ADMIN — METHYLPREDNISOLONE SODIUM SUCCINATE 20 MG: 40 INJECTION, POWDER, FOR SOLUTION INTRAMUSCULAR; INTRAVENOUS at 13:08

## 2020-09-14 RX ADMIN — Medication 10 ML: at 21:13

## 2020-09-14 RX ADMIN — BUDESONIDE 500 MCG: 0.5 INHALANT RESPIRATORY (INHALATION) at 07:23

## 2020-09-14 RX ADMIN — CLINDAMYCIN PHOSPHATE 600 MG: 600 INJECTION, SOLUTION INTRAVENOUS at 08:46

## 2020-09-14 RX ADMIN — ARFORMOTEROL TARTRATE 15 MCG: 15 SOLUTION RESPIRATORY (INHALATION) at 07:23

## 2020-09-14 RX ADMIN — Medication 10 ML: at 05:25

## 2020-09-14 RX ADMIN — BUDESONIDE 500 MCG: 0.5 INHALANT RESPIRATORY (INHALATION) at 00:00

## 2020-09-14 RX ADMIN — MORPHINE SULFATE 2 MG: 2 INJECTION, SOLUTION INTRAMUSCULAR; INTRAVENOUS at 19:59

## 2020-09-14 RX ADMIN — Medication 10 ML: at 13:09

## 2020-09-14 RX ADMIN — HEPARIN SODIUM 5000 UNITS: 5000 INJECTION INTRAVENOUS; SUBCUTANEOUS at 17:44

## 2020-09-14 RX ADMIN — METHADONE HYDROCHLORIDE 30 MG: 10 TABLET ORAL at 21:52

## 2020-09-14 RX ADMIN — CLINDAMYCIN PHOSPHATE 600 MG: 600 INJECTION, SOLUTION INTRAVENOUS at 02:45

## 2020-09-14 RX ADMIN — LORAZEPAM 1 MG: 2 INJECTION INTRAMUSCULAR; INTRAVENOUS at 17:43

## 2020-09-14 RX ADMIN — LORAZEPAM 1 MG: 2 INJECTION INTRAMUSCULAR; INTRAVENOUS at 21:53

## 2020-09-14 RX ADMIN — METHYLPREDNISOLONE SODIUM SUCCINATE 20 MG: 40 INJECTION, POWDER, FOR SOLUTION INTRAMUSCULAR; INTRAVENOUS at 21:14

## 2020-09-14 RX ADMIN — SODIUM CHLORIDE 40 MG: 9 INJECTION, SOLUTION INTRAMUSCULAR; INTRAVENOUS; SUBCUTANEOUS at 21:14

## 2020-09-14 RX ADMIN — ARFORMOTEROL TARTRATE 15 MCG: 15 SOLUTION RESPIRATORY (INHALATION) at 19:24

## 2020-09-14 RX ADMIN — HEPARIN SODIUM 5000 UNITS: 5000 INJECTION INTRAVENOUS; SUBCUTANEOUS at 08:45

## 2020-09-14 RX ADMIN — CLONIDINE HYDROCHLORIDE 0.1 MG: 0.1 TABLET ORAL at 21:18

## 2020-09-14 RX ADMIN — LORAZEPAM 1 MG: 2 INJECTION INTRAMUSCULAR; INTRAVENOUS at 19:40

## 2020-09-14 RX ADMIN — CLINDAMYCIN PHOSPHATE 600 MG: 600 INJECTION, SOLUTION INTRAVENOUS at 17:44

## 2020-09-14 RX ADMIN — FUROSEMIDE 20 MG: 10 INJECTION, SOLUTION INTRAMUSCULAR; INTRAVENOUS at 08:46

## 2020-09-14 RX ADMIN — IPRATROPIUM BROMIDE AND ALBUTEROL SULFATE 3 ML: .5; 3 SOLUTION RESPIRATORY (INHALATION) at 13:43

## 2020-09-14 RX ADMIN — METHYLPREDNISOLONE SODIUM SUCCINATE 20 MG: 40 INJECTION, POWDER, FOR SOLUTION INTRAMUSCULAR; INTRAVENOUS at 05:25

## 2020-09-14 RX ADMIN — PROMETHAZINE HYDROCHLORIDE 12.5 MG: 25 INJECTION, SOLUTION INTRAMUSCULAR; INTRAVENOUS at 19:51

## 2020-09-14 RX ADMIN — IPRATROPIUM BROMIDE AND ALBUTEROL SULFATE 3 ML: .5; 3 SOLUTION RESPIRATORY (INHALATION) at 07:23

## 2020-09-14 RX ADMIN — LORAZEPAM 1 MG: 2 INJECTION INTRAMUSCULAR; INTRAVENOUS at 05:47

## 2020-09-14 RX ADMIN — IPRATROPIUM BROMIDE AND ALBUTEROL SULFATE 3 ML: .5; 3 SOLUTION RESPIRATORY (INHALATION) at 19:24

## 2020-09-14 NOTE — PROGRESS NOTES
Hospitalist Progress Note-critical care note     Patient: Maryanne Vines MRN: [id number]  CSN: [id number]    YOB: 1950  Age: 72 y.o.   Sex: female    DOA: 9/9/2020 LOS:  LOS: 5 days            Chief complaint: Methadone overuse hypoxia, prolonged QT bradycardia encephalopathy    Assessment/Plan         Hospital Problems  Never Reviewed          Codes Class Noted POA    Diastolic congestive heart failure (HCC) ICD-10-CM: I50.30  ICD-9-CM: 428.30, 428.0  9/13/2020 Yes        Nonrheumatic mitral valve regurgitation ICD-10-CM: I34.0  ICD-9-CM: 424.0  9/13/2020 Yes        Hepatosplenomegaly ICD-10-CM: R16.2  ICD-9-CM: 571.8  9/13/2020 Yes        Pancytopenia ([facility] ICD-10-CM: A18.730  ICD-9-CM: 284.19  9/12/2020 Yes        Abnormal echocardiogram ICD-10-CM: R93.1  ICD-9-CM: 793.2  9/11/2020 Yes        Acute on chronic respiratory failure with hypoxia and hypercapnia (HCC) ICD-10-CM: J96.21, J96.22  ICD-9-CM: 518.84, 786.09, 799.02  9/11/2020 Yes        Thrombocytopenia ([facility] ICD-10-CM: D69.6  ICD-9-CM: 287.5  9/10/2020 Yes        Hypercarbia ICD-10-CM: R06.89  ICD-9-CM: 786.09  9/9/2020 Yes        Hypoxia ICD-10-CM: R09.02  ICD-9-CM: 799.02  9/9/2020 Yes        * (Principal) Methadone overdose ([facility] ICD-10-CM: T40.3X1A  ICD-9-CM: 965.02, E980.0  9/9/2020 Yes        LIONEL (acute kidney injury) ([facility] ICD-10-CM: N17.9  ICD-9-CM: 584.9  9/9/2020 Yes        Prolonged Q-T interval on ECG ICD-10-CM: R94.31  ICD-9-CM: 794.31  9/9/2020 Yes        Bradycardia ICD-10-CM: R00.1  ICD-9-CM: 427.89  9/9/2020 Yes        Encephalopathy acute ICD-10-CM: G93.40  ICD-9-CM: 348.30  9/9/2020 Yes              Acute respiratory failure with  Hypercarbia and hypoxia   Off bipap at day time, continue weaning off bipap and nc O2   Due to drug overdose  Still not weaning off bipap          Prolong Q-T and bradycardia -like due side effect from methadone  Cardiologist on board   Planning ADRIANNA for possible valve vegetation Methadone overdose   Alert and oriented today, will recall psych for evaluation-case discussed with Dr. Cornelia Shahid   Cardiac monitoring ,   Optimize electrolytes   Narcan as needed  Sitter, si precaution       Acute encephalopathy  Alert and follow the command   Ct head no acute issue on admission  mri brain ordered-not  done       [facility]   Resolved    continue replace electrolytes as needed       DM type II   Ssi        Anemia and thrombocytopenia   So far stable   Hematologist on board       Hx of cirrhosis   Ammonia level was wnl     Subjective: feel better     rn :off bipap at day time   ,  Disposition :tbd,   Review of systems:  General: no f/c   Lung: sob is better, no wheezing  Heart : no chest pain, no palpitation   Gi :no n/v     Vital signs/Intake and Output:  Visit Vitals  BP (!) 168/71   Pulse (!) 51   Temp 97.6 °F (36.4 °C)   Resp 20   Ht 5' 2\" (1.575 m)   Wt 83.9 kg (184 lb 15.5 oz)   SpO2 94%   BMI 33.83 kg/m²     Current Shift:  No intake/output data recorded. Last three shifts:  09/12 1901 - 09/14 0700  In: 1300 [I.V.:1300]  Out: 1700 [Urine:1700]    Physical Exam:  General: WD, WN. No acute distress   HEENT: NC, Atraumatic. PERRLA, anicteric sclerae. Breathing mask noted    Lungs: CTA Bilaterally. No Wheezing/Rhonchi/Rales. Heart:  Hari ,  No murmur, No Rubs, No Gallops  Abdomen: Soft, Non distended, Non tender. +Bowel sounds,   Extremities: No c/c/e  Psych:   Not anxious or agitated.   Neurologic:   No acute neuro deficit, tremor noted           Labs: Results:       Chemistry Recent Labs     09/14/20  0520 09/13/20  0420 09/12/20  0441   * 134* 110*    142 143   K 4.2 4.5 4.4    109 111   CO2 31 28 27   BUN 32* 24* 24*   CREA 0.80 0.79 0.82   CA 9.3 8.8 9.1   AGAP 4 5 5   BUCR 40* 30* 29*   AP 85 92 94   TP 6.4 6.1* 6.1*   ALB 3.2* 3.0* 3.1*   GLOB 3.2 3.1 3.0   AGRAT 1.0 1.0 1.0      CBC w/Diff Recent Labs     09/14/20  0520 09/13/20  0420 09/12/20  0441   WBC 1.8* 1.4* 1.4*   RBC 3.83* 3.63* 3.62*   HGB 10.4* 9.7* 9.8*   HCT 31.8* 30.7* 30.7*   PLT 51* 49* 38*   GRANS 79* 84* 62   LYMPH 15* 11* 28   EOS 0 1 0      Cardiac Enzymes Recent Labs     09/12/20  0940   CPK 3,755*   CKND1 0.2      Coagulation Recent Labs     09/14/20  0520 09/13/20  0420   PTP 18.1* 17.4*   INR 1.5* 1.5*       Lipid Panel No results found for: CHOL, CHOLPOCT, CHOLX, CHLST, CHOLV, 644323, HDL, HDLP, LDL, LDLC, DLDLP, 158676, VLDLC, VLDL, TGLX, TRIGL, TRIGP, TGLPOCT, CHHD, CHHDX   BNP No results for input(s): BNPP in the last 72 hours. Liver Enzymes Recent Labs     09/14/20  0520   TP 6.4   ALB 3.2*   AP 85      Thyroid Studies Lab Results   Component Value Date/Time    TSH 2.38 09/12/2020 04:41 AM        Procedures/imaging: see electronic medical records for all procedures/Xrays and details which were not copied into this note but were reviewed prior to creation of Plan    Ct Head Wo Cont    Result Date: 9/9/2020  EXAM: CT HEAD WO CONT CLINICAL INDICATION/HISTORY: ams COMPARISON: None. TECHNIQUE: Axial CT imaging of the head was performed without intravenous contrast. Sagittal and coronal reconstructions are provided. One or more dose reduction techniques were used on this CT: automated exposure control, adjustment of the mAs and/or kVp according to patient size, and iterative reconstruction techniques. The specific techniques used on this CT exam have been documented in the patient's electronic medical record. Digital Imaging and Communications in Medicine (DICOM) format image data are available to nonaffiliated external healthcare facilities or entities on a secure, media free, reciprocally searchable basis with patient authorization for at least a 12-month period after this study _______________ FINDINGS: BRAIN AND POSTERIOR FOSSA: The sulci, folia, ventricles and basal cisterns are within normal limits for the patient's age. There is no intracranial hemorrhage, mass effect, or midline shift. There are scattered and confluent foci of decreased attenuation in the periventricular white matter which are nonspecific but most likely sequelae of chronic microvascular disease. EXTRA-AXIAL SPACES AND MENINGES: There are no abnormal extra-axial fluid collections. CALVARIUM: Intact. SINUSES: Clear. OTHER: Prior bilateral lens replacements. _______________     IMPRESSION: No acute intracranial abnormalities. Xr Chest Port    Result Date: 9/9/2020  EXAM: XR CHEST PORT CLINICAL INDICATION/HISTORY: OD   > Additional: Altered mental status COMPARISON: Correlation with rib series dated August 23, 2020 TECHNIQUE: Portable chest FINDINGS: SUPPORT DEVICES: None. HEART AND MEDIASTINUM: Normal size and contour. Normal pulmonary vasculature. LUNGS AND PLEURAL SPACES: Thin band of subsegmental atelectasis or scarring at the right lung base, unchanged. The lungs are otherwise well expanded and clear. No focal consolidation, effusion, or pneumothorax. BONY THORAX AND SOFT TISSUES: No acute osseous abnormality. IMPRESSION: No active cardiopulmonary disease. Xr Ribs Rt W Pa Cxr Min 3 V    Result Date: 8/23/2020  EXAM: Frontal view of the chest and 3 views of the right ribs CLINICAL INDICATION/HISTORY: Right rib pain COMPARISON: Chest radiograph dated 7/20/2011  FINDINGS: Linear opacity identified at the right lung base. No pleural effusion or pneumothorax identified. Questionable subtle cortical irregularity involving the lateral eighth and possibly ninth ribs. No other displaced rib fractures identified. IMPRESSION: 1. Equivocal findings of nondisplaced lateral right eighth and ninth rib fractures. No other rib fractures identified. 2. Linear atelectasis at the right lung base. No pleural effusion or pneumothorax identified.       Cameron Logan MD

## 2020-09-14 NOTE — PROGRESS NOTES
Chart reviewed noted pt remained in ICU over weekend still required BIPAP during the night, pt now in 2L NC, cm will cont to review case for psych d/c recommendations.

## 2020-09-14 NOTE — BH NOTES
[address]    Name:  Rod Morgan  MR#:   [id number]  :  1950  ACCOUNT #:  [de-identified]  DATE OF SERVICE:  2020      DATE OF INITIAL CONSULT:  2020    DATE OF FOLLOWUP:  2020    REASON FOR CONSULTATION:  Methadone overdose. INTERIM HISTORY:  The patient is a 61-year-old female with a history of PTSD, bipolar disorder, depression, opioid use disorder, who was admitted after intentional overdose on methadone. An attempt to interview the patient previously, she was not able to talk. Today, she is more awake and alert, but somewhat restless in the bed. She is oriented to the place and person. Regarding time, she thinks today is Tuesday and date as . She was able to say that she took additional methadone that she had from the previous day, even though it is unclear if she is saving some methadone that she get daily from the clinic for taking additional.  When we spoke to the , he did not feel like she was attempted to hurt herself as she was not acting depressed, but the patient did report she was feeling very depressed, and from time to time \"I go through bad depression,\" and appears to have taken extra methadone with intention of harming herself, even though she is not having a clear information on the suicidal ideation. She did say that at some point she wanted to die, but continues to feel very depressed still. She reports that she is feeling very hungry that she has not eaten in four days and asking for food. She denies having any hallucinations or delusions. She denies having any thoughts of homicidal ideations. Reviewed the patient's chart and ongoing treatment. VITAL SIGNS:  Temperature 98.6, pulse rate 46, respiratory rate 14, blood pressure 162/74. MENTAL STATUS EXAMINATION:  Appearance: The patient is an obese, elderly [facility] female who appears much older than stated age.   She is lying in the bed somewhat restless, moving her legs. She is alert and oriented x2. Speech:  Her speech is spontaneous, but low volume and tone. Mood:  Her mood is expressed in \"depressed. \"  Affect:  Her affect is tearful and mood congruent. Thought Process:  Appears to be somewhat disorganized. Thought Content:  Passive suicidal ideations voiced. No homicidal ideations. No paranoia. Perceptual Disturbances:  Denies having any hallucinations. Insight is fair. Impulse control is poor. Memory is poor to fair. ASSESSMENT:  The patient was seen for followup. She reported intentional overdose on the methadone and feeling very depressed. Given her current presentation, she is medically not stable for inpatient psychiatric admission. The patient can be reevaluated in the 24-48 hours to see further improvement, and if there is a need for inpatient psychiatric admission versus outpatient management, recommend continuing the one-to-one safety sitter until seen again. Regarding opioid use and methadone management, given the patient's current condition with bradycardia and high risk of QT prolongation, methadone may not be an appropriate choice. Also the patient does not appear to be having any opioid withdrawal.  Recommend continued monitoring for withdrawals using COWS scale and treat her symptomatically. Also as specified in previous note, avoid QT prolonging agents like Lexapro, trazodone that she was taking previously as an outpatient. Psychiatrist on-call is available for any additional questions, please call as needed. Thanks for the consult.       MD ANGELES [email or web address]  D:  09/14/2020 13:45  T:  09/14/2020 15:09  JOB #:  [id number]

## 2020-09-14 NOTE — DIABETES MGMT
GLYCEMIC CONTROL PROGRESS NOTE:    - discussed in rounds, known h/o DM2, HbA1C within recommended range for age + comorbids  - NPO  - Solumedrol 20 mg Q 8 hours  - TDD = 2 Humalog Normal Insulin Sensitivity Corrective Coverage, recommend continue    Recent Glucose Results:   Lab Results   Component Value Date/Time     (H) 09/14/2020 05:20 AM    GLUCPOC 110 09/14/2020 05:09 AM    GLUCPOC 109 09/13/2020 11:15 PM    GLUCPOC 155 (H) 09/13/2020 06:33 PM     Loretta Mcneil MS, RN, CDE  Glycemic Control Team  [phone]  Pager [phone] (M-TH 8:00-4:30P)  *After Hours pager [phone]

## 2020-09-14 NOTE — PROGRESS NOTES
Cardiology Progress Note        Patient: Mary River        Sex: female          DOA: 9/9/2020  YOB: 1950      Age:  72 y.o.        LOS:  LOS: 5 days   Assessment/Plan     Principal Problem:    Methadone overdose ([address] (9/9/2020)    Active Problems:    Hypercarbia (9/9/2020)      Hypoxia (9/9/2020)      LIONEL (acute kidney injury) ([address] (9/9/2020)      Prolonged Q-T interval on ECG (9/9/2020)      Bradycardia (9/9/2020)      Encephalopathy acute (9/9/2020)      Thrombocytopenia ([address] (9/10/2020)      Abnormal echocardiogram (9/11/2020)      Acute on chronic respiratory failure with hypoxia and hypercapnia (HCC) (9/11/2020)      Pancytopenia ([address] (2/16/8928)      Diastolic congestive heart failure ([address] (9/13/2020)      Nonrheumatic mitral valve regurgitation (9/13/2020)      Hepatosplenomegaly (9/13/2020)        Plan:    Bradycardia  Abnormal echocardiogram    Bradycardia is sinus bradycardia with normal AV conduction and narrow QRS complex. Avoid AV dyan blocking medication. TSH is normal.  Bradycardia is probably due to methadone overdose as well as some sedation effect and cannot exclude any underlying sleep apnea. Heart rate improved with mild right arm movement/exercise. Keep atropine in the room. Once patient is stable enough to walk need to look at chronotropic response of heart on ambulation. I have reviewed the echocardiogram from [facility] patient had similar anterior leaflet tricuspid changes without any evidence of vegetation. The echocardiogram done overhead have somewhat more gain as compared to other echocardiogram.  No indication for transesophageal echocardiogram.  Continue with current medical treatment. Subjective:    cc:  Bradycardia  Abnormal echocardiogram        REVIEW OF SYSTEMS:     General: No fevers or chills. Cardiovascular: No chest pain or pressure. No palpitations.  No ankle swelling  Pulmonary: No SOB, orthopnea, PND  Gastrointestinal: No nausea, vomiting or diarrhea      Objective:      Visit Vitals  BP (!) 175/65   Pulse (!) 49   Temp 98.6 °F (37 °C)   Resp 12   Ht 5' 2\" (1.575 m)   Wt 83.9 kg (184 lb 15.5 oz)   SpO2 96%   BMI 33.83 kg/m²     Body mass index is 33.83 kg/m². Physical Exam:  General Appearance: Comfortable,  NECK: No JVD, no thyroidomeglay. LUNGS: Clear bilaterally. HEART: S1+S2 audible,    ABD: Non-tender, BS Audible    EXT: No edema, and no cysnosis. VASCULAR EXAM: Pulses are intact. PSYCHIATRIC EXAM: Mood is appropriate.     Medication:  Current Facility-Administered Medications   Medication Dose Route Frequency    LORazepam (ATIVAN) injection 1 mg  1 mg IntraVENous Q2H PRN    heparin (porcine) injection 5,000 Units  5,000 Units SubCUTAneous Q8H    methylPREDNISolone (PF) (SOLU-MEDROL) injection 20 mg  20 mg IntraVENous Q8H    furosemide (LASIX) injection 20 mg  20 mg IntraVENous DAILY    clindamycin (CLEOCIN) 600mg NS 50 mL IVPB (premix)  600 mg IntraVENous Q8H    dextrose 5% and 0.9% NaCl infusion  25 mL/hr IntraVENous CONTINUOUS    ELECTROLYTE REPLACEMENT PROTOCOL - Phosphorus  Standard Dosing  1 Each Other PRN    nicotine (NICODERM CQ) 14 mg/24 hr patch 1 Patch  1 Patch TransDERmal DAILY    budesonide (PULMICORT) 500 mcg/2 ml nebulizer suspension  500 mcg Nebulization BID RT    albuterol-ipratropium (DUO-NEB) 2.5 MG-0.5 MG/3 ML  3 mL Nebulization TID RT    promethazine (PHENERGAN) 12.5 mg in 0.9% sodium chloride 50 mL IVPB  12.5 mg IntraVENous Q6H PRN    arformoteroL (BROVANA) neb solution 15 mcg  15 mcg Nebulization BID RT    sodium chloride (NS) flush 5-40 mL  5-40 mL IntraVENous Q8H    sodium chloride (NS) flush 5-40 mL  5-40 mL IntraVENous PRN    acetaminophen (TYLENOL) tablet 650 mg  650 mg Oral Q6H PRN    Or    acetaminophen (TYLENOL) suppository 650 mg  650 mg Rectal Q6H PRN    bisacodyL (DULCOLAX) suppository 10 mg  10 mg Rectal DAILY PRN    promethazine (PHENERGAN) tablet 12.5 mg  12.5 mg Oral Q6H PRN    Or    ondansetron (ZOFRAN) injection 4 mg  4 mg IntraVENous Q6H PRN    naloxone (NARCAN) injection 0.4 mg  0.4 mg IntraVENous EVERY 2 MINUTES AS NEEDED    insulin lispro (HUMALOG) injection   SubCUTAneous Q6H    glucose chewable tablet 16 g  4 Tab Oral PRN    glucagon (GLUCAGEN) injection 1 mg  1 mg IntraMUSCular PRN    ELECTROLYTE REPLACEMENT PROTOCOL - Potassium Standard Dosing   1 Each Other PRN    ELECTROLYTE REPLACEMENT PROTOCOL - Magnesium   1 Each Other PRN    ELECTROLYTE REPLACEMENT PROTOCOL - Calcium   1 Each Other PRN    cloNIDine HCL (CATAPRES) tablet 0.1 mg  0.1 mg Oral Q4H PRN    pantoprazole (PROTONIX) 40 mg in 0.9% sodium chloride 10 mL injection  40 mg IntraVENous Q24H               Lab/Data Reviewed:  Procedures/imaging: see electronic medical records for all procedures/Xrays   and details which were not copied into this note but were reviewed prior to creation of Plan       All lab results for the last 24 hours reviewed. Recent Labs     09/14/20 0520 09/13/20 0420 09/12/20  0441   WBC 1.8* 1.4* 1.4*   HGB 10.4* 9.7* 9.8*   HCT 31.8* 30.7* 30.7*   PLT 51* 49* 38*     Recent Labs     09/14/20 0520 09/13/20 0420 09/12/20  0441    142 143   K 4.2 4.5 4.4    109 111   CO2 31 28 27   * 134* 110*   BUN 32* 24* 24*   CREA 0.80 0.79 0.82   CA 9.3 8.8 9.1       RADIOLOGY:  CT Results  (Last 48 hours)               09/12/20 2117  CT HEAD WO CONT Final result    Impression:  IMPRESSION:       No acute findings or appreciable change from 09/09/2020.       _______________        Narrative:  _______________       EXAM: CT HEAD WO CONT.   _______________       CLINICAL INDICATION/HISTORY: Bradycardia and neuro fluctuations.   -Additional: None. COMPARISON: CT of 09/09/2020.   _______________       TECHNIQUE/COMPARISON: Nonenhanced CT examination of the head was performed.    Sagittal and coronal series were reformatted from the axial source images. One or more dose reduction techniques were used on this CT: automated exposure   control, adjustment of the mAs and/or kVp according to patient size, and   iterative reconstruction techniques. The specific techniques used on this CT   exam have been documented in the patient's electronic medical record. Digital   Imaging and Communications in Medicine (DICOM) format image data are available   to nonaffiliated external healthcare facilities or entities on a secure, media   free, reciprocally searchable basis with patient authorization for at least a   12-month period after this study. _______________       FINDINGS:       BRAIN AND POSTERIOR FOSSA: There is no evidence of acute vascular territorial   ischemia, intracranial hemorrhage, midline shift or mass effect. Periventricular   and subcortical white matter hypoattenuation is most compatible with chronic   microangiopathy. Mild ventricular and sulcal prominence represents age-related   involutional changes and volume loss. EXTRA-AXIAL SPACES: There are no abnormal extra-axial fluid collections. CALVARIA AND SOFT TISSUES: No acute calvarial or extracalvarial soft tissue   findings of concern. OTHER: The visualized paranasal sinuses are essentially clear, as are the   mastoid air cells bilaterally.         _______________               CXR Results  (Last 48 hours)    None            Cardiology Procedures:   Results for orders placed or performed during the hospital encounter of 09/09/20   EKG, 12 LEAD, INITIAL   Result Value Ref Range    Ventricular Rate 35 BPM    Atrial Rate 35 BPM    P-R Interval 174 ms    QRS Duration 94 ms    Q-T Interval 550 ms    QTC Calculation (Bezet) 419 ms    Calculated P Axis 26 degrees    Calculated R Axis 25 degrees    Calculated T Axis 40 degrees    Diagnosis       Marked sinus bradycardia  Abnormal ECG  Confirmed by Ryder Garsia MD, Shola ([id number]) on 9/13/2020 2:15:36 PM Echo Results  (Last 48 hours)    None       Cardiolite (Tc-99m Sestamibi) stress test    Signed By: Karolina Henderson MD     September 14, 2020

## 2020-09-14 NOTE — PROGRESS NOTES
Occupational Therapy Discharge    Chart reviewed. Attempted Occupational Therapy Evaluation. Orders received 09/09/20 and patient has been medically unstable. Pt requiring BiPAP; pt jessee. Will sign off. Please reorder when patient is medically stable for EOB/OOB ADL participation. Thank you.   Vee Harvey, OTR/L, CSRS, CDCS, Plei

## 2020-09-14 NOTE — PROGRESS NOTES
1900 Bedside and Verbal shift change report given to Cari Persaud (oncoming nurse) by Sherri Cabezas (offgoing nurse). Report included the following information SBAR, Kardex, ED Summary, Intake/Output, MAR, Recent Results and Cardiac Rhythm SB/NSR.     2320 Pt placed on BiPAP by primary RN. Pt SPO2 dropping to 88-89% on 2LNC    0550 Pt pulling mask off stating she is very hungry and has to go get something to eat. Pt has been resting throughout the night with BiPAP since it was placed at 2320 A&OX4 with brief periods of anxiety where she wakes ups and tries to pull mask off then is redirected goes back to sleep. Pt has been laughing and joking with staff and other than being hungry very cooperative. Johan Stoll MD made aware and ordered SLP to see pt to hopefully feed her today. Wore 2LNC all day yesterday and tolerated     0700 Bedside and Verbal shift change report given to Jenae Méndez RN (oncoming nurse) by Allison Hanson RN (offgoing nurse). Report included the following information SBAR, Kardex, ED Summary, Intake/Output, MAR, Recent Results and Cardiac Rhythm SB/NSR.

## 2020-09-14 NOTE — PROGRESS NOTES
0700  Bedside, Verbal and Written shift change report given to KOJO Arellano (oncoming nurse) by JB Berger,SARITA (offgoing nurse). Report included the following information SBAR, Kardex, Intake/Output and Recent Results. 0800  Assessed pt. Pt aox4, following commands, denies pian and sob at this time. Tele monitored, 2L NC, external epstein catheter. Sitter at bedside          1500  Mr. Linda Patiño at bedside. Pt has increased restlessness and agitation, moaning and yelling out. Pt denies pain, VSS. Mr. Linda Patiño states \" we are punishing her\". 1600  Transport at bedside, taking pt for MRI.  1625  Pt returning from MRI. Unable to tolerate scan. (restless)  1815  Pt restless, pulling on lines, trying to roll out of bed to floor. Dr. Hallie Mena paged given SBAR. Dr. Hallie Mena at bedside, orders received. Restrain for safety. 1900 Bedside, Verbal and Written shift change report given to Hawa Reich (oncoming nurse) by David Arellano (offgoing nurse). Report included the following information SBAR, Kardex, Intake/Output and Recent Results.

## 2020-09-14 NOTE — PROGRESS NOTES
Pulmonary, Critical Care, and Sleep Medicine     Pulmonary Progress Note    Name: Evelina Payne   : 1950   MRN: [id number]   Date: 2020    [x]I have reviewed the flowsheet and previous days notes. Events, vitals, medications and notes from last 24 hours reviewed. Care plan discussed on multidisciplinary rounds. IMPRESSION:   Patient Active Problem List   Diagnosis Code    Acute on chronic respiratory failure with hypoxia and hypercapnia (HCC) J96.21, J96.22    Encephalopathy acute G93.40    Methadone overdose ([facility] [address] [address] W48.9G4M    Diastolic congestive heart failure (HCC) I50.30    Prolonged Q-T interval on ECG R94.31    Bradycardia R00.1    Abnormal echocardiogram R93.1    LIONEL (acute kidney injury) ([facility] [address] N17.9    Rhabdomyolysis M62.82    Thrombocytopenia (HCC) D69.6    Pancytopenia, chronic (HCC) D61.818    Cirrhosis of liver K74.60    Pronounced biliary ductal dilatation     Hepatosplenomegaly R16.2    Nonrheumatic mitral valve regurgitation I34.0    RLS G25.81    Former smoker Z87.891    PTSD F43.1    Bipolar disorder F31    Multiple substance use disorder - cannabis, cocaine, heroin, ETOH F19      PLAN:   Respi: Continue BiPAP at night and PRN during day- adjust settings per ABG or for goal SPO2 > 91%  Supplemental O2 at 2 Lpm via NC during daytime, titrate flow for goal SPO2> 91%  Taper steroid per clinical improvement  Aspiration prevention bundle, head of the bed at 30' all times  PFT and PSG as out patient with regular pulmonologist at [place] recommended  Bronchodilators, pulmonary hygiene care  ID: No clinical evidence of sepsis  Antibiotic choice: Clindamycin empirically - CXR in AM if stable or improving then may DC  Blood Cultures NGTD.   Await HIV and Hep C testing  Fluids: D5NS at 25 mL/hr  Cardio: Monitor HR; cardiology suspects bradycardia medication induced; hemodynamics stable  Periodic EKG per cardiology to monitor QTc  Diuresis with low dose Lasix at 20 mg as tolerated  Heme: Smear reviewed by hematology. No plt clumping, no schistocytes, no hypochromia, no bandemia or other signs of infection per hematology. Given alcohol abuse, cirrhosis and chronic thrombocytopenia in the setting of normal renal function, unlikely TTP per hematology. Normal fibrinogen on DIC panel, mild INR felt to be 2nd to cirrhosis. Monitor CBC daily  Renal: Monitor renal functions, CK and UO  Replace lytes per unit protocol as needed  GI: diet after swallow wvaluation  Normal LFT and hepatosplenomegaly related hematological findings  No clinical evidence of acute cholecystitis or acute abdomen  Endo: Glycemic control. Normal TSH  Neuro: MRI when clinically possible- spoke with RN to contact radiology  Normal B12, Folate, iron, Ammonia  Patient appears improved this AM; came off of Precedex from y'day and tolerating well  Psych: Appreciate psych input regarding methadone and other meds  Sitter for any suicidal risk. Patient declined any suicidal ideation. Reported at bedside that she took additional dose of Methadone since she missed dose previous day  Will defer respective systems problem management to primary and other consultant and follow patient in ICU with primary and other medical team  Quality Care: PPI, DVT prophylaxis, HOB elevated, Infection control all reviewed and addressed. PAIN AND SEDATION: had fall few weeks back at home reportedly. Judicious opiate use  · Skin/Wound: bruise on LT thigh from recent fall  · Nutrition: NPO  · Prophylaxis: DVT [Heparin] and GI [PPI] Prophylaxis reviewed. · Restraints: as needed to minimize interruption in medical are and to avoid patient pulling out lines, cables, catheters  · PT/OT eval and treat: as needed when stable   · Lines/Tubes: lines PIV  ADVANCE DIRECTIVE: full code. DISCUSSION: spoke with patient, RN, RT, Dr. Calos Cardozo and notes from last 24 hours reviewed.  Care plan discussed with nursing                  HPI:   72 y.o. female with depression, heroin abuse on methadone came to ER with EMS due to mental status changes, became confused and suspected taking additional methadone. She was given 1.5 mg Narcan per EMS, her mental status and confusion got improving. She received another 2 mg Narcan in our ER, she become more awake and alert. In ER, noted with hypercapnia, bradycardia, EKG with prolonged QT. CT head no acute proces      20  Subjective:   Patient awake, alert, O x 3, calm in bed sitting up, following all commands, slow  Some shaking of hand when trying to reach anything in bed  The patient reports ANGEL chronic and stable; denies cough, chest pain, wheezing or hemoptysis  Patient denies any palpitations, syncope, orthopnea, edema or paroxysmal nocturnal dyspnea  Reports pain controlled and not on any new opiates at home  Reports missing Methadone day prior and took additional dose on day of event  Denies abdominal or flank pain, anorexia, nausea or vomiting, dysphagia, change in bowel habits or black or bloody stools  Afebrile. Good urine output  Remained NPO    ROS: Review of systems not obtained due to patient factors. Past Medical History:  Past Medical History:   Diagnosis Date    Chronic back pain     Diabetes ([facility]     Drug abuse ([facility]     Hepatic cirrhosis ([facility]     Hepatitis C     Heroin abuse ([facility]     Pain management     Restless leg syndrome     Sciatica     Spleen enlarged     Thyroid disease         Allergy:  Allergies   Allergen Reactions    Erythromycin Hives    Penicillins Hives    Tetracycline Hives        Vital Signs:    Blood pressure (!) 147/58, pulse (!) 39, temperature 98.6 °F (37 °C), resp. rate 22, height 5' 2\" (1.575 m), weight 83.9 kg (184 lb 15.5 oz), SpO2 95 %. Body mass index is 33.83 kg/m².    O2 Device: Nasal cannula   O2 Flow Rate (L/min): 2 l/min   Temp (24hrs), Av.4 °F (36.9 °C), Min:97.7 °F (36.5 °C), Max:99.1 °F (37.3 °C)         Intake/Output:   Last shift:       1901 - 09/14 0700  In: 25.8 [I.V.:25.8]  Out: 650 [Urine:650]  Last 3 shifts: 09/12 0701 - 09/13 1900  In: 1270.3 [I.V.:1270.3]  Out: 1500 [Urine:1500]    Intake/Output Summary (Last 24 hours) at 9/13/2020 2121  Last data filed at 9/13/2020 2000  Gross per 24 hour   Intake 825 ml   Output 1450 ml   Net -625 ml       Physical Exam:  General: Alert and oriented to all spheres, slow to respond, in no respiratory distress and acyanotic, cooperative, appears older than stated age, on O2 via NC  HEENT: PERRLA, EOMI, fundi benign, throat normal without erythema or exudate, has denture  Neck: No abnormally enlarged lymph nodes or thyroid, supple  Chest: normal  Lungs: normal air entry, clear to auscultation bilaterally, normal percussion bilaterally, no tenderness/ rash  Heart: Regular rate and rhythm, S1S2 present or without murmur or extra heart sounds  Abdomen: protuberant, bowel sounds normoactive, tympanic, abdomen is soft without significant tenderness, masses, organomegaly or guarding, rigidity, rebound  Extremity: negative edema, cyanosis, clubbing; old bruise on LT thigh  Capillary refill: normal  Neuro: slow to respond, some hand shaking when trying to reach anything in bed, alert, oriented x3, moves all extremities well, exam limitations  Skin: Skin color, texture, turgor fair.  Skin dry, warm, non-diaphoretic    DATA:   Current Facility-Administered Medications   Medication Dose Route Frequency    LORazepam (ATIVAN) injection 1 mg  1 mg IntraVENous Q2H PRN    heparin (porcine) injection 5,000 Units  5,000 Units SubCUTAneous Q8H    methylPREDNISolone (PF) (SOLU-MEDROL) injection 20 mg  20 mg IntraVENous Q8H    furosemide (LASIX) injection 20 mg  20 mg IntraVENous DAILY    clindamycin (CLEOCIN) 600mg NS 50 mL IVPB (premix)  600 mg IntraVENous Q8H    dextrose 5% and 0.9% NaCl infusion  25 mL/hr IntraVENous CONTINUOUS    ELECTROLYTE REPLACEMENT PROTOCOL - Phosphorus  Standard Dosing  1 Each Other PRN    nicotine (NICODERM CQ) 14 mg/24 hr patch 1 Patch  1 Patch TransDERmal DAILY    budesonide (PULMICORT) 500 mcg/2 ml nebulizer suspension  500 mcg Nebulization BID RT    albuterol-ipratropium (DUO-NEB) 2.5 MG-0.5 MG/3 ML  3 mL Nebulization TID RT    promethazine (PHENERGAN) 12.5 mg in 0.9% sodium chloride 50 mL IVPB  12.5 mg IntraVENous Q6H PRN    arformoteroL (BROVANA) neb solution 15 mcg  15 mcg Nebulization BID RT    sodium chloride (NS) flush 5-40 mL  5-40 mL IntraVENous Q8H    sodium chloride (NS) flush 5-40 mL  5-40 mL IntraVENous PRN    acetaminophen (TYLENOL) tablet 650 mg  650 mg Oral Q6H PRN    Or    acetaminophen (TYLENOL) suppository 650 mg  650 mg Rectal Q6H PRN    bisacodyL (DULCOLAX) suppository 10 mg  10 mg Rectal DAILY PRN    promethazine (PHENERGAN) tablet 12.5 mg  12.5 mg Oral Q6H PRN    Or    ondansetron (ZOFRAN) injection 4 mg  4 mg IntraVENous Q6H PRN    naloxone (NARCAN) injection 0.4 mg  0.4 mg IntraVENous EVERY 2 MINUTES AS NEEDED    insulin lispro (HUMALOG) injection   SubCUTAneous Q6H    glucose chewable tablet 16 g  4 Tab Oral PRN    glucagon (GLUCAGEN) injection 1 mg  1 mg IntraMUSCular PRN    ELECTROLYTE REPLACEMENT PROTOCOL - Potassium Standard Dosing   1 Each Other PRN    ELECTROLYTE REPLACEMENT PROTOCOL - Magnesium   1 Each Other PRN    ELECTROLYTE REPLACEMENT PROTOCOL - Calcium   1 Each Other PRN    cloNIDine HCL (CATAPRES) tablet 0.1 mg  0.1 mg Oral Q4H PRN    pantoprazole (PROTONIX) 40 mg in 0.9% sodium chloride 10 mL injection  40 mg IntraVENous Q24H       Telemetry: [x]Sinus []A-flutter []Paced    []A-fib []Multiple PVCs                    Labs:  Recent Results (from the past 24 hour(s))   GLUCOSE, POC    Collection Time: 09/12/20 11:10 PM   Result Value Ref Range    Glucose (POC) 128 (H) 70 - 110 mg/dL   MAGNESIUM    Collection Time: 09/13/20  4:20 AM   Result Value Ref Range    Magnesium 1.8 1.6 - 2.6 mg/dL   CALCIUM, IONIZED    Collection Time: 09/13/20 4:20 AM   Result Value Ref Range    Ionized Calcium 1.24 1.12 - 1.32 MMOL/L   PHOSPHORUS    Collection Time: 09/13/20  4:20 AM   Result Value Ref Range    Phosphorus 2.6 2.5 - 4.9 MG/DL   CBC WITH AUTOMATED DIFF    Collection Time: 09/13/20  4:20 AM   Result Value Ref Range    WBC 1.4 (L) 4.6 - 13.2 K/uL    RBC 3.63 (L) 4.20 - 5.30 M/uL    HGB 9.7 (L) 12.0 - 16.0 g/dL    HCT 30.7 (L) 35.0 - 45.0 %    MCV 84.6 74.0 - 97.0 FL    MCH 26.7 24.0 - 34.0 PG    MCHC 31.6 31.0 - 37.0 g/dL    RDW 15.3 (H) 11.6 - 14.5 %    PLATELET 49 (L) 586 - 420 K/uL    MPV 9.9 9.2 - 11.8 FL    NEUTROPHILS 84 (H) 40 - 73 %    LYMPHOCYTES 11 (L) 21 - 52 %    MONOCYTES 4 3 - 10 %    EOSINOPHILS 1 0 - 5 %    BASOPHILS 0 0 - 2 %    ABS. NEUTROPHILS 1.2 (L) 1.8 - 8.0 K/UL    ABS. LYMPHOCYTES 0.2 (L) 0.9 - 3.6 K/UL    ABS. MONOCYTES 0.1 0.05 - 1.2 K/UL    ABS. EOSINOPHILS 0.0 0.0 - 0.4 K/UL    ABS. BASOPHILS 0.0 0.0 - 0.1 K/UL    DF AUTOMATED     METABOLIC PANEL, COMPREHENSIVE    Collection Time: 09/13/20  4:20 AM   Result Value Ref Range    Sodium 142 136 - 145 mmol/L    Potassium 4.5 3.5 - 5.5 mmol/L    Chloride 109 100 - 111 mmol/L    CO2 28 21 - 32 mmol/L    Anion gap 5 3.0 - 18 mmol/L    Glucose 134 (H) 74 - 99 mg/dL    BUN 24 (H) 7.0 - 18 MG/DL    Creatinine 0.79 0.6 - 1.3 MG/DL    BUN/Creatinine ratio 30 (H) 12 - 20      GFR est AA >60 >60 ml/min/1.73m2    GFR est non-AA >60 >60 ml/min/1.73m2    Calcium 8.8 8.5 - 10.1 MG/DL    Bilirubin, total 0.6 0.2 - 1.0 MG/DL    ALT (SGPT) 43 13 - 56 U/L    AST (SGOT) 104 (H) 10 - 38 U/L    Alk.  phosphatase 92 45 - 117 U/L    Protein, total 6.1 (L) 6.4 - 8.2 g/dL    Albumin 3.0 (L) 3.4 - 5.0 g/dL    Globulin 3.1 2.0 - 4.0 g/dL    A-G Ratio 1.0 0.8 - 1.7     PROTHROMBIN TIME + INR    Collection Time: 09/13/20  4:20 AM   Result Value Ref Range    Prothrombin time 17.4 (H) 11.5 - 15.2 sec    INR 1.5 (H) 0.8 - 1.2     GLUCOSE, POC    Collection Time: 09/13/20  5:23 AM   Result Value Ref Range    Glucose (POC) 149 (H) 70 - 110 mg/dL   POC G3    Collection Time: 09/13/20  5:54 AM   Result Value Ref Range    Device: BIPAP      FIO2 (POC) 0.30 %    pH (POC) 7.37 7.35 - 7.45      pCO2 (POC) 53.8 (H) 35.0 - 45.0 MMHG    pO2 (POC) 91 80 - 100 MMHG    HCO3 (POC) 30.7 (H) 22 - 26 MMOL/L    sO2 (POC) 96 92 - 97 %    Base excess (POC) 5 mmol/L    PEEP/CPAP (POC) 6 cmH2O    PIP (POC) 16      Allens test (POC) YES      Total resp. rate 18      Site LEFT RADIAL      Patient temp. 99.1      Specimen type (POC) ARTERIAL      Performed by Salvador Khalil    MAGNESIUM    Collection Time: 09/13/20 10:20 AM   Result Value Ref Range    Magnesium 3.6 (H) 1.6 - 2.6 mg/dL   GLUCOSE, POC    Collection Time: 09/13/20 11:49 AM   Result Value Ref Range    Glucose (POC) 158 (H) 70 - 110 mg/dL   GLUCOSE, POC    Collection Time: 09/13/20  6:33 PM   Result Value Ref Range    Glucose (POC) 155 (H) 70 - 110 mg/dL           Recent Labs     09/13/20  0554 09/12/20  1318 09/12/20  1136   FIO2I 0.30 40 40   HCO3I 30.7* 26.8* 28.6*   PCO2I 53.8* 47.0* 60.9*   PHI 7.37 7.36 7.28*   PO2I 91 142* 41*       All Micro Results     Procedure Component Value Units Date/Time    CULTURE, BLOOD [[id number]] Collected:  09/11/20 1010    Order Status:  Completed Specimen:  Blood Updated:  09/13/20 0712     Special Requests: NO SPECIAL REQUESTS        Culture result: NO GROWTH 2 DAYS       CULTURE, BLOOD [[id number]] Collected:  09/11/20 0955    Order Status:  Completed Specimen:  Blood Updated:  09/13/20 0712     Special Requests: NO SPECIAL REQUESTS        Culture result: NO GROWTH 2 DAYS             9/10/20 ECHO   · LV: Estimated LVEF is 60 - 65%. Normal cavity size, systolic function (ejection fraction normal) and diastolic function. Mild concentric hypertrophy. E/E' ratio is 7.55.  · LA: Moderately dilated left atrium. · RV: Mildly dilated right ventricle. · RA: Mildly dilated right atrium. · MV: Mitral valve non-specific thickening.  Mild mitral annular calcification. Moderate mitral valve regurgitation is present. · TV: Non-specific thickening of the tricuspid valve. Moderate tricuspid valve regurgitation is present. · PA: Mild pulmonary hypertension. Pulmonary arterial systolic pressure is 44 mmHg. There is a small thickening /degenerative changes on tip of anterior leaflet of the tricuspid valve on ventricular side, without independent movement. I cannot exclude possible small vegetation. No previous echocardiogram done at [facility] on 10/11/2019 images available to compare. Imaging:  [x]I have personally reviewed the patients chest radiographs images and report   9/12/20  Results from Hospital Encounter encounter on 09/09/20   XR CHEST PORT    Narrative EXAM: Portable chest radiograph 9/12/2020    CLINICAL INDICATION/HISTORY: Hypoxia. TECHNIQUE: A semierect portable radiograph was obtained. COMPARISON: 9/9/2020. FINDINGS: The cardiac and mediastinal contours are within normal limits. There  is mild left infrahilar atelectasis or scar. Underlying infiltrate cannot be  excluded. The lungs are otherwise clear. There is no pneumothorax or pleural  effusion. Impression IMPRESSION:     1. Mild left infrahilar atelectasis, scar, and/or infiltrate. 9/12/20  Results from Hospital Encounter encounter on 09/09/20   CT HEAD WO CONT    Narrative _______________    EXAM: CT HEAD WO CONT.  _______________    CLINICAL INDICATION/HISTORY: Bradycardia and neuro fluctuations.  -Additional: None. COMPARISON: CT of 09/09/2020.  _______________    TECHNIQUE/COMPARISON: Nonenhanced CT examination of the head was performed. Sagittal and coronal series were reformatted from the axial source images. One or more dose reduction techniques were used on this CT: automated exposure  control, adjustment of the mAs and/or kVp according to patient size, and  iterative reconstruction techniques.  The specific techniques used on this CT  exam have been documented in the patient's electronic medical record. Digital  Imaging and Communications in Medicine (DICOM) format image data are available  to nonaffiliated external healthcare facilities or entities on a secure, media  free, reciprocally searchable basis with patient authorization for at least a  12-month period after this study. _______________    FINDINGS:    BRAIN AND POSTERIOR FOSSA: There is no evidence of acute vascular territorial  ischemia, intracranial hemorrhage, midline shift or mass effect. Periventricular  and subcortical white matter hypoattenuation is most compatible with chronic  microangiopathy. Mild ventricular and sulcal prominence represents age-related  involutional changes and volume loss. EXTRA-AXIAL SPACES: There are no abnormal extra-axial fluid collections. CALVARIA AND SOFT TISSUES: No acute calvarial or extracalvarial soft tissue  findings of concern. OTHER: The visualized paranasal sinuses are essentially clear, as are the  mastoid air cells bilaterally. _______________      Impression IMPRESSION:    No acute findings or appreciable change from 09/09/2020.    _______________        9/9/20  CT head  IMPRESSION:  No acute intracranial abnormalities      9/12/20 US ABD   IMPRESSION:   1. Cirrhotic hepatic morphology, as described above. 2.  Pronounced splenomegaly. 3.  Pronounced biliary ductal dilatation. Correlation with the patient's liver function tests is suggested. MRCP could be performed, as clinically warranted for further evaluation. PVL BL LE 9/12/20  · No evidence of deep vein thrombosis in the right lower extremity. · No evidence of deep vein thrombosis in the left lower extremity.       [x]See my orders for details    My assessment, plan of care, findings, medications, side effects etc were discussed with:  [x]nursing []PT/OT    []respiratory therapy []Dr. Nickie Billings []Patient     [x]Total critical care time exclusive of procedures 36 minutes with complex decision making performed and > 50% time spent in face to face evaluation.     Vanessa Salgado MD

## 2020-09-14 NOTE — PROGRESS NOTES
Problem: Dysphagia (Adult)  Goal: *Acute Goals and Plan of Care (Insert Text)  Description: Recommendations:  Diet: Mechanical soft solids, thin liquid   Meds: Per patient preference  Aspiration Precautions  Oral Care TID  Other: Feeding assistance    Goals:  Patient will:  1. Tolerate PO trials with 0 s/s overt distress in 4/5 trials  2. Utilize compensatory swallow strategies/maneuvers (decrease bite/sip, size/rate, alt. liq/sol) with min cues in 4/5 trials  3. Perform oral-motor/laryngeal exercises to increase oropharyngeal swallow function with min cues  4. Complete an objective swallow study (i.e., MBSS) to assess swallow integrity, r/o aspiration, and determine of safest LRD, min A  Outcome: Progressing Towards Goal    SPEECH LANGUAGE PATHOLOGY BEDSIDE SWALLOW EVALUATION    Patient: Shannon Breaux (78 y.o. female)  Date: 9/14/2020  Primary Diagnosis: Methadone overdose ([facility] [address] [T40.3X1A]  Hypercarbia [R06.89]  Hypoxia [R09.02]        Precautions: Aspiration     PLOF: Independent    ASSESSMENT :  Based on the objective data described below, the patient presents with mild oropharyngeal dysphagia in the setting of methadone overdose. SARITA Muniz remained at bedside throughout evaluation. Patient A&Ox4, however remains confused and with inconsistent command following, poor historian. Reports hx of dysphagia but was unable to provide any additional information. Oral-motor exam revealed pt with incomplete dentition and tremulous movement; however, all other structures grossly intact for mastication and deglutition. Patient accepted SLP-fed thin liquid via straw with serial swallows, puree and solid trials; audible swallow and delayed mastication of solids noted however no overt s/sx of aspiration observed across all trials. Recommend initiation of mechanical soft solid, thin liquid diet with aspiration precautions, feeding assistance, small bites/sips, slow rate. Patient must be alert for all PO intake.  SLP will continue to follow as indicated to assess diet tolerance, possible advancement and education. Patient will benefit from skilled intervention to address the above impairments. Patient's rehabilitation potential is considered to be Fair  Factors which may influence rehabilitation potential include:   []            None noted  [x]            Mental ability/status  [x]            Medical condition  []            Home/family situation and support systems  [x]            Safety awareness  []            Pain tolerance/management  []            Other:      PLAN :  Recommendations and Planned Interventions:  Mechanical soft solids, thin liquid   Frequency/Duration: Patient will be followed by speech-language pathology 1-2 times per day/4-7 days per week to address goals. Discharge Recommendations: To Be Determined     SUBJECTIVE:   Patient stated That was wonderful. OBJECTIVE:     Past Medical History:   Diagnosis Date    Chronic back pain     Diabetes ([facility] [address]     Drug abuse ([facility] [address]     Hepatic cirrhosis (HCC)     Hepatitis C     Heroin abuse (HCC)     Pain management     Restless leg syndrome     Sciatica     Spleen enlarged     Thyroid disease      Past Surgical History:   Procedure Laterality Date    HX CHOLECYSTECTOMY      HX GYN      hysterectomy    HX HEENT      T&A    HX ORTHOPAEDIC      Bunion, left hand and right arm abscess removal     Home Situation:      Diet prior to admission: Regular/thin ? Current Diet:  Mech-soft/thin     Cognitive and Communication Status:  Neurologic State: Alert, Confused, Restless  Orientation Level: Oriented X4  Cognition: Decreased command following, Impaired decision making  Perception: Appears intact  Perseveration: No perseveration noted  Safety/Judgement: Awareness of environment  Oral Assessment:  Oral Assessment  Labial: No impairment  Dentition: Upper dentures  Oral Hygiene: Fair  Lingual: Decreased rate;Decreased strength; Incoordinated  Velum: No impairment  Mandible: No impairment  P.O. Trials:  Patient Position: [facility] 60  Vocal quality prior to P.O.: No impairment  Consistency Presented: Thin liquid;Puree; Solid  How Presented: SLP-fed/presented;Straw;Self-fed/presented;Cup/sip     Bolus Acceptance: No impairment  Bolus Formation/Control: Impaired  Type of Impairment: Delayed;Mastication  Propulsion: No impairment  Oral Residue: None  Initiation of Swallow: No impairment  Laryngeal Elevation: Functional  Aspiration Signs/Symptoms: None  Pharyngeal Phase Characteristics: No impairment, issues, or problems   Effective Modifications: Alternate liquids/solids;Small sips and bites  Cues for Modifications: Moderate       Oral Phase Severity: Mild  Pharyngeal Phase Severity : Mild    PAIN:  Pain level pre-treatment: 0/10   Pain level post-treatment: 0/10     After treatment:   []            Patient left in no apparent distress sitting up in chair  [x]            Patient left in no apparent distress in bed  [x]            Call bell left within reach  [x]            Nursing notified  []            Family present  []            Caregiver present  []            Bed alarm activated    COMMUNICATION/EDUCATION:   [x]            Aspiration precautions; swallow safety; compensatory techniques. [x]            Patient/family have participated as able in goal setting and plan of care. []            Patient/family agree to work toward stated goals and plan of care. []            Patient understands intent and goals of therapy; neutral about participation. []            Patient unable to participate in goal setting/plan of care; educ ongoing with interdisciplinary staff  []         Posted safety precautions in patient's room.     Thank you for this referral,  Corky Tejeda SLP  Time Calculation: 12 mins

## 2020-09-15 ENCOUNTER — APPOINTMENT (OUTPATIENT)
Dept: GENERAL RADIOLOGY | Age: 70
DRG: 917 | End: 2020-09-15
Attending: INTERNAL MEDICINE
Payer: MEDICARE

## 2020-09-15 LAB
ALBUMIN SERPL-MCNC: 3.4 G/DL (ref 3.4–5)
ALBUMIN/GLOB SERPL: 1.2 {RATIO} (ref 0.8–1.7)
ALP SERPL-CCNC: 80 U/L (ref 45–117)
ALT SERPL-CCNC: 48 U/L (ref 13–56)
ANION GAP SERPL CALC-SCNC: 4 MMOL/L (ref 3–18)
AST SERPL-CCNC: 74 U/L (ref 10–38)
BASOPHILS # BLD: 0 K/UL (ref 0–0.1)
BASOPHILS NFR BLD: 0 % (ref 0–2)
BILIRUB SERPL-MCNC: 0.8 MG/DL (ref 0.2–1)
BUN SERPL-MCNC: 37 MG/DL (ref 7–18)
BUN/CREAT SERPL: 42 (ref 12–20)
CA-I SERPL-SCNC: 1.17 MMOL/L (ref 1.12–1.32)
CALCIUM SERPL-MCNC: 9.2 MG/DL (ref 8.5–10.1)
CHLORIDE SERPL-SCNC: 108 MMOL/L (ref 100–111)
CK SERPL-CCNC: 955 U/L (ref 26–192)
CO2 SERPL-SCNC: 32 MMOL/L (ref 21–32)
CREAT SERPL-MCNC: 0.89 MG/DL (ref 0.6–1.3)
DIFFERENTIAL METHOD BLD: ABNORMAL
EOSINOPHIL # BLD: 0 K/UL (ref 0–0.4)
EOSINOPHIL NFR BLD: 0 % (ref 0–5)
ERYTHROCYTE [DISTWIDTH] IN BLOOD BY AUTOMATED COUNT: 15.6 % (ref 11.6–14.5)
GLOBULIN SER CALC-MCNC: 2.9 G/DL (ref 2–4)
GLUCOSE BLD STRIP.AUTO-MCNC: 109 MG/DL (ref 70–110)
GLUCOSE BLD STRIP.AUTO-MCNC: 135 MG/DL (ref 70–110)
GLUCOSE BLD STRIP.AUTO-MCNC: 141 MG/DL (ref 70–110)
GLUCOSE BLD STRIP.AUTO-MCNC: 146 MG/DL (ref 70–110)
GLUCOSE BLD STRIP.AUTO-MCNC: 194 MG/DL (ref 70–110)
GLUCOSE SERPL-MCNC: 128 MG/DL (ref 74–99)
HCT VFR BLD AUTO: 28.9 % (ref 35–45)
HGB BLD-MCNC: 9.5 G/DL (ref 12–16)
LYMPHOCYTES # BLD: 0.2 K/UL (ref 0.9–3.6)
LYMPHOCYTES NFR BLD: 14 % (ref 21–52)
MAGNESIUM SERPL-MCNC: 2.1 MG/DL (ref 1.6–2.6)
MCH RBC QN AUTO: 26.9 PG (ref 24–34)
MCHC RBC AUTO-ENTMCNC: 32.9 G/DL (ref 31–37)
MCV RBC AUTO: 81.9 FL (ref 74–97)
MONOCYTES # BLD: 0.1 K/UL (ref 0.05–1.2)
MONOCYTES NFR BLD: 8 % (ref 3–10)
NEUTS SEG # BLD: 1 K/UL (ref 1.8–8)
NEUTS SEG NFR BLD: 78 % (ref 40–73)
PHOSPHATE SERPL-MCNC: 3.6 MG/DL (ref 2.5–4.9)
PLATELET # BLD AUTO: 49 K/UL (ref 135–420)
PMV BLD AUTO: 9.6 FL (ref 9.2–11.8)
POTASSIUM SERPL-SCNC: 3.7 MMOL/L (ref 3.5–5.5)
PROT SERPL-MCNC: 6.3 G/DL (ref 6.4–8.2)
RBC # BLD AUTO: 3.53 M/UL (ref 4.2–5.3)
SODIUM SERPL-SCNC: 144 MMOL/L (ref 136–145)
WBC # BLD AUTO: 1.3 K/UL (ref 4.6–13.2)

## 2020-09-15 PROCEDURE — 82330 ASSAY OF CALCIUM: CPT

## 2020-09-15 PROCEDURE — 74011000250 HC RX REV CODE- 250: Performed by: INTERNAL MEDICINE

## 2020-09-15 PROCEDURE — 82550 ASSAY OF CK (CPK): CPT

## 2020-09-15 PROCEDURE — 74011250637 HC RX REV CODE- 250/637: Performed by: HOSPITALIST

## 2020-09-15 PROCEDURE — 84100 ASSAY OF PHOSPHORUS: CPT

## 2020-09-15 PROCEDURE — 80053 COMPREHEN METABOLIC PANEL: CPT

## 2020-09-15 PROCEDURE — 83735 ASSAY OF MAGNESIUM: CPT

## 2020-09-15 PROCEDURE — 74011250636 HC RX REV CODE- 250/636: Performed by: FAMILY MEDICINE

## 2020-09-15 PROCEDURE — 65610000006 HC RM INTENSIVE CARE

## 2020-09-15 PROCEDURE — 74011250637 HC RX REV CODE- 250/637: Performed by: INTERNAL MEDICINE

## 2020-09-15 PROCEDURE — 36592 COLLECT BLOOD FROM PICC: CPT

## 2020-09-15 PROCEDURE — 74011636637 HC RX REV CODE- 636/637: Performed by: HOSPITALIST

## 2020-09-15 PROCEDURE — 85025 COMPLETE CBC W/AUTO DIFF WBC: CPT

## 2020-09-15 PROCEDURE — 74011250636 HC RX REV CODE- 250/636: Performed by: INTERNAL MEDICINE

## 2020-09-15 PROCEDURE — 74011000258 HC RX REV CODE- 258: Performed by: INTERNAL MEDICINE

## 2020-09-15 PROCEDURE — 94640 AIRWAY INHALATION TREATMENT: CPT

## 2020-09-15 PROCEDURE — 74011250636 HC RX REV CODE- 250/636: Performed by: HOSPITALIST

## 2020-09-15 PROCEDURE — 93005 ELECTROCARDIOGRAM TRACING: CPT

## 2020-09-15 PROCEDURE — 71045 X-RAY EXAM CHEST 1 VIEW: CPT

## 2020-09-15 PROCEDURE — C9113 INJ PANTOPRAZOLE SODIUM, VIA: HCPCS | Performed by: HOSPITALIST

## 2020-09-15 PROCEDURE — 77010033678 HC OXYGEN DAILY

## 2020-09-15 PROCEDURE — 82962 GLUCOSE BLOOD TEST: CPT

## 2020-09-15 PROCEDURE — 74011000250 HC RX REV CODE- 250: Performed by: HOSPITALIST

## 2020-09-15 PROCEDURE — 36415 COLL VENOUS BLD VENIPUNCTURE: CPT

## 2020-09-15 RX ORDER — POLYETHYLENE GLYCOL 3350 17 G/17G
17 POWDER, FOR SOLUTION ORAL DAILY PRN
Status: DISCONTINUED | OUTPATIENT
Start: 2020-09-15 | End: 2020-09-18 | Stop reason: HOSPADM

## 2020-09-15 RX ORDER — DOCUSATE SODIUM 100 MG/1
100 CAPSULE, LIQUID FILLED ORAL 2 TIMES DAILY
Status: DISCONTINUED | OUTPATIENT
Start: 2020-09-16 | End: 2020-09-18 | Stop reason: HOSPADM

## 2020-09-15 RX ORDER — POTASSIUM CHLORIDE 7.45 MG/ML
10 INJECTION INTRAVENOUS
Status: DISPENSED | OUTPATIENT
Start: 2020-09-15 | End: 2020-09-15

## 2020-09-15 RX ORDER — POTASSIUM CHLORIDE 7.45 MG/ML
10 INJECTION INTRAVENOUS ONCE
Status: COMPLETED | OUTPATIENT
Start: 2020-09-15 | End: 2020-09-15

## 2020-09-15 RX ORDER — IPRATROPIUM BROMIDE AND ALBUTEROL SULFATE 2.5; .5 MG/3ML; MG/3ML
3 SOLUTION RESPIRATORY (INHALATION)
Status: DISCONTINUED | OUTPATIENT
Start: 2020-09-15 | End: 2020-09-18 | Stop reason: HOSPADM

## 2020-09-15 RX ORDER — METHADONE HYDROCHLORIDE 10 MG/1
15 TABLET ORAL
Status: DISCONTINUED | OUTPATIENT
Start: 2020-09-15 | End: 2020-09-18 | Stop reason: HOSPADM

## 2020-09-15 RX ADMIN — BISACODYL 10 MG: 10 SUPPOSITORY RECTAL at 23:11

## 2020-09-15 RX ADMIN — LORAZEPAM 1 MG: 2 INJECTION INTRAMUSCULAR; INTRAVENOUS at 17:57

## 2020-09-15 RX ADMIN — BUDESONIDE 500 MCG: 0.5 INHALANT RESPIRATORY (INHALATION) at 07:29

## 2020-09-15 RX ADMIN — METHYLPREDNISOLONE SODIUM SUCCINATE 20 MG: 40 INJECTION, POWDER, FOR SOLUTION INTRAMUSCULAR; INTRAVENOUS at 22:52

## 2020-09-15 RX ADMIN — METHYLPREDNISOLONE SODIUM SUCCINATE 20 MG: 40 INJECTION, POWDER, FOR SOLUTION INTRAMUSCULAR; INTRAVENOUS at 05:52

## 2020-09-15 RX ADMIN — DEXTROSE MONOHYDRATE AND SODIUM CHLORIDE 25 ML/HR: 5; .9 INJECTION, SOLUTION INTRAVENOUS at 05:52

## 2020-09-15 RX ADMIN — METHADONE HYDROCHLORIDE 15 MG: 10 TABLET ORAL at 22:51

## 2020-09-15 RX ADMIN — FUROSEMIDE 20 MG: 10 INJECTION, SOLUTION INTRAMUSCULAR; INTRAVENOUS at 08:56

## 2020-09-15 RX ADMIN — INSULIN LISPRO 2 UNITS: 100 INJECTION, SOLUTION INTRAVENOUS; SUBCUTANEOUS at 13:18

## 2020-09-15 RX ADMIN — SODIUM CHLORIDE 40 MG: 9 INJECTION, SOLUTION INTRAMUSCULAR; INTRAVENOUS; SUBCUTANEOUS at 22:53

## 2020-09-15 RX ADMIN — HEPARIN SODIUM 5000 UNITS: 5000 INJECTION INTRAVENOUS; SUBCUTANEOUS at 08:56

## 2020-09-15 RX ADMIN — ARFORMOTEROL TARTRATE 15 MCG: 15 SOLUTION RESPIRATORY (INHALATION) at 20:10

## 2020-09-15 RX ADMIN — IPRATROPIUM BROMIDE AND ALBUTEROL SULFATE 3 ML: .5; 3 SOLUTION RESPIRATORY (INHALATION) at 07:29

## 2020-09-15 RX ADMIN — ARFORMOTEROL TARTRATE 15 MCG: 15 SOLUTION RESPIRATORY (INHALATION) at 07:29

## 2020-09-15 RX ADMIN — Medication 10 ML: at 13:19

## 2020-09-15 RX ADMIN — MORPHINE SULFATE 2 MG: 2 INJECTION, SOLUTION INTRAMUSCULAR; INTRAVENOUS at 17:57

## 2020-09-15 RX ADMIN — HEPARIN SODIUM 5000 UNITS: 5000 INJECTION INTRAVENOUS; SUBCUTANEOUS at 02:33

## 2020-09-15 RX ADMIN — HEPARIN SODIUM 5000 UNITS: 5000 INJECTION INTRAVENOUS; SUBCUTANEOUS at 18:17

## 2020-09-15 RX ADMIN — CLINDAMYCIN PHOSPHATE 600 MG: 600 INJECTION, SOLUTION INTRAVENOUS at 08:57

## 2020-09-15 RX ADMIN — BUDESONIDE 500 MCG: 0.5 INHALANT RESPIRATORY (INHALATION) at 20:10

## 2020-09-15 RX ADMIN — POTASSIUM CHLORIDE 10 MEQ: 7.46 INJECTION, SOLUTION INTRAVENOUS at 05:53

## 2020-09-15 RX ADMIN — POTASSIUM CHLORIDE 10 MEQ: 7.46 INJECTION, SOLUTION INTRAVENOUS at 09:55

## 2020-09-15 RX ADMIN — CLINDAMYCIN PHOSPHATE 600 MG: 600 INJECTION, SOLUTION INTRAVENOUS at 03:26

## 2020-09-15 NOTE — DIABETES MGMT
GLYCEMIC CONTROL PROGRESS NOTE:    - discussed in rounds, known h/o DM2, HbA1C within recommended range for age + comorbids  - diet changed to mech soft  - Solumedrol 20 mg Q 8 hours  - Humalog Normal Insulin Sensitivity Corrective Coverage, recommend continue    Recent Glucose Results:   Lab Results   Component Value Date/Time     (H) 09/15/2020 04:17 AM    GLUCPOC 135 (H) 09/15/2020 06:09 AM    GLUCPOC 141 (H) 09/15/2020 02:29 AM    GLUCPOC 123 (H) 09/14/2020 05:44 PM     Alton Yee MS, RN, CDE  Glycemic Control Team  [phone]  Pager [phone] (M-TH 8:00-4:30P)  *After Hours pager [phone]

## 2020-09-15 NOTE — PROGRESS NOTES
Hospitalist Progress Note-critical care note     Patient: Alma Lilly MRN: [id number]  CSN: [id number]    YOB: 1950  Age: 72 y.o.   Sex: female    DOA: 9/9/2020 LOS:  LOS: 6 days            Chief complaint: Methadone overuse hypoxia, prolonged QT bradycardia encephalopathy    Assessment/Plan         Hospital Problems  Never Reviewed          Codes Class Noted POA    Diastolic congestive heart failure (HCC) ICD-10-CM: I50.30  ICD-9-CM: 428.30, 428.0  9/13/2020 Yes        Nonrheumatic mitral valve regurgitation ICD-10-CM: I34.0  ICD-9-CM: 424.0  9/13/2020 Yes        Hepatosplenomegaly ICD-10-CM: R16.2  ICD-9-CM: 571.8  9/13/2020 Yes        Pancytopenia ([facility] ICD-10-CM: Y52.213  ICD-9-CM: 284.19  9/12/2020 Yes        Abnormal echocardiogram ICD-10-CM: R93.1  ICD-9-CM: 793.2  9/11/2020 Yes        Acute on chronic respiratory failure with hypoxia and hypercapnia (HCC) ICD-10-CM: J96.21, J96.22  ICD-9-CM: 518.84, 786.09, 799.02  9/11/2020 Yes        Thrombocytopenia ([facility] ICD-10-CM: D69.6  ICD-9-CM: 287.5  9/10/2020 Yes        Hypercarbia ICD-10-CM: R06.89  ICD-9-CM: 786.09  9/9/2020 Yes        Hypoxia ICD-10-CM: R09.02  ICD-9-CM: 799.02  9/9/2020 Yes        * (Principal) Methadone overdose ([facility] ICD-10-CM: T40.3X1A  ICD-9-CM: 965.02, E980.0  9/9/2020 Yes        LIONEL (acute kidney injury) ([facility] ICD-10-CM: N17.9  ICD-9-CM: 584.9  9/9/2020 Yes        Prolonged Q-T interval on ECG ICD-10-CM: R94.31  ICD-9-CM: 794.31  9/9/2020 Yes        Bradycardia ICD-10-CM: R00.1  ICD-9-CM: 427.89  9/9/2020 Yes        Encephalopathy acute ICD-10-CM: G93.40  ICD-9-CM: 348.30  9/9/2020 Yes              Acute respiratory failure with  Hypercarbia and hypoxia   Continue bipap at night if can tolerate   Due to drug overdose  Weaning nc o2 as tolerated           Prolong Q-T and bradycardia   Repeated ekg Am no long QT indicated  Methadone was put on last night-will decrease 15  Due to jessee   Cardiologist on board   No ADRIANNA needed at this point. Methadone overdose   Alert and oriented today, will recall psych for evaluation-case discussed with Dr. Negra Leal monitoring ,   Optimize electrolytes   Narcan as needed  Sitvince, abdias precaution   Appreciated psychiatrist on board, will reevaluate pt before d/c        Acute encephalopathy  Alert and follow the command   Ct head no acute issue on admission  mri brain ordered-not  done       [facility]   Resolved    continue replace electrolytes as needed       DM type II   Ssi        Anemia and thrombocytopenia -chronic related cirrhosis   So far stable   Appreciated Hematologist on board       Hx of cirrhosis   Ammonia level was wnl     Subjective: feel fine     rn :still agitated   ,  Disposition :tbd,   Review of systems:  General: no f/c   Lung: sob is better, no wheezing  Heart : no chest pain, no palpitation   Gi :no n/v     Vital signs/Intake and Output:  Visit Vitals  BP (!) 135/53   Pulse (!) 47   Temp 97.8 °F (36.6 °C)   Resp 26   Ht 5' 2\" (1.575 m)   Wt 80.7 kg (177 lb 14.6 oz)   SpO2 100%   BMI 32.54 kg/m²     Current Shift:  09/15 0701 - 09/15 1900  In: 650 [P.O.:500; I.V.:150]  Out: -   Last three shifts:  09/13 1901 - 09/15 0700  In: 477 [I.V.:875]  Out: 2100 [Urine:2100]    Physical Exam:  General: WD, WN. No acute distress   HEENT: NC, Atraumatic. PERRLA, anicteric sclerae. Breathing mask noted    Lungs: CTA Bilaterally. No Wheezing/Rhonchi/Rales. Heart:  Hari ,  No murmur, No Rubs, No Gallops  Abdomen: Soft, Non distended, Non tender. +Bowel sounds,   Extremities: No c/c/e  Psych:   Not anxious or agitated.   Neurologic:   No acute neuro deficit, tremor noted ,sleepy           Labs: Results:       Chemistry Recent Labs     09/15/20  0417 09/14/20  0520 09/13/20  0420   * 109* 134*    143 142   K 3.7 4.2 4.5    108 109   CO2 32 31 28   BUN 37* 32* 24*   CREA 0.89 0.80 0.79   CA 9.2 9.3 8.8   AGAP 4 4 5   BUCR 42* 40* 30*   AP 80 85 92   TP 6. 3* 6.4 6.1*   ALB 3.4 3.2* 3.0*   GLOB 2.9 3.2 3.1   AGRAT 1.2 1.0 1.0      CBC w/Diff Recent Labs     09/15/20  0417 09/14/20  0520 09/13/20  0420   WBC 1.3* 1.8* 1.4*   RBC 3.53* 3.83* 3.63*   HGB 9.5* 10.4* 9.7*   HCT 28.9* 31.8* 30.7*   PLT 49* 51* 49*   GRANS 78* 79* 84*   LYMPH 14* 15* 11*   EOS 0 0 1      Cardiac Enzymes Recent Labs     09/15/20  0417   *      Coagulation Recent Labs     09/14/20 0520 09/13/20 0420   PTP 18.1* 17.4*   INR 1.5* 1.5*       Lipid Panel No results found for: CHOL, CHOLPOCT, CHOLX, CHLST, CHOLV, 629605, HDL, HDLP, LDL, LDLC, DLDLP, 019058, VLDLC, VLDL, TGLX, TRIGL, TRIGP, TGLPOCT, CHHD, CHHDX   BNP No results for input(s): BNPP in the last 72 hours. Liver Enzymes Recent Labs     09/15/20  0417   TP 6.3*   ALB 3.4   AP 80      Thyroid Studies Lab Results   Component Value Date/Time    TSH 2.38 09/12/2020 04:41 AM        Procedures/imaging: see electronic medical records for all procedures/Xrays and details which were not copied into this note but were reviewed prior to creation of Plan    Ct Head Wo Cont    Result Date: 9/9/2020  EXAM: CT HEAD WO CONT CLINICAL INDICATION/HISTORY: ams COMPARISON: None. TECHNIQUE: Axial CT imaging of the head was performed without intravenous contrast. Sagittal and coronal reconstructions are provided. One or more dose reduction techniques were used on this CT: automated exposure control, adjustment of the mAs and/or kVp according to patient size, and iterative reconstruction techniques. The specific techniques used on this CT exam have been documented in the patient's electronic medical record.  Digital Imaging and Communications in Medicine (DICOM) format image data are available to nonaffiliated external healthcare facilities or entities on a secure, media free, reciprocally searchable basis with patient authorization for at least a 12-month period after this study _______________ FINDINGS: BRAIN AND POSTERIOR FOSSA: The sulci, folia, ventricles and basal cisterns are within normal limits for the patient's age. There is no intracranial hemorrhage, mass effect, or midline shift. There are scattered and confluent foci of decreased attenuation in the periventricular white matter which are nonspecific but most likely sequelae of chronic microvascular disease. EXTRA-AXIAL SPACES AND MENINGES: There are no abnormal extra-axial fluid collections. CALVARIUM: Intact. SINUSES: Clear. OTHER: Prior bilateral lens replacements. _______________     IMPRESSION: No acute intracranial abnormalities. Xr Chest Port    Result Date: 9/9/2020  EXAM: XR CHEST PORT CLINICAL INDICATION/HISTORY: OD   > Additional: Altered mental status COMPARISON: Correlation with rib series dated August 23, 2020 TECHNIQUE: Portable chest FINDINGS: SUPPORT DEVICES: None. HEART AND MEDIASTINUM: Normal size and contour. Normal pulmonary vasculature. LUNGS AND PLEURAL SPACES: Thin band of subsegmental atelectasis or scarring at the right lung base, unchanged. The lungs are otherwise well expanded and clear. No focal consolidation, effusion, or pneumothorax. BONY THORAX AND SOFT TISSUES: No acute osseous abnormality. IMPRESSION: No active cardiopulmonary disease. Xr Ribs Rt W Pa Cxr Min 3 V    Result Date: 8/23/2020  EXAM: Frontal view of the chest and 3 views of the right ribs CLINICAL INDICATION/HISTORY: Right rib pain COMPARISON: Chest radiograph dated 7/20/2011  FINDINGS: Linear opacity identified at the right lung base. No pleural effusion or pneumothorax identified. Questionable subtle cortical irregularity involving the lateral eighth and possibly ninth ribs. No other displaced rib fractures identified. IMPRESSION: 1. Equivocal findings of nondisplaced lateral right eighth and ninth rib fractures. No other rib fractures identified. 2. Linear atelectasis at the right lung base. No pleural effusion or pneumothorax identified.       Carmelita Portillo MD

## 2020-09-15 NOTE — PROGRESS NOTES
1910 - Bedside and Verbal shift change report given to Akila Villalta RN (oncoming nurse) by Nisha Ramos RN (offgoing nurse). Report included the following information SBAR, Kardex, ED Summary, Procedure Summary, Intake/Output, MAR, Recent Results, Med Rec Status and Cardiac Rhythm NSR.     2000 - Shift assessment completed. Patient alert, agitated/restless, able to answer orientation questions. Patient is diaphoretic and thrawshing from side to side in bed. Patient complaining of neck and wrist pain (from bilateral wrist restraints in place to maintain lines). Patient has pulled out 1 of 2 IV sites at this time. PRN orders received from Dr Eze Hernández for Morphine. 2040 - Patient remains extremely agitated and restless, Dr Eze Hernández asked to come to bedside to assess patient. 2059 - Orders placed by Dr Eze Hernández to resume patients PTA medication; Methadone. 2115 - Orders received by Dr Eze Hernández to modify PRN Catapres parameters. 0000 - Reassessment completed, patient resting quietly in bed with few periods of restlessness. 0400 - Reassessment completed, no changes to previous assessment. 0730 - Bedside and Verbal shift change report given to Nisha Ramos RN (oncoming nurse) by Akila Villalta RN (offgoing nurse). Report included the following information SBAR, Kardex, ED Summary, Procedure Summary, Intake/Output, MAR, Recent Results, Med Rec Status and Cardiac Rhythm Sinus Particia Durand.

## 2020-09-15 NOTE — PROGRESS NOTES
Physician Progress Note      Tim Narayanan  CSN #:                  [id number]  :                       1950  ADMIT DATE:       2020 4:23 PM  [address] [place] DATE:  RESPONDING  PROVIDER #:        Shira BLISS MD          QUERY TEXT:    Dear Hospitalist:    Patient admitted with methadone overdose Documentation reflects acute encephalopathy toxic in note in H&P  . If possible, please document in the progress notes and discharge summary if Toxic encephalopathy was: The medical record reflects the following:  Risk Factors: methadone overdose  Clinical Indicators: methadone over dose, unresponsive on admission , + for THC , Hypoxic  Treatment: in restraints, , ct to head, narcan , ICU    Thank- Joselito Bernal RN [facility] [phone]  Options provided:  -- Toxic encephalopathy ruled out after study  -- Toxic encephalopathy  treated and resolved  -- Toxic encephalopathy confirmed after study  -- Other - I will add my own diagnosis  -- Disagree - Not applicable / Not valid  -- Disagree - Clinically unable to determine / Unknown  -- Refer to Clinical Documentation Reviewer    PROVIDER RESPONSE TEXT:    Toxic encephalopathy confirmed after study.     Query created by: Butch Nam on 9/15/2020 11:58 AM      Electronically signed by:  Shira Del Cid MD 9/15/2020 3:33 PM

## 2020-09-15 NOTE — PROGRESS NOTES
Chart reviewed pt remains in ICU level of care, per chart pt more alert today,cardio remains on case at this time, psych eval pending at this time, cm will cont to review for d/c psych recommendations. Will need covid testing if placement is indicated.

## 2020-09-15 NOTE — PROGRESS NOTES
Pulmonary, Critical Care, and Sleep Medicine     Pulmonary Progress Note    Name: Cedrick Cerda   : 1950   MRN: [id number]   Date: 9/15/2020    [x]I have reviewed the flowsheet and previous days notes. Events, vitals, medications and notes from last 24 hours reviewed. Care plan discussed on multidisciplinary rounds. IMPRESSION:   Patient Active Problem List   Diagnosis Code    Acute on chronic respiratory failure with hypoxia and hypercapnia (HCC) J96.21, J96.22    Encephalopathy acute G93.40    Methadone overdose ([facility] Z68.8R1F    Diastolic congestive heart failure (HCC) I50.30    Prolonged Q-T interval on ECG R94.31    Bradycardia R00.1    Abnormal echocardiogram R93.1    LIONEL (acute kidney injury) ([facility] [address] N17.9    Rhabdomyolysis M62.82    Thrombocytopenia (HCC) D69.6    Pancytopenia, chronic (HCC) D61.818    Cirrhosis of liver K74.60    Pronounced biliary ductal dilatation     Hepatosplenomegaly R16.2    Nonrheumatic mitral valve regurgitation I34.0    RLS G25.81    Former smoker Z87.891    PTSD F43.1    Bipolar disorder F31    Multiple substance use disorder - cannabis, cocaine, heroin, ETOH F19      PLAN:   Respi: Continue BiPAP at night if aggreable- adjust settings per goal SPO2 > 91%  Supplemental O2 at 2 Lpm via NC during daytime, titrate flow for goal SPO2> 91%  Taper steroid today  Aspiration prevention bundle, head of the bed at 30' all times  PFT and PSG as out patient with regular pulmonologist at [place] recommended  Bronchodilators, pulmonary hygiene care  ID: No clinical evidence of sepsis  Antibiotic choice: stop Clindamycin- CXR in AM stable, nil acute  Blood Cultures NGTD. Non reactive HIV and positive Hep C Ab  Fluids: D5NS at 25 mL/hr.  May stop once PO intake optimal  Cardio: Monitor HR; cardiology suspects bradycardia medication induced; hemodynamics stable  Unfortunately with withdrawal symptoms started back on lo dose Methadone last night  Periodic EKG per cardiology to monitor QTc in AM  Diuresis with low dose Lasix at 20 mg as tolerated  Heme: Smear reviewed by hematology. No plt clumping, no schistocytes, no hypochromia, no bandemia or other signs of infection per hematology. Given alcohol abuse, cirrhosis and chronic thrombocytopenia in the setting of normal renal function, unlikely TTP per hematology. Normal fibrinogen on DIC panel, mild INR felt to be 2nd to cirrhosis. Monitor CBC daily  Renal: Monitor renal functions and UO  CK improving  Replace lytes per unit protocol as needed  GI: diet as tolerated per swallow wvaluation  Normal LFT and hepatosplenomegaly related hematological findings  No clinical evidence of acute cholecystitis or acute abdomen  Endo: Glycemic control. Normal TSH  Neuro: MRI could not be completed as patient became restless on table. Patient appears improved gradually, admitted overdosing methadone; remained off of Precedex hence unclear if MRI may add more information  May get repeat CT head per clinical course  Normal B12, Folate, iron, Ammonia  Psych: Appreciate psych input regarding methadone and other meds  Sitter for any suicidal risk continue per psych. Patient declined any suicidal ideation but has chronic major depression. Reported at bedside that she took additional dose of Methadone since she missed dose previous day  Will defer respective systems problem management to primary and other consultant and follow patient in ICU with primary and other medical team  Quality Care: PPI, DVT prophylaxis, HOB elevated, Infection control all reviewed and addressed. PAIN AND SEDATION: had fall few weeks back at home reportedly. Judicious opiate use  · Skin/Wound: bruise on LT thigh from recent fall  · Nutrition: diet  · Prophylaxis: DVT [Heparin] and GI [PPI] Prophylaxis reviewed.    · Restraints: as needed to minimize interruption in medical care and to avoid patient pulling out lines, cables, catheters  · PT/OT eval and treat: as needed when stable   · Lines/Tubes: lines PIV  ADVANCE DIRECTIVE: full code. DISCUSSION: spoke with patient, RN, RT  Events and notes from last 24 hours reviewed. Care plan discussed with nursing                  HPI:   72 y.o. female with depression, heroin abuse on methadone came to ER with EMS due to mental status changes, became confused and suspected taking additional methadone. She was given 1.5 mg Narcan per EMS, her mental status and confusion got improving. She received another 2 mg Narcan in our ER, she become more awake and alert. In ER, noted with hypercapnia, bradycardia, EKG with prolonged QT. CT head no acute proces      09/15/20  Subjective:   Patient awake, alert, O x 3, calm in bed sitting up, following all commands, on O2 via NC  Developed some withdrawal symptoms overnight requiring Methadone but caused bradycardia  Hemodynamically stable. HR on monitor in 50's at present  Declined BiPAP last night. ANGEL chronic and stable per patient; denies cough, chest pain, wheezing or hemoptysis  Denies any palpitations, syncope, orthopnea, edema or paroxysmal nocturnal dyspnea  Denies abdominal or flank pain, anorexia, nausea or vomiting, dysphagia, change in bowel habits or black or bloody stools  Afebrile. Good urine output. Tolerating some PO diet  Reportd missed Methadone day prior and took additional dose on day of event. Not on any new opiate pain meds  I was not contacted by staff on anything about patient overnight.      ROS:   General ROS: negative for  - fever, chills, weight loss, fatigue and malaise  Cardiovascular ROS: negative for - chest pain, edema, murmur, orthopnea, palpitations or pnd  Gastrointestinal ROS: no nausea, vomiting, abdominal pain, change in bowel habits, or black or bloody stools  Dermatological ROS: negative for - pruritus, rash or skin lesion changes       Past Medical History:  Past Medical History:   Diagnosis Date    Chronic back pain     Diabetes ([facility] [address]     Drug abuse ([facility]     Hepatic cirrhosis ([facility] [address]     Hepatitis C     [facility]     Pain management     Restless leg syndrome     Sciatica     Spleen enlarged     Thyroid disease         Allergy:  Allergies   Allergen Reactions    Erythromycin Hives    Penicillins Hives    Tetracycline Hives        Vital Signs:    Blood pressure (!) 157/74, pulse (!) 54, temperature 97.8 °F (36.6 °C), resp. rate 12, height 5' 2\" (1.575 m), weight 80.7 kg (177 lb 14.6 oz), SpO2 99 %. Body mass index is 32.54 kg/m². O2 Device: Nasal cannula   O2 Flow Rate (L/min): 2 l/min   Temp (24hrs), Av.7 °F (36.5 °C), Min:97.5 °F (36.4 °C), Max:98 °F (36.7 °C)         Intake/Output:   Last shift:      09/15 07 - 09/15 1900  In: 227 [P.O.:500; I.V.:150]  Out: -   Last 3 shifts: 1901 - 09/15 0700  In: 875 [I.V.:875]  Out: 2100 [Urine:2100]    Intake/Output Summary (Last 24 hours) at 9/15/2020 1217  Last data filed at 9/15/2020 1154  Gross per 24 hour   Intake 1124.17 ml   Output --   Net 1124.17 ml       Physical Exam:  General: AAO x 3, slow to respond, in no respiratory distress, calm, appears older than stated age, on O2 via NC  HEENT: PERRLA, EOMI, throat normal without erythema or exudate, has denture  Neck: No abnormally enlarged lymph nodes or thyroid, supple  Chest: normal  Lungs: moderate air entry, clear to auscultation bilaterally, normal percussion bilaterally, no tenderness/ rash  Heart: Regular rate and rhythm, S1S2 present or without murmur or extra heart sounds  Abdomen: protuberant, bowel sounds normoactive, tympanic, soft without significant tenderness, masses, organomegaly or guarding, rigidity, rebound  Extremity: negative edema, cyanosis, clubbing; old bruise on LT thigh  Capillary refill: normal  Neuro: AAO x 3, hand shaking when trying to reach anything in bed, moves all extremities well, exam limitations  Skin: Skin color, texture, turgor fair.  Skin dry, warm, non-diaphoretic    DATA:   Current Facility-Administered Medications   Medication Dose Route Frequency    methadone (DOLOPHINE) tablet 15 mg  15 mg Oral QHS    albuterol-ipratropium (DUO-NEB) 2.5 MG-0.5 MG/3 ML  3 mL Nebulization Q6H PRN    morphine injection 2 mg  2 mg IntraVENous Q4H PRN    cloNIDine HCL (CATAPRES) tablet 0.1 mg  0.1 mg Oral Q4H PRN    LORazepam (ATIVAN) injection 1 mg  1 mg IntraVENous Q2H PRN    heparin (porcine) injection 5,000 Units  5,000 Units SubCUTAneous Q8H    methylPREDNISolone (PF) (SOLU-MEDROL) injection 20 mg  20 mg IntraVENous Q8H    furosemide (LASIX) injection 20 mg  20 mg IntraVENous DAILY    dextrose 5% and 0.9% NaCl infusion  25 mL/hr IntraVENous CONTINUOUS    ELECTROLYTE REPLACEMENT PROTOCOL - Phosphorus  Standard Dosing  1 Each Other PRN    nicotine (NICODERM CQ) 14 mg/24 hr patch 1 Patch  1 Patch TransDERmal DAILY    budesonide (PULMICORT) 500 mcg/2 ml nebulizer suspension  500 mcg Nebulization BID RT    promethazine (PHENERGAN) 12.5 mg in 0.9% sodium chloride 50 mL IVPB  12.5 mg IntraVENous Q6H PRN    arformoteroL (BROVANA) neb solution 15 mcg  15 mcg Nebulization BID RT    sodium chloride (NS) flush 5-40 mL  5-40 mL IntraVENous Q8H    sodium chloride (NS) flush 5-40 mL  5-40 mL IntraVENous PRN    acetaminophen (TYLENOL) tablet 650 mg  650 mg Oral Q6H PRN    Or    acetaminophen (TYLENOL) suppository 650 mg  650 mg Rectal Q6H PRN    bisacodyL (DULCOLAX) suppository 10 mg  10 mg Rectal DAILY PRN    promethazine (PHENERGAN) tablet 12.5 mg  12.5 mg Oral Q6H PRN    Or    ondansetron (ZOFRAN) injection 4 mg  4 mg IntraVENous Q6H PRN    naloxone (NARCAN) injection 0.4 mg  0.4 mg IntraVENous EVERY 2 MINUTES AS NEEDED    insulin lispro (HUMALOG) injection   SubCUTAneous Q6H    glucose chewable tablet 16 g  4 Tab Oral PRN    glucagon (GLUCAGEN) injection 1 mg  1 mg IntraMUSCular PRN    ELECTROLYTE REPLACEMENT PROTOCOL - Potassium Standard Dosing   1 Each Other PRN    ELECTROLYTE REPLACEMENT PROTOCOL - Magnesium   1 Each Other PRN    ELECTROLYTE REPLACEMENT PROTOCOL - Calcium   1 Each Other PRN    pantoprazole (PROTONIX) 40 mg in 0.9% sodium chloride 10 mL injection  40 mg IntraVENous Q24H       Telemetry: [x]Sinus []A-flutter []Paced    []A-fib []Multiple PVCs                    Labs:  Recent Results (from the past 24 hour(s))   GLUCOSE, POC    Collection Time: 09/14/20 12:41 PM   Result Value Ref Range    Glucose (POC) 128 (H) 70 - 110 mg/dL   GLUCOSE, POC    Collection Time: 09/14/20  5:44 PM   Result Value Ref Range    Glucose (POC) 123 (H) 70 - 110 mg/dL   GLUCOSE, POC    Collection Time: 09/15/20  2:29 AM   Result Value Ref Range    Glucose (POC) 141 (H) 70 - 110 mg/dL   MAGNESIUM    Collection Time: 09/15/20  4:17 AM   Result Value Ref Range    Magnesium 2.1 1.6 - 2.6 mg/dL   CALCIUM, IONIZED    Collection Time: 09/15/20  4:17 AM   Result Value Ref Range    Ionized Calcium 1.17 1.12 - 1.32 MMOL/L   PHOSPHORUS    Collection Time: 09/15/20  4:17 AM   Result Value Ref Range    Phosphorus 3.6 2.5 - 4.9 MG/DL   CBC WITH AUTOMATED DIFF    Collection Time: 09/15/20  4:17 AM   Result Value Ref Range    WBC 1.3 (L) 4.6 - 13.2 K/uL    RBC 3.53 (L) 4.20 - 5.30 M/uL    HGB 9.5 (L) 12.0 - 16.0 g/dL    HCT 28.9 (L) 35.0 - 45.0 %    MCV 81.9 74.0 - 97.0 FL    MCH 26.9 24.0 - 34.0 PG    MCHC 32.9 31.0 - 37.0 g/dL    RDW 15.6 (H) 11.6 - 14.5 %    PLATELET 49 (L) 852 - 420 K/uL    MPV 9.6 9.2 - 11.8 FL    NEUTROPHILS 78 (H) 40 - 73 %    LYMPHOCYTES 14 (L) 21 - 52 %    MONOCYTES 8 3 - 10 %    EOSINOPHILS 0 0 - 5 %    BASOPHILS 0 0 - 2 %    ABS. NEUTROPHILS 1.0 (L) 1.8 - 8.0 K/UL    ABS. LYMPHOCYTES 0.2 (L) 0.9 - 3.6 K/UL    ABS. MONOCYTES 0.1 0.05 - 1.2 K/UL    ABS. EOSINOPHILS 0.0 0.0 - 0.4 K/UL    ABS.  BASOPHILS 0.0 0.0 - 0.1 K/UL    DF AUTOMATED     METABOLIC PANEL, COMPREHENSIVE    Collection Time: 09/15/20  4:17 AM   Result Value Ref Range    Sodium 144 136 - 145 mmol/L    Potassium 3.7 3.5 - 5.5 mmol/L Chloride 108 100 - 111 mmol/L    CO2 32 21 - 32 mmol/L    Anion gap 4 3.0 - 18 mmol/L    Glucose 128 (H) 74 - 99 mg/dL    BUN 37 (H) 7.0 - 18 MG/DL    Creatinine 0.89 0.6 - 1.3 MG/DL    BUN/Creatinine ratio 42 (H) 12 - 20      GFR est AA >60 >60 ml/min/1.73m2    GFR est non-AA >60 >60 ml/min/1.73m2    Calcium 9.2 8.5 - 10.1 MG/DL    Bilirubin, total 0.8 0.2 - 1.0 MG/DL    ALT (SGPT) 48 13 - 56 U/L    AST (SGOT) 74 (H) 10 - 38 U/L    Alk. phosphatase 80 45 - 117 U/L    Protein, total 6.3 (L) 6.4 - 8.2 g/dL    Albumin 3.4 3.4 - 5.0 g/dL    Globulin 2.9 2.0 - 4.0 g/dL    A-G Ratio 1.2 0.8 - 1.7     CK    Collection Time: 09/15/20  4:17 AM   Result Value Ref Range     (H) 26 - 192 U/L   GLUCOSE, POC    Collection Time: 09/15/20  6:09 AM   Result Value Ref Range    Glucose (POC) 135 (H) 70 - 110 mg/dL   EKG, 12 LEAD, SUBSEQUENT    Collection Time: 09/15/20  6:13 AM   Result Value Ref Range    Ventricular Rate 53 BPM    Atrial Rate 53 BPM    P-R Interval 162 ms    QRS Duration 98 ms    Q-T Interval 482 ms    QTC Calculation (Bezet) 452 ms    Calculated P Axis 68 degrees    Calculated R Axis 29 degrees    Calculated T Axis 49 degrees    Diagnosis       Sinus bradycardia  Otherwise normal ECG  When compared with ECG of 12-SEP-2020 19:47,  Vent.  rate has increased BY  18 BPM             Recent Labs     09/13/20  0554 09/12/20  1318   FIO2I 0.30 40   HCO3I 30.7* 26.8*   PCO2I 53.8* 47.0*   PHI 7.37 7.36   PO2I 91 142*       All Micro Results     Procedure Component Value Units Date/Time    CULTURE, BLOOD [[id number]] Collected:  09/11/20 0955    Order Status:  Completed Specimen:  Blood Updated:  09/15/20 0555     Special Requests: NO SPECIAL REQUESTS        Culture result: NO GROWTH 4 DAYS       CULTURE, BLOOD [[id number]] Collected:  09/11/20 1010    Order Status:  Completed Specimen:  Blood Updated:  09/15/20 0555     Special Requests: NO SPECIAL REQUESTS        Culture result: NO GROWTH 4 DAYS             9/10/20 ECHO · LV: Estimated LVEF is 60 - 65%. Normal cavity size, systolic function (ejection fraction normal) and diastolic function. Mild concentric hypertrophy. E/E' ratio is 7.55.  · LA: Moderately dilated left atrium. · RV: Mildly dilated right ventricle. · RA: Mildly dilated right atrium. · MV: Mitral valve non-specific thickening. Mild mitral annular calcification. Moderate mitral valve regurgitation is present. · TV: Non-specific thickening of the tricuspid valve. Moderate tricuspid valve regurgitation is present. · PA: Mild pulmonary hypertension. Pulmonary arterial systolic pressure is 44 mmHg. There is a small thickening /degenerative changes on tip of anterior leaflet of the tricuspid valve on ventricular side, without independent movement. I cannot exclude possible small vegetation. No previous echocardiogram done at [facility] on 10/11/2019 images available to compare. Imaging:  [x]I have personally reviewed the patients chest radiographs images and report   9/15/20  Results from Hospital Encounter encounter on 09/09/20   XR CHEST PORT    Narrative EXAM: XR CHEST PORT    CLINICAL INDICATION/HISTORY: Pulmonary infiltrates  -Additional: None    COMPARISON: Several prior studies, most recently 9/12/2020    TECHNIQUE: Frontal view of the chest    _______________    FINDINGS:    HEART AND MEDIASTINUM: Stable appearing cardiac size and mediastinal contours. LUNGS AND PLEURAL SPACES: Lungs are mildly underexpanded from prior days exam.  No focal pneumonic opacity. No evidence of pneumothorax or pleural effusion. BONY THORAX AND SOFT TISSUES: No acute osseous abnormality    _______________      Impression IMPRESSION:    Mild interval pulmonary hypoinflation without superimposed acute radiographic  abnormality.       9/12/20  Results from Hospital Encounter encounter on 09/09/20   CT HEAD WO CONT    Narrative _______________    EXAM: CT HEAD WO CONT.  _______________    CLINICAL INDICATION/HISTORY: Bradycardia and neuro fluctuations.  -Additional: None. COMPARISON: CT of 09/09/2020.  _______________    TECHNIQUE/COMPARISON: Nonenhanced CT examination of the head was performed. Sagittal and coronal series were reformatted from the axial source images. One or more dose reduction techniques were used on this CT: automated exposure  control, adjustment of the mAs and/or kVp according to patient size, and  iterative reconstruction techniques. The specific techniques used on this CT  exam have been documented in the patient's electronic medical record. Digital  Imaging and Communications in Medicine (DICOM) format image data are available  to nonaffiliated external healthcare facilities or entities on a secure, media  free, reciprocally searchable basis with patient authorization for at least a  12-month period after this study. _______________    FINDINGS:    BRAIN AND POSTERIOR FOSSA: There is no evidence of acute vascular territorial  ischemia, intracranial hemorrhage, midline shift or mass effect. Periventricular  and subcortical white matter hypoattenuation is most compatible with chronic  microangiopathy. Mild ventricular and sulcal prominence represents age-related  involutional changes and volume loss. EXTRA-AXIAL SPACES: There are no abnormal extra-axial fluid collections. CALVARIA AND SOFT TISSUES: No acute calvarial or extracalvarial soft tissue  findings of concern. OTHER: The visualized paranasal sinuses are essentially clear, as are the  mastoid air cells bilaterally. _______________      Impression IMPRESSION:    No acute findings or appreciable change from 09/09/2020.    _______________        9/9/20  CT head  IMPRESSION:  No acute intracranial abnormalities      9/12/20 US ABD   IMPRESSION:   1. Cirrhotic hepatic morphology, as described above. 2.  Pronounced splenomegaly. 3.  Pronounced biliary ductal dilatation.  Correlation with the patient's liver function tests is suggested. MRCP could be performed, as clinically warranted for further evaluation. PVL BL LE 9/12/20  · No evidence of deep vein thrombosis in the right lower extremity. · No evidence of deep vein thrombosis in the left lower extremity. [x]See my orders for details    My assessment, plan of care, findings, medications, side effects etc were discussed with:  [x]nursing []PT/OT    []respiratory therapy []Dr. Maranda Mujica [x]Patient     [x]Total critical care time exclusive of procedures 36 minutes with complex decision making performed and > 50% time spent in face to face evaluation.     Janelle Priest MD

## 2020-09-15 NOTE — BH NOTES
[address]    Name:  Bruna Mohr  MR#:   [id number]  :  1950  ACCOUNT #:  [de-identified]  DATE OF SERVICE:  2020      DATE OF INITIAL CONSULT:  2020    DATE OF FOLLOWUP:  09/15/2020    REASON FOR CONSULTATION:  Methadone overdose. INTERIM HISTORY:  The patient is a 76-year-old female with a history of PTSD, bipolar disorder, depression, opioid use disorder, who was admitted after intentional overdose on methadone. Chart reviewed . Today, she is more awake and alert, but somewhat restless in the bed. She is oriented to the place and person. She denies over dose as suicide attempt, but appears unreliable due to ongoing delirium presentation. She did say that at some point she wanted to die, but continues to feel very depressed still. She reports that she is feeling very hungry that she has not eaten in four days and asking for food. She denies having any hallucinations or delusions. She denies having any thoughts of homicidal ideations. Reviewed the patient's chart and ongoing treatment. VITAL SIGNS:  Blood pressure (!) 180/70, pulse (!) 55, temperature 97.8 °F (36.6 °C), resp. rate 11, height 5' 2\" (1.575 m), weight 80.7 kg (177 lb 14.6 oz), SpO2 100 %.     Allergies   Allergen Reactions    Erythromycin Hives    Penicillins Hives    Tetracycline Hives       Current Facility-Administered Medications:     methadone (DOLOPHINE) tablet 15 mg, 15 mg, Oral, QHS, Horacio Viveros MD    albuterol-ipratropium (DUO-NEB) 2.5 MG-0.5 MG/3 ML, 3 mL, Nebulization, Q6H PRN, Mathew ALVARENGA MD    methylPREDNISolone (PF) (SOLU-MEDROL) injection 20 mg, 20 mg, IntraVENous, Q12H, Mathew ALVARENGA MD    morphine injection 2 mg, 2 mg, IntraVENous, Q4H PRN, José Luis ROJO MD, 2 mg at 20    cloNIDine HCL (CATAPRES) tablet 0.1 mg, 0.1 mg, Oral, Q4H PRN, José Luis ROJO MD, 0.1 mg at 20    LORazepam (ATIVAN) injection 1 mg, 1 mg, IntraVENous, Q2H PRN, Cristhian King MD, 1 mg at 09/14/20 2153    heparin (porcine) injection 5,000 Units, 5,000 Units, SubCUTAneous, Q8H, Masoud Herrera MD, 5,000 Units at 09/15/20 0856    furosemide (LASIX) injection 20 mg, 20 mg, IntraVENous, DAILY, Masoud Herrera MD, 20 mg at 09/15/20 0856    dextrose 5% and 0.9% NaCl infusion, 25 mL/hr, IntraVENous, CONTINUOUS, Masoud Herrera MD, Last Rate: 25 mL/hr at 09/15/20 0552, 25 mL/hr at 09/15/20 0552    ELECTROLYTE REPLACEMENT PROTOCOL - Phosphorus  Standard Dosing, 1 Each, Other, PRN, Masoud Herrera MD    nicotine (NICODERM CQ) 14 mg/24 hr patch 1 Patch, 1 Patch, TransDERmal, DAILY, Masoud Herrera MD, 1 Patch at 09/15/20 0858    budesonide (PULMICORT) 500 mcg/2 ml nebulizer suspension, 500 mcg, Nebulization, BID RT, Masoud Herrera MD, 500 mcg at 09/15/20 0729    promethazine (PHENERGAN) 12.5 mg in 0.9% sodium chloride 50 mL IVPB, 12.5 mg, IntraVENous, Q6H PRN, Masoud Herrera MD, Last Rate: 200 mL/hr at 09/1950, 12.5 mg at 09/1950    arformoteroL (BROVANA) neb solution 15 mcg, 15 mcg, Nebulization, BID RT, Masoud Herrera MD, 15 mcg at 09/15/20 0729    sodium chloride (NS) flush 5-40 mL, 5-40 mL, IntraVENous, Q8H, Haja Mejia MD, 10 mL at 09/15/20 1319    sodium chloride (NS) flush 5-40 mL, 5-40 mL, IntraVENous, PRN, Haja Mejia MD, 10 mL at 09/09/20 2022    acetaminophen (TYLENOL) tablet 650 mg, 650 mg, Oral, Q6H PRN **OR** acetaminophen (TYLENOL) suppository 650 mg, 650 mg, Rectal, Q6H PRN, Haja Mejia MD    bisacodyL (DULCOLAX) suppository 10 mg, 10 mg, Rectal, DAILY PRN, Haja Mejia MD    promethazine (PHENERGAN) tablet 12.5 mg, 12.5 mg, Oral, Q6H PRN **OR** ondansetron (ZOFRAN) injection 4 mg, 4 mg, IntraVENous, Q6H PRN, Haja Mejia MD, 4 mg at 09/12/20 0313    naloxone (NARCAN) injection 0.4 mg, 0.4 mg, IntraVENous, EVERY 2 MINUTES AS NEEDED, Haja Mejia MD, 0.4 mg at 09/10/20 1555    insulin lispro (HUMALOG) injection, , SubCUTAneous, Q6H, Mesha Kenyon MD, 2 Units at 09/15/20 1318    glucose chewable tablet 16 g, 4 Tab, Oral, PRN, Mesha Kenyon MD    glucagon [facility]) injection 1 mg, 1 mg, IntraMUSCular, PRN, Mesha Kenyon MD    ELECTROLYTE REPLACEMENT PROTOCOL - Potassium Standard Dosing , 1 Each, Other, PRN, Mesha Kenyon MD    ELECTROLYTE REPLACEMENT PROTOCOL - Magnesium , 1 Each, Other, PRN, Mesha Kenyon MD    ELECTROLYTE REPLACEMENT PROTOCOL - Calcium , 1 Each, Other, PRN, Mesha Kenyon MD    pantoprazole (PROTONIX) 40 mg in 0.9% sodium chloride 10 mL injection, 40 mg, IntraVENous, Q24H, Mesha Kenyon MD, 40 mg at 09/14/20 2114    MENTAL STATUS EXAMINATION:  Appearance: The patient is an obese, elderly [facility] American female who appears much older than stated age. She is lying in the bed somewhat restless, moving her legs. She is alert and oriented x2. Speech:  Her speech is spontaneous, but low volume and tone. Mood:  Her mood is expressed in \"depressed. \"  Affect:  Her affect is tearful and mood congruent. Thought Process:  Appears to be somewhat disorganized. Thought Content:  Passive suicidal ideations voiced. No homicidal ideations. No paranoia. Perceptual Disturbances:  Denies having any hallucinations. Insight is fair. Impulse control is poor. Memory is poor to fair. ASSESSMENT:  The patient was seen for followup. She reported intentional overdose on the methadone and feeling very depressed but today denying any SI or intent to kill herself by OD on Methadone. Her report appears to be changing and not very reliable at this time . She continues to be somewhat denies and disorganized. Given her current presentation, she is medically not stable for inpatient psychiatric admission and appears may not need it.   The patient can be reevaluated in the 24-48 hours to see further improvement, and if there is a need for inpatient psychiatric admission versus outpatient management, recommend continuing the one-to-one safety sitter until seen again. Regarding opioid use and methadone management, given the patient's current condition with bradycardia and high risk of QT prolongation, methadone may not be an appropriate choice and needs to be closely monitored  Recommend continued monitoring for withdrawals using COWS scale and treat her symptomatically. Also as specified in previous note, avoid QT prolonging agents like Lexapro, trazodone that she was taking previously as an outpatient. Need for Psych admission is unlikely at this time, discussed with Dr. Татьяна Bonner,  Plan to re evaluate tomorrow   Psychiatrist on-call is available for any additional questions,  call if needed. Thanks for the consult.       Rob Bernabe MD      [id number]  D:  09/14/2020 13:45  T:  09/14/2020 15:09  JOB #:  [id number]

## 2020-09-15 NOTE — PROGRESS NOTES
0700  Bedside, Verbal and Written shift change report given to KOJO Champagne (oncoming nurse) by Oni Nation (offgoing nurse). Report included the following information SBAR, Kardex, Intake/Output and Recent Results. 0800 Pt assessed. Pt is drowsy, responds to verbal commands, denies pain at this time. Remains tele monitored with sitter at bedside. 56 Mr. Suero Gather at bedside. 1200 Pt status unchanged, sitter at bedside. 1600  No change, pt resting with eyes closed. Sitter at bedside. 1800  Pt agitated, yelling, flopping left and right in bed. PRN meds' given. Dr. Dom Maharaj paged. Orders received. May give Methadone early if needed. 1900  Bedside, Verbal and Written shift change report given to Oni Nation (oncoming nurse) by Yesi Champagne (offgoing nurse). Report included the following information SBAR, Kardex, Intake/Output and Recent Results.

## 2020-09-15 NOTE — PROGRESS NOTES
Cardiology Progress Note        Patient: Yesi Lam        Sex: female          DOA: 9/9/2020  YOB: 1950      Age:  72 y.o.        LOS:  LOS: 6 days   Assessment/Plan     Principal Problem:    Methadone overdose ([address] (9/9/2020)    Active Problems:    Hypercarbia (9/9/2020)      Hypoxia (9/9/2020)      LIONEL (acute kidney injury) ([address] (9/9/2020)      Prolonged Q-T interval on ECG (9/9/2020)      Bradycardia (9/9/2020)      Encephalopathy acute (9/9/2020)      Thrombocytopenia ([address] (9/10/2020)      Abnormal echocardiogram (9/11/2020)      Acute on chronic respiratory failure with hypoxia and hypercapnia (HCC) (9/11/2020)      Pancytopenia ([address] (9/51/8586)      Diastolic congestive heart failure ([address] (9/13/2020)      Nonrheumatic mitral valve regurgitation (9/13/2020)      Hepatosplenomegaly (9/13/2020)        Plan:  bradycardia when sleeping and with methadone overdose. Heart rate improves with activity even on bed. Continue with current supportive treatment for rest of comorbidities. Discussed with patient and RN, Sami Smith. Subjective:    cc:  Bradycardia        REVIEW OF SYSTEMS:    patient denied any cardiac symptoms      Objective:      Visit Vitals  BP (!) 131/53   Pulse (!) 51   Temp 97.8 °F (36.6 °C)   Resp 14   Ht 5' 2\" (1.575 m)   Wt 80.7 kg (177 lb 14.6 oz)   SpO2 99%   BMI 32.54 kg/m²     Body mass index is 32.54 kg/m². Physical Exam:  General Appearance: Comfortable, not using accessory muscles of respiration. NECK: No JVD, no thyroidomeglay. LUNGS: Clear bilaterally. HEART: S1+S2 audible,    ABD: Non-tender, BS Audible    EXT: No edema, and no cysnosis. VASCULAR EXAM: Pulses are intact. PSYCHIATRIC EXAM: Mood is appropriate.     Medication:  Current Facility-Administered Medications   Medication Dose Route Frequency    methadone (DOLOPHINE) tablet 15 mg  15 mg Oral QHS    albuterol-ipratropium (DUO-NEB) 2.5 MG-0.5 MG/3 ML  3 mL Nebulization Q6H PRN    methylPREDNISolone (PF) (SOLU-MEDROL) injection 20 mg  20 mg IntraVENous Q12H    morphine injection 2 mg  2 mg IntraVENous Q4H PRN    cloNIDine HCL (CATAPRES) tablet 0.1 mg  0.1 mg Oral Q4H PRN    LORazepam (ATIVAN) injection 1 mg  1 mg IntraVENous Q2H PRN    heparin (porcine) injection 5,000 Units  5,000 Units SubCUTAneous Q8H    furosemide (LASIX) injection 20 mg  20 mg IntraVENous DAILY    dextrose 5% and 0.9% NaCl infusion  25 mL/hr IntraVENous CONTINUOUS    ELECTROLYTE REPLACEMENT PROTOCOL - Phosphorus  Standard Dosing  1 Each Other PRN    nicotine (NICODERM CQ) 14 mg/24 hr patch 1 Patch  1 Patch TransDERmal DAILY    budesonide (PULMICORT) 500 mcg/2 ml nebulizer suspension  500 mcg Nebulization BID RT    promethazine (PHENERGAN) 12.5 mg in 0.9% sodium chloride 50 mL IVPB  12.5 mg IntraVENous Q6H PRN    arformoteroL (BROVANA) neb solution 15 mcg  15 mcg Nebulization BID RT    sodium chloride (NS) flush 5-40 mL  5-40 mL IntraVENous Q8H    sodium chloride (NS) flush 5-40 mL  5-40 mL IntraVENous PRN    acetaminophen (TYLENOL) tablet 650 mg  650 mg Oral Q6H PRN    Or    acetaminophen (TYLENOL) suppository 650 mg  650 mg Rectal Q6H PRN    bisacodyL (DULCOLAX) suppository 10 mg  10 mg Rectal DAILY PRN    promethazine (PHENERGAN) tablet 12.5 mg  12.5 mg Oral Q6H PRN    Or    ondansetron (ZOFRAN) injection 4 mg  4 mg IntraVENous Q6H PRN    naloxone (NARCAN) injection 0.4 mg  0.4 mg IntraVENous EVERY 2 MINUTES AS NEEDED    insulin lispro (HUMALOG) injection   SubCUTAneous Q6H    glucose chewable tablet 16 g  4 Tab Oral PRN    glucagon (GLUCAGEN) injection 1 mg  1 mg IntraMUSCular PRN    ELECTROLYTE REPLACEMENT PROTOCOL - Potassium Standard Dosing   1 Each Other PRN    ELECTROLYTE REPLACEMENT PROTOCOL - Magnesium   1 Each Other PRN    ELECTROLYTE REPLACEMENT PROTOCOL - Calcium   1 Each Other PRN    pantoprazole (PROTONIX) 40 mg in 0.9% sodium chloride 10 mL injection  40 mg IntraVENous Q24H               Lab/Data Reviewed:  Procedures/imaging: see electronic medical records for all procedures/Xrays   and details which were not copied into this note but were reviewed prior to creation of Plan       All lab results for the last 24 hours reviewed. Recent Labs     09/15/20  0417 09/14/20  0520 09/13/20  0420   WBC 1.3* 1.8* 1.4*   HGB 9.5* 10.4* 9.7*   HCT 28.9* 31.8* 30.7*   PLT 49* 51* 49*     Recent Labs     09/15/20  0417 09/14/20  0520 09/13/20  0420    143 142   K 3.7 4.2 4.5    108 109   CO2 32 31 28   * 109* 134*   BUN 37* 32* 24*   CREA 0.89 0.80 0.79   CA 9.2 9.3 8.8       RADIOLOGY:  CT Results  (Last 48 hours)    None        CXR Results  (Last 48 hours)               09/15/20 0637  XR CHEST PORT Final result    Impression:  IMPRESSION:       Mild interval pulmonary hypoinflation without superimposed acute radiographic   abnormality. Narrative:  EXAM: XR CHEST PORT       CLINICAL INDICATION/HISTORY: Pulmonary infiltrates   -Additional: None       COMPARISON: Several prior studies, most recently 9/12/2020       TECHNIQUE: Frontal view of the chest       _______________       FINDINGS:       HEART AND MEDIASTINUM: Stable appearing cardiac size and mediastinal contours. LUNGS AND PLEURAL SPACES: Lungs are mildly underexpanded from prior days exam.   No focal pneumonic opacity. No evidence of pneumothorax or pleural effusion.        BONY THORAX AND SOFT TISSUES: No acute osseous abnormality       _______________                   Cardiology Procedures:   Results for orders placed or performed during the hospital encounter of 09/09/20   EKG, 12 LEAD, INITIAL   Result Value Ref Range    Ventricular Rate 35 BPM    Atrial Rate 35 BPM    P-R Interval 174 ms    QRS Duration 94 ms    Q-T Interval 550 ms    QTC Calculation (Bezet) 419 ms    Calculated P Axis 26 degrees    Calculated R Axis 25 degrees    Calculated T Axis 40 degrees Diagnosis       Marked sinus bradycardia  Abnormal ECG  Confirmed by Kaci Rucker MD, [facility] ([id number]) on 9/13/2020 2:15:36 PM        Echo Results  (Last 48 hours)    None       Cardiolite (Tc-99m Sestamibi) stress test    Signed By: Prema Chapin MD     September 15, 2020

## 2020-09-16 LAB
ALBUMIN SERPL-MCNC: 3.5 G/DL (ref 3.4–5)
ALBUMIN/GLOB SERPL: 1.3 {RATIO} (ref 0.8–1.7)
ALP SERPL-CCNC: 76 U/L (ref 45–117)
ALT SERPL-CCNC: 50 U/L (ref 13–56)
ANION GAP SERPL CALC-SCNC: 5 MMOL/L (ref 3–18)
AST SERPL-CCNC: 58 U/L (ref 10–38)
ATRIAL RATE: 47 BPM
BASOPHILS # BLD: 0 K/UL (ref 0–0.1)
BASOPHILS NFR BLD: 0 % (ref 0–2)
BILIRUB SERPL-MCNC: 0.9 MG/DL (ref 0.2–1)
BUN SERPL-MCNC: 31 MG/DL (ref 7–18)
BUN/CREAT SERPL: 31 (ref 12–20)
CA-I SERPL-SCNC: 1.18 MMOL/L (ref 1.12–1.32)
CALCIUM SERPL-MCNC: 9.1 MG/DL (ref 8.5–10.1)
CALCULATED P AXIS, ECG09: 63 DEGREES
CALCULATED R AXIS, ECG10: 7 DEGREES
CALCULATED T AXIS, ECG11: 55 DEGREES
CHLORIDE SERPL-SCNC: 103 MMOL/L (ref 100–111)
CK SERPL-CCNC: 313 U/L (ref 26–192)
CO2 SERPL-SCNC: 33 MMOL/L (ref 21–32)
CREAT SERPL-MCNC: 0.99 MG/DL (ref 0.6–1.3)
DIAGNOSIS, 93000: NORMAL
DIFFERENTIAL METHOD BLD: ABNORMAL
EOSINOPHIL # BLD: 0 K/UL (ref 0–0.4)
EOSINOPHIL NFR BLD: 0 % (ref 0–5)
ERYTHROCYTE [DISTWIDTH] IN BLOOD BY AUTOMATED COUNT: 15.7 % (ref 11.6–14.5)
GLOBULIN SER CALC-MCNC: 2.8 G/DL (ref 2–4)
GLUCOSE BLD STRIP.AUTO-MCNC: 104 MG/DL (ref 70–110)
GLUCOSE BLD STRIP.AUTO-MCNC: 114 MG/DL (ref 70–110)
GLUCOSE BLD STRIP.AUTO-MCNC: 133 MG/DL (ref 70–110)
GLUCOSE BLD STRIP.AUTO-MCNC: 141 MG/DL (ref 70–110)
GLUCOSE BLD STRIP.AUTO-MCNC: 160 MG/DL (ref 70–110)
GLUCOSE SERPL-MCNC: 152 MG/DL (ref 74–99)
HCT VFR BLD AUTO: 30.9 % (ref 35–45)
HGB BLD-MCNC: 10.1 G/DL (ref 12–16)
LYMPHOCYTES # BLD: 0.3 K/UL (ref 0.9–3.6)
LYMPHOCYTES NFR BLD: 25 % (ref 21–52)
MAGNESIUM SERPL-MCNC: 1.9 MG/DL (ref 1.6–2.6)
MCH RBC QN AUTO: 27.1 PG (ref 24–34)
MCHC RBC AUTO-ENTMCNC: 32.7 G/DL (ref 31–37)
MCV RBC AUTO: 82.8 FL (ref 74–97)
MONOCYTES # BLD: 0.1 K/UL (ref 0.05–1.2)
MONOCYTES NFR BLD: 8 % (ref 3–10)
NEUTS SEG # BLD: 0.9 K/UL (ref 1.8–8)
NEUTS SEG NFR BLD: 67 % (ref 40–73)
P-R INTERVAL, ECG05: 160 MS
PHOSPHATE SERPL-MCNC: 2.4 MG/DL (ref 2.5–4.9)
PHOSPHATE SERPL-MCNC: 2.9 MG/DL (ref 2.5–4.9)
PLATELET # BLD AUTO: 46 K/UL (ref 135–420)
PMV BLD AUTO: 9.4 FL (ref 9.2–11.8)
POTASSIUM SERPL-SCNC: 3.8 MMOL/L (ref 3.5–5.5)
POTASSIUM SERPL-SCNC: 4.1 MMOL/L (ref 3.5–5.5)
PROT SERPL-MCNC: 6.3 G/DL (ref 6.4–8.2)
Q-T INTERVAL, ECG07: 480 MS
QRS DURATION, ECG06: 94 MS
QTC CALCULATION (BEZET), ECG08: 424 MS
RBC # BLD AUTO: 3.73 M/UL (ref 4.2–5.3)
SODIUM SERPL-SCNC: 141 MMOL/L (ref 136–145)
VENTRICULAR RATE, ECG03: 47 BPM
WBC # BLD AUTO: 1.3 K/UL (ref 4.6–13.2)

## 2020-09-16 PROCEDURE — 74011250637 HC RX REV CODE- 250/637: Performed by: HOSPITALIST

## 2020-09-16 PROCEDURE — 74011000250 HC RX REV CODE- 250: Performed by: INTERNAL MEDICINE

## 2020-09-16 PROCEDURE — 65610000006 HC RM INTENSIVE CARE

## 2020-09-16 PROCEDURE — 36415 COLL VENOUS BLD VENIPUNCTURE: CPT

## 2020-09-16 PROCEDURE — 92526 ORAL FUNCTION THERAPY: CPT

## 2020-09-16 PROCEDURE — 94640 AIRWAY INHALATION TREATMENT: CPT

## 2020-09-16 PROCEDURE — 82962 GLUCOSE BLOOD TEST: CPT

## 2020-09-16 PROCEDURE — 97166 OT EVAL MOD COMPLEX 45 MIN: CPT

## 2020-09-16 PROCEDURE — 97530 THERAPEUTIC ACTIVITIES: CPT

## 2020-09-16 PROCEDURE — 74011636637 HC RX REV CODE- 636/637: Performed by: FAMILY MEDICINE

## 2020-09-16 PROCEDURE — 74011250636 HC RX REV CODE- 250/636: Performed by: HOSPITALIST

## 2020-09-16 PROCEDURE — 74011250636 HC RX REV CODE- 250/636: Performed by: INTERNAL MEDICINE

## 2020-09-16 PROCEDURE — 74011250637 HC RX REV CODE- 250/637: Performed by: INTERNAL MEDICINE

## 2020-09-16 PROCEDURE — 84100 ASSAY OF PHOSPHORUS: CPT

## 2020-09-16 PROCEDURE — 85025 COMPLETE CBC W/AUTO DIFF WBC: CPT

## 2020-09-16 PROCEDURE — 82330 ASSAY OF CALCIUM: CPT

## 2020-09-16 PROCEDURE — 84132 ASSAY OF SERUM POTASSIUM: CPT

## 2020-09-16 PROCEDURE — 93005 ELECTROCARDIOGRAM TRACING: CPT

## 2020-09-16 PROCEDURE — 74011250637 HC RX REV CODE- 250/637: Performed by: FAMILY MEDICINE

## 2020-09-16 PROCEDURE — 97163 PT EVAL HIGH COMPLEX 45 MIN: CPT

## 2020-09-16 PROCEDURE — 77010033678 HC OXYGEN DAILY

## 2020-09-16 PROCEDURE — 83735 ASSAY OF MAGNESIUM: CPT

## 2020-09-16 PROCEDURE — 82550 ASSAY OF CK (CPK): CPT

## 2020-09-16 RX ORDER — SODIUM,POTASSIUM PHOSPHATES 280-250MG
2 POWDER IN PACKET (EA) ORAL
Status: COMPLETED | OUTPATIENT
Start: 2020-09-16 | End: 2020-09-16

## 2020-09-16 RX ORDER — POTASSIUM CHLORIDE 20 MEQ/1
20 TABLET, EXTENDED RELEASE ORAL
Status: COMPLETED | OUTPATIENT
Start: 2020-09-16 | End: 2020-09-16

## 2020-09-16 RX ORDER — PANTOPRAZOLE SODIUM 40 MG/1
40 TABLET, DELAYED RELEASE ORAL EVERY 24 HOURS
Status: DISCONTINUED | OUTPATIENT
Start: 2020-09-16 | End: 2020-09-18 | Stop reason: HOSPADM

## 2020-09-16 RX ORDER — INSULIN LISPRO 100 [IU]/ML
INJECTION, SOLUTION INTRAVENOUS; SUBCUTANEOUS
Status: DISCONTINUED | OUTPATIENT
Start: 2020-09-16 | End: 2020-09-18 | Stop reason: HOSPADM

## 2020-09-16 RX ADMIN — FUROSEMIDE 20 MG: 10 INJECTION, SOLUTION INTRAMUSCULAR; INTRAVENOUS at 08:57

## 2020-09-16 RX ADMIN — ARFORMOTEROL TARTRATE 15 MCG: 15 SOLUTION RESPIRATORY (INHALATION) at 21:00

## 2020-09-16 RX ADMIN — BUDESONIDE 500 MCG: 0.5 INHALANT RESPIRATORY (INHALATION) at 07:34

## 2020-09-16 RX ADMIN — BUDESONIDE 500 MCG: 0.5 INHALANT RESPIRATORY (INHALATION) at 21:01

## 2020-09-16 RX ADMIN — DOCUSATE SODIUM 100 MG: 100 CAPSULE, LIQUID FILLED ORAL at 21:46

## 2020-09-16 RX ADMIN — DOCUSATE SODIUM 100 MG: 100 CAPSULE, LIQUID FILLED ORAL at 09:00

## 2020-09-16 RX ADMIN — ONDANSETRON 4 MG: 2 INJECTION INTRAMUSCULAR; INTRAVENOUS at 17:23

## 2020-09-16 RX ADMIN — INSULIN LISPRO 2 UNITS: 100 INJECTION, SOLUTION INTRAVENOUS; SUBCUTANEOUS at 21:45

## 2020-09-16 RX ADMIN — METHYLPREDNISOLONE SODIUM SUCCINATE 20 MG: 40 INJECTION, POWDER, FOR SOLUTION INTRAMUSCULAR; INTRAVENOUS at 21:46

## 2020-09-16 RX ADMIN — POTASSIUM CHLORIDE 20 MEQ: 20 TABLET, EXTENDED RELEASE ORAL at 05:35

## 2020-09-16 RX ADMIN — HEPARIN SODIUM 5000 UNITS: 5000 INJECTION INTRAVENOUS; SUBCUTANEOUS at 11:32

## 2020-09-16 RX ADMIN — METHYLPREDNISOLONE SODIUM SUCCINATE 20 MG: 40 INJECTION, POWDER, FOR SOLUTION INTRAMUSCULAR; INTRAVENOUS at 08:57

## 2020-09-16 RX ADMIN — POTASSIUM & SODIUM PHOSPHATES POWDER PACK 280-160-250 MG 2 PACKET: 280-160-250 PACK at 05:35

## 2020-09-16 RX ADMIN — Medication 10 ML: at 14:00

## 2020-09-16 RX ADMIN — HEPARIN SODIUM 5000 UNITS: 5000 INJECTION INTRAVENOUS; SUBCUTANEOUS at 05:25

## 2020-09-16 RX ADMIN — POTASSIUM & SODIUM PHOSPHATES POWDER PACK 280-160-250 MG 2 PACKET: 280-160-250 PACK at 08:57

## 2020-09-16 RX ADMIN — PANTOPRAZOLE SODIUM 40 MG: 40 TABLET, DELAYED RELEASE ORAL at 21:45

## 2020-09-16 RX ADMIN — METHADONE HYDROCHLORIDE 15 MG: 10 TABLET ORAL at 21:46

## 2020-09-16 RX ADMIN — ARFORMOTEROL TARTRATE 15 MCG: 15 SOLUTION RESPIRATORY (INHALATION) at 07:34

## 2020-09-16 RX ADMIN — Medication 10 ML: at 21:46

## 2020-09-16 RX ADMIN — HEPARIN SODIUM 5000 UNITS: 5000 INJECTION INTRAVENOUS; SUBCUTANEOUS at 17:23

## 2020-09-16 NOTE — PROGRESS NOTES
0715- Bedside and Verbal shift change report given to Rustam Hathaway RN and Pablo Harry RN (oncoming nurse) by Tiffany Charles RN (offgoing nurse). Report included the following information SBAR, Kardex, MAR and Recent Results. 56- Rounds complete. 1910- Bedside and Verbal shift change report given to  (oncoming nurse) by Pablo Harry RN and Sylvia Guillaume RN (offgoing nurse). Report included the following information SBAR, Kardex, MAR and Recent Results.

## 2020-09-16 NOTE — PROGRESS NOTES
Bedside and Verbal shift change report given to Patsy Ramirez RN (oncoming nurse) by Roxie Aguilar RN (offgoing nurse). Report included the following information SBAR, Kardex, Intake/Output and Recent Results. 0800 Shift assessment completed. Alert and oriented, follows commands, NAD.    0930 Interdisciplinary rounding completed. Order obtained for PT/OT eval and OOB to chair, Protonix will be changed to po.     1200 Shift reassessment completed, no changes noted. 1600 Shift reassessment completed, no changed noted. 1724 C/O nausea, Zofran given. 1800 Patient appears comfortable. Sleeping at this time. Bedside and Verbal shift change report given to KOJO Medrano RN (oncoming nurse) by Patsy Ramirez RN (offgoing nurse). Report included the following information SBAR, Kardex, Intake/Output and Recent Results.

## 2020-09-16 NOTE — PROGRESS NOTES
Problem: Mobility Impaired (Adult and Pediatric)  Goal: *Acute Goals and Plan of Care (Insert Text)  Description: Physical Therapy Goals   Initiated 9/16/2020 and to be accomplished within 5-7 day(s)  1. Patient will move from supine <> sit with S in prep for out of bed activity and change of position. 2.  Patient will perform sit<> stand with S with LRAD in prep for transfers/ambulation. 3.  Patient will transfer from bed <> chair with S with LRAD for time up in chair for completion of ADL activity. 4.  Patient will ambulate 150 feet with  S/LRAD for improved functional mobility at discharge. Outcome: Progressing Towards Goal  PHYSICAL THERAPY EVALUATION    Patient: Arely Foster (58 y.o. female)  Date: 9/16/2020  Primary Diagnosis: Methadone overdose ([facility] [address] [T40.3X1A]  Hypercarbia [R06.89]  Hypoxia [R09.02]  Precautions: Aspiration, Fall, Skin, Other (comment)(Suicide precautions)    ASSESSMENT :  Based on the objective data described below, the patient is a 73 yo F seen in ICU. Pt  admitted with above diagnosis and presents with LE weakness, tremors UE's, limitations in functional mobility with regard to bed mobility/transfers, gait quality and decreased activity tolerance. Pt found supine in bed; sitter present CNA Most. C/o right rib soreness but then denies pain. Pt reports amb w/o AD PTA. Pt lives with spouse in ground level apartment. Pt performed supine >sit with CGA (eager to sit up). Upon sitting pt reports urgent need to use BSC for BM. Nurse Yaneth Ritchie in to bring [facility] to pt. Pt sit >stand with CGA/min A and able to take several small steps toward R to [facility] with min assistance/vc/tc for safety and sequencing. OT Paige arrived while pt on BSC. Pt able to have BM and hygiene care performed by nurse Yaneth Ritchie and OT. GT with RW 3 ft with HOB with min A and returned to supine with CGA. Pt left sitting up in bed with all needs in reach, and nurse Yaneth Ritchie aware.  Note O2 sat decreased to 80's during GT, but increased to 100% again with seated rest and vc for breathing. Recommend HHPT for follow up physical therapy upon discharge to reach maximal level of independence/safety with functional mobility. Pt Education: Role of physical therapy in acute care setting, fall prevention and safety/technique during functional mobility tasks      Patient will benefit from skilled intervention to address the above impairments. Patients rehabilitation potential is considered to be Good  Factors which may influence rehabilitation potential include:   []         None noted  []         Mental ability/status  [x]         Medical condition  [x]         Home/family situation and support systems  []         Safety awareness  []         Pain tolerance/management  []         Other:      PLAN :  Recommendations and Planned Interventions:  [x]           Bed Mobility Training             []    Neuromuscular Re-Education  [x]           Transfer Training                   []    Orthotic/Prosthetic Training  [x]           Gait Training                          []    Modalities  [x]           Therapeutic Exercises          []    Edema Management/Control  [x]           Therapeutic Activities            [x]    Patient and Family Training/Education  []           Other (comment):    Frequency/Duration: Patient will be followed by physical therapy 1-2 times per day to address goals. Discharge Recommendations: Home Health   Further Equipment Recommendations for Discharge: to be determined     SUBJECTIVE:   Patient stated I feel good; ribs sore.     OBJECTIVE DATA SUMMARY:     Past Medical History:   Diagnosis Date    Chronic back pain     Diabetes ([facility] [address]     Drug abuse ([facility] [address]     Hepatic cirrhosis (HCC)     Hepatitis C     Heroin abuse ([facility] [address]     Pain management     Restless leg syndrome     Sciatica     Spleen enlarged     Thyroid disease      Past Surgical History:   Procedure Laterality Date    HX CHOLECYSTECTOMY      HX GYN hysterectomy    HX HEENT      T&A    HX ORTHOPAEDIC      Bunion, left hand and right arm abscess removal     Barriers to Learning/Limitations: yes;  physical  Compensate with: Visual Cues, Verbal Cues, and Tactile Cues  Prior Level of Function/Home Situation: as above noted  Home Situation  Home Environment: Apartment  # Steps to Enter: 0  One/Two Story Residence: One story  Living Alone: No  Support Systems: Spouse/Significant Other/Partner  Patient Expects to be Discharged to[de-identified] Apartment  Current DME Used/Available at Home: Walker, rolling, Commode, bedside, Other (comment)(power scooter)  Tub or Shower Type: Tub/Shower combination(pt reports she has shower chair, but \"it doesn't fit\")  Critical Behavior:  Neurologic State: Alert  Orientation Level: Oriented X4  Cognition: Appropriate decision making; Appropriate for age attention/concentration; Follows commands  Safety/Judgement: Awareness of environment; Fall prevention  Psychosocial  Patient Behaviors: Calm; Cooperative  Needs Expressed: Educational;Emotional  Purposeful Interaction: Yes  Pt Identified Daily Priority: Clinical issues (comment)  Caritas Process: Nurture loving kindness;Enable helen/hope;Establish trust;Teaching/learning; Attend basic human needs  Caring Interventions: Therapeutic modalities  Reassure: Therapeutic listening;Caring rounds  Therapeutic Modalities: Intentional therapeutic touch  Skin Integrity: Intact; Tattoos (comment); Other (comment)(bruising)  Skin Integumentary  Skin Color: Other (comment)(vitiligo)  Skin Integrity: Intact; Tattoos (comment); Other (comment)(bruising)  Strength:    Strength: Generally decreased, functional(LE's )  Range Of Motion:  AROM: Within functional limits  PROM: Within functional limits  Functional Mobility:  Bed Mobility:  Supine to Sit: Contact guard assistance  Sit to Supine: Contact guard assistance  Scooting: Contact guard assistance; Additional time  Transfers:  Sit to Stand: Contact guard assistance;Minimum assistance  Stand to Sit: Contact guard assistance  Balance:   Sitting: Impaired; With support  Sitting - Static: Fair (occasional)  Sitting - Dynamic: Fair (occasional)  Standing: Impaired; With support  Standing - Static: Fair  Standing - Dynamic : Fair;Occasional(/fair -)  Ambulation/Gait Training:  Distance (ft): 4 Feet (ft)  Assistive Device: Gait belt;Walker, rolling  Ambulation - Level of Assistance: Minimal assistance  Gait Description (WDL): Exceptions to WDL  Gait Abnormalities: Decreased step clearance; Step to gait  Base of Support: Narrowed  Speed/Tamia: Slow;Shuffled  Step Length: Right shortened;Left shortened  Interventions: Tactile cues; Safety awareness training;Verbal cues; Visual/Demos  Pain:  Pain Scale 1: Numeric (0 - 10)  Pain Intensity 1: 0  Activity Tolerance:   Fair   Please refer to the flowsheet for vital signs taken during this treatment. After treatment:   []         Patient left in no apparent distress sitting up in chair  [x]         Patient left in no apparent distress in bed  [x]         Call bell left within reach  [x]         Nursing notified  [x]         Sitter present  []         Bed alarm activated    COMMUNICATION/EDUCATION:   [x]         Fall prevention education was provided and the patient/caregiver indicated understanding. [x]         Patient/family have participated as able in goal setting and plan of care. [x]         Patient/family agree to work toward stated goals and plan of care. []         Patient understands intent and goals of therapy, but is neutral about his/her participation. []         Patient is unable to participate in goal setting and plan of care.     Eval Complexity: History: HIGH Complexity :3+ comorbidities / personal factors will impact the outcome/ POC Exam:MEDIUM Complexity : 3 Standardized tests and measures addressing body structure, function, activity limitation and / or participation in recreation  Presentation: MEDIUM Complexity : Evolving with changing characteristics  Clinical Decision Making:Medium Complexity    Overall Complexity:MEDIUM    Thank you for this referral.  Bel Kaiser, PT   Time Calculation: 37 mins

## 2020-09-16 NOTE — DIABETES MGMT
GLYCEMIC CONTROL PROGRESS NOTE:    - discussed in rounds, known h/o DM2, HbA1C within recommended range for age + comorbids  - diet changed to mech soft  - Solumedrol 20 mg Q 8 hours  - TDD = 2 Humalog Normal Insulin Sensitivity Corrective Coverage, recommend continue    Recent Glucose Results:   Lab Results   Component Value Date/Time     (H) 09/16/2020 03:00 AM    GLUCPOC 114 (H) 09/16/2020 09:21 AM    GLUCPOC 141 (H) 09/16/2020 05:11 AM    GLUCPOC 109 09/15/2020 11:18 PM     Catie Murphy MS, RN, CDE  Glycemic Control Team  [phone]  Pager [phone] (M-TH 8:00-4:30P)  *After Hours pager [phone]

## 2020-09-16 NOTE — PROGRESS NOTES
1910 - Bedside and Verbal shift change report given to Jessica Esquivel RN (oncoming nurse) by Galen Taylor RN (offgoing nurse). Report included the following information SBAR, Kardex, ED Summary, Procedure Summary, Intake/Output, MAR, Recent Results, Med Rec Status and Cardiac Rhythm Sinus Jonna Ann. Sitter at bedside. 2000 - Shift assessment completed. Patient alert and oriented. Patient complaining of abdominal pain. Patient assisted to bedpan for BM; unsuccessful. 2300 - PRN Doculax administered for constipation. 0000 - Reassessment completed, patient assisted to [facility] to have BM; successful.     0400 - Reassessment completed, no changes to previous assessment. 0720 - Bedside and Verbal shift change report given to Rula Trujillo RN and Katia Manzano RN (oncoming nurse) by Jessica Esquivel RN (offgoing nurse). Report included the following information SBAR, Kardex, ED Summary, Procedure Summary, Intake/Output, MAR, Recent Results, Med Rec Status and Cardiac Rhythm Sinus Jonna Ann.

## 2020-09-16 NOTE — PROGRESS NOTES
Comprehensive Nutrition Assessment    Type and Reason for Visit: Reassess    Nutrition Recommendations/Plan: Continue with POC    Nutrition Assessment:  (P) pt admitted w/ Acute respiratory failure with Hypercarbia and hypoxia, methadone overdose-psych on board, bradycardia, acute encephalopahty, LIONEL-resolved, anemia, thrombocytopenia,      Estimated Daily Nutrient Needs:  Energy (kcal):  1597  Protein (g):  48-64       Fluid (ml/day):  1597    Nutrition Related Findings:  (P) Meds: zofran, promethazine, bisacodyl, acetaminophen, naloxone, sodium chloride, glucagon, lasix, methadone, methylpredini solone; +BM 9/16; intake average of 50%; Labs: low phosphorus of 2.4      Wounds:    (P) None       Current Nutrition Therapies:   DIET DYSPHAGIA MECH ALTERED (NDD2)    Anthropometric Measures:  · Height:  5' 2.01\" (157.5 cm)  · Current Body Wt:  80.7 kg (178 lb)   · Admission Body Wt:  186 lb    · Usual Body Wt:  178 lb(8/23/20)     · Ideal Body Wt:  110 lbs:  161.8 %   · Adjusted Body Weight:   ; Weight Adjustment for: No adjustment   · Adjusted BMI:       · BMI Category:  Obese class 1 (BMI 30.0-34. 9)       Nutrition Diagnosis:   · Inadequate oral intake related to (P) swallowing difficulty as evidenced by (P) intake 26-50%    Nutrition Interventions:   Food and/or Nutrient Delivery: (P) Continue current diet, Start oral nutrition supplement  Nutrition Education and Counseling: (P) No recommendations at this time  Coordination of Nutrition Care: (P) Continued inpatient monitoring, Interdisciplinary rounds    Goals:  (P) Continue diet intake of at least 70% in the next 3-5 days       Nutrition Monitoring and Evaluation:   Behavioral-Environmental Outcomes:    Food/Nutrient Intake Outcomes: (P) Diet advancement/tolerance, Food and nutrient intake, Supplement intake  Physical Signs/Symptoms Outcomes: (P) Chewing or swallowing, Skin, Weight, Biochemical data, GI status    Discharge Planning:    (P) Too soon to determine Electronically signed by Lavella Seip on 9/16/2020 at 1:30 PM

## 2020-09-16 NOTE — BH NOTES
[address] [facility]  Name:  Sabrina Hudson  MR#:   [id number]  :  1950  ACCOUNT #:  [de-identified]  DATE OF SERVICE:  2020      DATE OF INITIAL CONSULT:  2020  DATE OF FOLLOWUP:  2020    REASON FOR CONSULTATION:  Methadone overdose. INTERIM HISTORY:  The patient is a 41-year-old female with a history of PTSD, bipolar disorder, depression, opioid use disorder, who was admitted after intentional overdose on methadone. Chart reviewed and noted changes since last seen. .  Today, she is much more awake and alert, smiling, appropriate affect and is oriented to the place and person. She denies over dose as suicide attempt, but said she took extra dose of Methadone on the same day she received her scheduled Methadone at the [facility] clinic. She now says regrets for what she did and has no intention of killing her self. She also says she does not want to be on Methadone and agrees with plan of wearing off. She was able to recall pre admission events, her treatments well. She said she can see Dr. Anna Marie Fernandez, her psychiatrist at [place] for follow up andbut continues to feel very depressed still. She reports sleep appetite are returning to normal.  She denies having any hallucinations or delusions. She denies having any thoughts of suicidal or  homicidal ideations. Reviewed the patient's chart and ongoing treatment. VITAL SIGNS:  Blood pressure (!) 130/58, pulse 62, temperature 97.7 °F (36.5 °C), resp. rate 18, height 5' 2.01\" (1.575 m), weight 80.8 kg (178 lb 2.1 oz), SpO2 100 %.     Allergies   Allergen Reactions    Erythromycin Hives    Penicillins Hives    Tetracycline Hives       Current Facility-Administered Medications:     insulin lispro (HUMALOG) injection, , SubCUTAneous, AC&HS, Anuel Officer, MD, Stopped at 20 1130    pantoprazole (PROTONIX) tablet 40 mg, 40 mg, Oral, Q24H, Jim Rao MD    methylPREDNISolone (PF) (SOLU-MEDROL) injection 20 Problem: Falls - Risk of  Goal: *Absence of Falls  Document Missael Fall Risk and appropriate interventions in the flowsheet.    Outcome: Progressing Towards Goal  Fall Risk Interventions:       Mentation Interventions: Adequate sleep, hydration, pain control, Family/sitter at bedside, More frequent rounding, Reorient patient, Update white board    Medication Interventions: Evaluate medications/consider consulting pharmacy    Elimination Interventions: Call light in reach, Toileting schedule/hourly rounds mg, 20 mg, IntraVENous, Q12H, Johnny ALVARENGA MD    [START ON 9/17/2020] predniSONE (DELTASONE) tablet 30 mg, 30 mg, Oral, DAILY, Branden Conteh MD    methadone (DOLOPHINE) tablet 15 mg, 15 mg, Oral, QHS, Teodora Guillaume MD, 15 mg at 09/15/20 2251    albuterol-ipratropium (DUO-NEB) 2.5 MG-0.5 MG/3 ML, 3 mL, Nebulization, Q6H PRN, Zarina Moore MD    docusate sodium (COLACE) capsule 100 mg, 100 mg, Oral, BID, Maura Lesch B, MD, 100 mg at 09/16/20 0900    polyethylene glycol (MIRALAX) packet 17 g, 17 g, Oral, DAILY PRN, Stewart Stewart MD    morphine injection 2 mg, 2 mg, IntraVENous, Q4H PRN, Maura Lesch B, MD, 2 mg at 09/15/20 1757    cloNIDine HCL (CATAPRES) tablet 0.1 mg, 0.1 mg, Oral, Q4H PRN, Maura Lesch B, MD, 0.1 mg at 09/14/20 2118    heparin (porcine) injection 5,000 Units, 5,000 Units, SubCUTAneous, Q8H, Rachele Pinzon MD, 5,000 Units at 09/16/20 1132    furosemide (LASIX) injection 20 mg, 20 mg, IntraVENous, DAILY, Rachele Pinzon MD, 20 mg at 09/16/20 0857    ELECTROLYTE REPLACEMENT PROTOCOL - Phosphorus  Standard Dosing, 1 Each, Other, PRN, Rachele Pinzon MD    nicotine (NICODERM CQ) 14 mg/24 hr patch 1 Patch, 1 Patch, TransDERmal, DAILY, Rachele Pinzon MD, 1 Patch at 09/16/20 0900    budesonide (PULMICORT) 500 mcg/2 ml nebulizer suspension, 500 mcg, Nebulization, BID RT, Rachele Pinzon MD, 500 mcg at 09/16/20 0734    promethazine (PHENERGAN) 12.5 mg in 0.9% sodium chloride 50 mL IVPB, 12.5 mg, IntraVENous, Q6H PRN, Rachele Pinzon MD, Last Rate: 200 mL/hr at 09/1950, 12.5 mg at 09/1950    arformoteroL (BROVANA) neb solution 15 mcg, 15 mcg, Nebulization, BID RT, Rachele Pinzon MD, 15 mcg at 09/16/20 0734    sodium chloride (NS) flush 5-40 mL, 5-40 mL, IntraVENous, Q8H, Teodora Guillaume MD, 10 mL at 09/15/20 1319    sodium chloride (NS) flush 5-40 mL, 5-40 mL, IntraVENous, PRN, Teodora Guillaume MD, 10 mL at 09/09/20 2022   acetaminophen (TYLENOL) tablet 650 mg, 650 mg, Oral, Q6H PRN **OR** acetaminophen (TYLENOL) suppository 650 mg, 650 mg, Rectal, Q6H PRN, Edwar Edmondson MD    bisacodyL (DULCOLAX) suppository 10 mg, 10 mg, Rectal, DAILY PRN, Edwar Edmondson MD, 10 mg at 09/15/20 2311    promethazine (PHENERGAN) tablet 12.5 mg, 12.5 mg, Oral, Q6H PRN **OR** ondansetron (ZOFRAN) injection 4 mg, 4 mg, IntraVENous, Q6H PRN, Edwar Edmondson MD, 4 mg at 09/12/20 0313    naloxone (NARCAN) injection 0.4 mg, 0.4 mg, IntraVENous, EVERY 2 MINUTES AS NEEDED, Edwar Edmondson MD, 0.4 mg at 09/10/20 1555    glucose chewable tablet 16 g, 4 Tab, Oral, PRN, Edwar Edmondson MD    glucagon [facility]) injection 1 mg, 1 mg, IntraMUSCular, PRN, Edwar Edmondson MD    ELECTROLYTE REPLACEMENT PROTOCOL - Potassium Standard Dosing , 1 Each, Other, PRN, Edwar Edmondson MD    ELECTROLYTE REPLACEMENT PROTOCOL - Magnesium , 1 Each, Blu, PRNEdwar MD    ELECTROLYTE REPLACEMENT PROTOCOL - Calcium , 1 Each, YASMEEN Hurt Larene Lore, MD    MENTAL STATUS EXAMINATION:  Appearance: The patient is an obese, elderly [facility] American female who appears much older than stated age. She is lying in the bed AAOX4 pleasant   She is alert and oriented x4. Speech:  Her speech is spontaneous, normal volume and tone. Mood:  Her mood is expressed in \"feeling better. \"  Affect:  Her affect is tearful and mood congruent. Thought Process:  Appears to be linear. Thought Content:  Passive suicidal ideations voiced. No homicidal ideations. No paranoia. Perceptual Disturbances:  Denies having any hallucinations. Insight is fair. Impulse control is fairr. Memory is poor to fair. ASSESSMENT:  The patient was seen for follow up, showing good improvement in he clinical presentation . She reported intentional overdose on the methadone  One extra dose from the day before supply, did not appear to be a suicide attempt which she also denies. She is  denying any SI or intent to kill herself by OD on Methadone.    Regarding opioid use and methadone management, given the patient's current condition with bradycardia and high risk of QT prolongation, methadone may not be an appropriate choice and needs to be closely monitored, agrees with gradual taper off plan  Recommend continued monitoring for withdrawals using COWS scale and treat her symptomatically. Also as specified in previous note, avoid QT prolonging agents like Lexapro, Vistaril, trazodone that she was taking previously as an outpatient. Can restart Latuda 20mg daily for Bipolar depression     NO Need for Psych admission can be discharged when medically stable and cleared. She is recommended follow up with her Psychiatrist at [facility], [place] on-call is available for any additional questions,  call if needed, will sign off. Thanks for the consult.       Michael Prater MD

## 2020-09-16 NOTE — PROGRESS NOTES
Pulmonary, Critical Care, and Sleep Medicine     Pulmonary Progress Note    Name: Mary River   : 1950   MRN: [id number]   Date: 2020    [x]I have reviewed the flowsheet and previous days notes. Events, vitals, medications and notes from last 24 hours reviewed. Care plan discussed on multidisciplinary rounds. IMPRESSION:   Patient Active Problem List   Diagnosis Code    Acute on chronic respiratory failure with hypoxia and hypercapnia (HCC) J96.21, J96.22    Encephalopathy acute G93.40    Methadone overdose ([facility] [address] M26.4H8N    Diastolic congestive heart failure (HCC) I50.30    Prolonged Q-T interval on ECG R94.31    Bradycardia R00.1    Abnormal echocardiogram R93.1    LIONEL (acute kidney injury) ([facility] [address] N17.9    Rhabdomyolysis M62.82    Thrombocytopenia (HCC) D69.6    Pancytopenia, chronic (HCC) D61.818    Cirrhosis of liver K74.60    Pronounced biliary ductal dilatation     Hepatosplenomegaly R16.2    Nonrheumatic mitral valve regurgitation I34.0    RLS G25.81    Former smoker Z87.891    PTSD F43.1    Bipolar disorder F31    Multiple substance use disorder - cannabis, cocaine, heroin, ETOH F19      PLAN:   Respi: BiPAP PRN. Supplemental O2 at 2 Lpm via NC, titrate flow for goal SPO2> 91%  Taper steroid today  Aspiration prevention bundle, head of the bed at 30' all times  PFT and PSG as out patient with regular pulmonologist at [place] recommended  Bronchodilators, pulmonary hygiene care  ID: No clinical evidence of sepsis  Antibiotic choice: stopped Clindamycin 9/15- CXR f/up stable, nil acute  Blood Cultures NGTD.   Non reactive HIV and positive Hep C Ab  Cardio: Monitor HR; cardiology suspects bradycardia medication induced; hemodynamics stable  Withdrawal symptoms required starting on lo dose Methadone - tolerating so far; HR in 50's  Will get PT/OT and OOB to monitor chronotropic response to exercise  EKG this AM normal QTc  Diuresis with low dose Lasix at 20 mg as tolerated  Heme: Smear reviewed by hematology. No plt clumping, no schistocytes, no hypochromia, no bandemia or other signs of infection per hematology. Given alcohol abuse, cirrhosis and chronic thrombocytopenia in the setting of normal renal function, unlikely TTP per hematology. Normal fibrinogen on DIC panel, mild INR felt to be 2nd to cirrhosis. Monitor CBC daily  Renal: Monitor renal functions and UO  CK improving  Replace lytes per unit protocol as needed  GI: diet as tolerated per swallow evaluation  Normal LFT and hepatosplenomegaly related hematological findings  No clinical evidence of acute cholecystitis or acute abdomen  Endo: Glycemic control. Normal TSH  Neuro: MRI could not be completed as patient became restless on table. Patient has improved mentation and will DC MRI as may not provide additional information besides patient unable to tolerate MRI  Patient appears improved gradually, admitted overdosing methadone; remained off of Precedex hence unclear if MRI may add more information  May get repeat CT head per clinical course  Normal B12, Folate, iron, Ammonia  Psych: Appreciate psych input regarding methadone and other meds  Sitter for any suicidal risk continue per psych. Patient declined any suicidal ideation but has chronic major depression. Reported at bedside that she took additional dose of Methadone since she missed dose previous day    Hopefully she could transition to floor soon. Will defer respective systems problem management to primary and other consultant and will sign off once out of ICU  Quality Care: PPI, DVT prophylaxis, HOB elevated, Infection control all reviewed and addressed. PAIN AND SEDATION: had fall few weeks back at home reportedly. Judicious opiate use  Skin/Wound: bruise on LT thigh from recent fall  Nutrition: diet  Prophylaxis: DVT [Heparin] and GI [PPI] Prophylaxis reviewed.    PT/OT eval and treat: as needed when stable   Lines/Tubes: lines PIV  ADVANCE DIRECTIVE: full code. DISCUSSION: spoke with patient, RN, RT  Events and notes from last 24 hours reviewed. Care plan discussed with nursing                  HPI:   72 y.o. female with depression, heroin abuse on methadone came to ER with EMS due to mental status changes, became confused and suspected taking additional methadone. She was given 1.5 mg Narcan per EMS, her mental status and confusion got improving. She received another 2 mg Narcan in our ER, she become more awake and alert. In ER, noted with hypercapnia, bradycardia, EKG with prolonged QT. CT head no acute proces      09/16/20  Subjective:   Patient remained AAO x 3, calm in bed sitting up, following all commands  Declined BiPAP last night, on O2 via NC  ANGEL chronic and stable per patient; denies cough, chest pain, wheezing or hemoptysis  Tolerated Methadone low dose with some bradycardia but HR remained 40-50's  Hemodynamically stable. No palpitations, syncope, orthopnea, edema or pnd  Denies abdominal or flank pain, anorexia, nausea or vomiting, dysphagia, change in bowel habits or black or bloody stools  Afebrile. Tolerating some PO diet- reports poor appetite  Reported missed Methadone day prior and took additional dose on day of event.  Not on any new opiate pain meds      ROS:   General ROS: negative for  - fever, chills, weight loss, fatigue and malaise  Cardiovascular ROS: negative for - chest pain, edema, murmur, orthopnea, palpitations or pnd  Gastrointestinal ROS: no nausea, vomiting, abdominal pain, change in bowel habits, or black or bloody stools  Dermatological ROS: negative for - pruritus, rash or skin lesion changes       Past Medical History:  Past Medical History:   Diagnosis Date    Chronic back pain     Diabetes ([facility] [address]     Drug abuse ([facility] [address]     Hepatic cirrhosis ([facility] [address]     Hepatitis C     Heroin abuse ([facility] [address]     Pain management     Restless leg syndrome     Sciatica     Spleen enlarged     Thyroid disease         Allergy:  Allergies Allergen Reactions    Erythromycin Hives    Penicillins Hives    Tetracycline Hives        Vital Signs:    Blood pressure (!) 164/110, pulse (!) 53, temperature 97.7 °F (36.5 °C), resp. rate 14, height 5' 2.01\" (1.575 m), weight 80.8 kg (178 lb 2.1 oz), SpO2 96 %. Body mass index is 32.57 kg/m². O2 Device: Nasal cannula   O2 Flow Rate (L/min): 2 l/min   Temp (24hrs), Av.1 °F (36.7 °C), Min:97.7 °F (36.5 °C), Max:98.6 °F (37 °C)         Intake/Output:   Last shift:       07 -  1900  In: 485.8 [P.O.:360; I.V.:125.8]  Out: 1200 [Urine:1200]  Last 3 shifts: 1901 -  0700  In: 1074.2 [P.O.:500; I.V.:574.2]  Out: -     Intake/Output Summary (Last 24 hours) at 2020 1258  Last data filed at 2020 1203  Gross per 24 hour   Intake 485.83 ml   Output 1200 ml   Net -714.17 ml       Physical Exam:  General: AAO x 3, in no distress, calm, appears older than stated age, on O2 via NC  HEENT: PERRLA, throat normal without erythema or exudate, has denture  Neck: No abnormally enlarged lymph nodes or thyroid, supple  Chest: normal  Lungs: moderate air entry, clear to auscultation bilaterally, normal percussion bilaterally, no tenderness/ rash  Heart: Regular rate and rhythm, S1S2 present or without murmur or extra heart sounds  Abdomen: protuberant, bowel sounds normoactive, tympanic, soft without significant tenderness, masses, organomegaly or guarding, rigidity, rebound  Extremity: negative edema, cyanosis, clubbing; old bruise on LT thigh  Capillary refill: normal  Neuro: AAO x 3, hand shaking when trying to reach anything in bed, moves all extremities well, exam limitations  Skin: Skin color, texture, turgor fair.  Skin dry, warm, non-diaphoretic    DATA:   Current Facility-Administered Medications   Medication Dose Route Frequency    insulin lispro (HUMALOG) injection   SubCUTAneous AC&HS    pantoprazole (PROTONIX) tablet 40 mg  40 mg Oral Q24H    methadone (DOLOPHINE) tablet 15 mg  15 mg Oral QHS    albuterol-ipratropium (DUO-NEB) 2.5 MG-0.5 MG/3 ML  3 mL Nebulization Q6H PRN    methylPREDNISolone (PF) (SOLU-MEDROL) injection 20 mg  20 mg IntraVENous Q12H    docusate sodium (COLACE) capsule 100 mg  100 mg Oral BID    polyethylene glycol (MIRALAX) packet 17 g  17 g Oral DAILY PRN    morphine injection 2 mg  2 mg IntraVENous Q4H PRN    cloNIDine HCL (CATAPRES) tablet 0.1 mg  0.1 mg Oral Q4H PRN    LORazepam (ATIVAN) injection 1 mg  1 mg IntraVENous Q2H PRN    heparin (porcine) injection 5,000 Units  5,000 Units SubCUTAneous Q8H    furosemide (LASIX) injection 20 mg  20 mg IntraVENous DAILY    ELECTROLYTE REPLACEMENT PROTOCOL - Phosphorus  Standard Dosing  1 Each Other PRN    nicotine (NICODERM CQ) 14 mg/24 hr patch 1 Patch  1 Patch TransDERmal DAILY    budesonide (PULMICORT) 500 mcg/2 ml nebulizer suspension  500 mcg Nebulization BID RT    promethazine (PHENERGAN) 12.5 mg in 0.9% sodium chloride 50 mL IVPB  12.5 mg IntraVENous Q6H PRN    arformoteroL (BROVANA) neb solution 15 mcg  15 mcg Nebulization BID RT    sodium chloride (NS) flush 5-40 mL  5-40 mL IntraVENous Q8H    sodium chloride (NS) flush 5-40 mL  5-40 mL IntraVENous PRN    acetaminophen (TYLENOL) tablet 650 mg  650 mg Oral Q6H PRN    Or    acetaminophen (TYLENOL) suppository 650 mg  650 mg Rectal Q6H PRN    bisacodyL (DULCOLAX) suppository 10 mg  10 mg Rectal DAILY PRN    promethazine (PHENERGAN) tablet 12.5 mg  12.5 mg Oral Q6H PRN    Or    ondansetron (ZOFRAN) injection 4 mg  4 mg IntraVENous Q6H PRN    naloxone (NARCAN) injection 0.4 mg  0.4 mg IntraVENous EVERY 2 MINUTES AS NEEDED    glucose chewable tablet 16 g  4 Tab Oral PRN    glucagon (GLUCAGEN) injection 1 mg  1 mg IntraMUSCular PRN    ELECTROLYTE REPLACEMENT PROTOCOL - Potassium Standard Dosing   1 Each Other PRN    ELECTROLYTE REPLACEMENT PROTOCOL - Magnesium   1 Each Other PRN    ELECTROLYTE REPLACEMENT PROTOCOL - Calcium   1 Each Other PRN Telemetry: [x]Sinus []A-flutter []Paced    []A-fib []Multiple PVCs                    Labs:  Recent Results (from the past 24 hour(s))   GLUCOSE, POC    Collection Time: 09/15/20  6:00 PM   Result Value Ref Range    Glucose (POC) 146 (H) 70 - 110 mg/dL   GLUCOSE, POC    Collection Time: 09/15/20 11:18 PM   Result Value Ref Range    Glucose (POC) 109 70 - 110 mg/dL   MAGNESIUM    Collection Time: 09/16/20  3:00 AM   Result Value Ref Range    Magnesium 1.9 1.6 - 2.6 mg/dL   CALCIUM, IONIZED    Collection Time: 09/16/20  3:00 AM   Result Value Ref Range    Ionized Calcium 1.18 1.12 - 1.32 MMOL/L   PHOSPHORUS    Collection Time: 09/16/20  3:00 AM   Result Value Ref Range    Phosphorus 2.4 (L) 2.5 - 4.9 MG/DL   CK    Collection Time: 09/16/20  3:00 AM   Result Value Ref Range     (H) 26 - 192 U/L   CBC WITH AUTOMATED DIFF    Collection Time: 09/16/20  3:00 AM   Result Value Ref Range    WBC 1.3 (L) 4.6 - 13.2 K/uL    RBC 3.73 (L) 4.20 - 5.30 M/uL    HGB 10.1 (L) 12.0 - 16.0 g/dL    HCT 30.9 (L) 35.0 - 45.0 %    MCV 82.8 74.0 - 97.0 FL    MCH 27.1 24.0 - 34.0 PG    MCHC 32.7 31.0 - 37.0 g/dL    RDW 15.7 (H) 11.6 - 14.5 %    PLATELET 46 (L) 935 - 420 K/uL    MPV 9.4 9.2 - 11.8 FL    NEUTROPHILS 67 40 - 73 %    LYMPHOCYTES 25 21 - 52 %    MONOCYTES 8 3 - 10 %    EOSINOPHILS 0 0 - 5 %    BASOPHILS 0 0 - 2 %    ABS. NEUTROPHILS 0.9 (L) 1.8 - 8.0 K/UL    ABS. LYMPHOCYTES 0.3 (L) 0.9 - 3.6 K/UL    ABS. MONOCYTES 0.1 0.05 - 1.2 K/UL    ABS. EOSINOPHILS 0.0 0.0 - 0.4 K/UL    ABS.  BASOPHILS 0.0 0.0 - 0.1 K/UL    DF AUTOMATED     METABOLIC PANEL, COMPREHENSIVE    Collection Time: 09/16/20  3:00 AM   Result Value Ref Range    Sodium 141 136 - 145 mmol/L    Potassium 3.8 3.5 - 5.5 mmol/L    Chloride 103 100 - 111 mmol/L    CO2 33 (H) 21 - 32 mmol/L    Anion gap 5 3.0 - 18 mmol/L    Glucose 152 (H) 74 - 99 mg/dL    BUN 31 (H) 7.0 - 18 MG/DL    Creatinine 0.99 0.6 - 1.3 MG/DL    BUN/Creatinine ratio 31 (H) 12 - 20      GFR est AA >60 >60 ml/min/1.73m2    GFR est non-AA 56 (L) >60 ml/min/1.73m2    Calcium 9.1 8.5 - 10.1 MG/DL    Bilirubin, total 0.9 0.2 - 1.0 MG/DL    ALT (SGPT) 50 13 - 56 U/L    AST (SGOT) 58 (H) 10 - 38 U/L    Alk. phosphatase 76 45 - 117 U/L    Protein, total 6.3 (L) 6.4 - 8.2 g/dL    Albumin 3.5 3.4 - 5.0 g/dL    Globulin 2.8 2.0 - 4.0 g/dL    A-G Ratio 1.3 0.8 - 1.7     GLUCOSE, POC    Collection Time: 09/16/20  5:11 AM   Result Value Ref Range    Glucose (POC) 141 (H) 70 - 110 mg/dL   EKG, 12 LEAD, SUBSEQUENT    Collection Time: 09/16/20  5:27 AM   Result Value Ref Range    Ventricular Rate 47 BPM    Atrial Rate 47 BPM    P-R Interval 160 ms    QRS Duration 94 ms    Q-T Interval 480 ms    QTC Calculation (Bezet) 424 ms    Calculated P Axis 63 degrees    Calculated R Axis 7 degrees    Calculated T Axis 55 degrees    Diagnosis       Marked sinus bradycardia  Abnormal ECG  Confirmed by Edson De Santiago MD, [employer] ([id number]) on 9/16/2020 7:16:52 AM     GLUCOSE, POC    Collection Time: 09/16/20  9:21 AM   Result Value Ref Range    Glucose (POC) 114 (H) 70 - 110 mg/dL   POTASSIUM    Collection Time: 09/16/20  9:58 AM   Result Value Ref Range    Potassium 4.1 3.5 - 5.5 mmol/L   GLUCOSE, POC    Collection Time: 09/16/20 11:32 AM   Result Value Ref Range    Glucose (POC) 133 (H) 70 - 110 mg/dL           No results for input(s): FIO2I, IFO2, HCO3I, IHCO3, HCOPOC, PCO2I, PCOPOC, IPHI, PHI, PHPOC, PO2I, PO2POC in the last 72 hours.     No lab exists for component: IPOC2    All Micro Results     Procedure Component Value Units Date/Time    CULTURE, BLOOD [[id number]] Collected:  09/11/20 0984    Order Status:  Completed Specimen:  Blood Updated:  09/16/20 3054     Special Requests: NO SPECIAL REQUESTS        Culture result: NO GROWTH 5 DAYS       CULTURE, BLOOD [[id number]] Collected:  09/11/20 1010    Order Status:  Completed Specimen:  Blood Updated:  09/16/20 0758     Special Requests: NO SPECIAL REQUESTS        Culture result: NO GROWTH 5 DAYS             9/10/20 ECHO   · LV: Estimated LVEF is 60 - 65%. Normal cavity size, systolic function (ejection fraction normal) and diastolic function. Mild concentric hypertrophy. E/E' ratio is 7.55.  · LA: Moderately dilated left atrium. · RV: Mildly dilated right ventricle. · RA: Mildly dilated right atrium. · MV: Mitral valve non-specific thickening. Mild mitral annular calcification. Moderate mitral valve regurgitation is present. · TV: Non-specific thickening of the tricuspid valve. Moderate tricuspid valve regurgitation is present. · PA: Mild pulmonary hypertension. Pulmonary arterial systolic pressure is 44 mmHg. There is a small thickening /degenerative changes on tip of anterior leaflet of the tricuspid valve on ventricular side, without independent movement. I cannot exclude possible small vegetation. No previous echocardiogram done at [facility] on 10/11/2019 images available to compare. Imaging:  [x]I have personally reviewed the patients chest radiographs images and report   9/15/20  Results from Hospital Encounter encounter on 09/09/20   XR CHEST PORT    Narrative EXAM: XR CHEST PORT    CLINICAL INDICATION/HISTORY: Pulmonary infiltrates  -Additional: None    COMPARISON: Several prior studies, most recently 9/12/2020    TECHNIQUE: Frontal view of the chest    _______________    FINDINGS:    HEART AND MEDIASTINUM: Stable appearing cardiac size and mediastinal contours. LUNGS AND PLEURAL SPACES: Lungs are mildly underexpanded from prior days exam.  No focal pneumonic opacity. No evidence of pneumothorax or pleural effusion. BONY THORAX AND SOFT TISSUES: No acute osseous abnormality    _______________      Impression IMPRESSION:    Mild interval pulmonary hypoinflation without superimposed acute radiographic  abnormality.       9/12/20  Results from Hospital Encounter encounter on 09/09/20   CT HEAD WO CONT    Narrative _______________    EXAM: CT HEAD WO CONT.  _______________    CLINICAL INDICATION/HISTORY: Bradycardia and neuro fluctuations.  -Additional: None. COMPARISON: CT of 09/09/2020.  _______________    TECHNIQUE/COMPARISON: Nonenhanced CT examination of the head was performed. Sagittal and coronal series were reformatted from the axial source images. One or more dose reduction techniques were used on this CT: automated exposure  control, adjustment of the mAs and/or kVp according to patient size, and  iterative reconstruction techniques. The specific techniques used on this CT  exam have been documented in the patient's electronic medical record. Digital  Imaging and Communications in Medicine (DICOM) format image data are available  to nonaffiliated external healthcare facilities or entities on a secure, media  free, reciprocally searchable basis with patient authorization for at least a  12-month period after this study. _______________    FINDINGS:    BRAIN AND POSTERIOR FOSSA: There is no evidence of acute vascular territorial  ischemia, intracranial hemorrhage, midline shift or mass effect. Periventricular  and subcortical white matter hypoattenuation is most compatible with chronic  microangiopathy. Mild ventricular and sulcal prominence represents age-related  involutional changes and volume loss. EXTRA-AXIAL SPACES: There are no abnormal extra-axial fluid collections. CALVARIA AND SOFT TISSUES: No acute calvarial or extracalvarial soft tissue  findings of concern. OTHER: The visualized paranasal sinuses are essentially clear, as are the  mastoid air cells bilaterally. _______________      Impression IMPRESSION:    No acute findings or appreciable change from 09/09/2020.    _______________        9/9/20  CT head  IMPRESSION:  No acute intracranial abnormalities      9/12/20 US ABD   IMPRESSION:   1. Cirrhotic hepatic morphology, as described above. 2.  Pronounced splenomegaly. 3.  Pronounced biliary ductal dilatation. Correlation with the patient's liver function tests is suggested. MRCP could be performed, as clinically warranted for further evaluation. PVL BL LE 9/12/20  · No evidence of deep vein thrombosis in the right lower extremity. · No evidence of deep vein thrombosis in the left lower extremity. [x]See my orders for details    My assessment, plan of care, findings, medications, side effects etc were discussed with:  [x]nursing []PT/OT    []respiratory therapy []Dr. Hill Patch [x]Patient     [x]Total critical care time exclusive of procedures 35 minutes with complex decision making performed and > 50% time spent in face to face evaluation.     Karolina Solo MD

## 2020-09-16 NOTE — PROGRESS NOTES
Problem: Falls - Risk of  Goal: *Absence of Falls  Description: Document Shamar Sahu Fall Risk and appropriate interventions in the flowsheet.   Outcome: Progressing Towards Goal  Note: Fall Risk Interventions:  Mobility Interventions: Assess mobility with egress test, Bed/chair exit alarm, Communicate number of staff needed for ambulation/transfer, OT consult for ADLs, Patient to call before getting OOB, PT Consult for mobility concerns    Mentation Interventions: Adequate sleep, hydration, pain control, Bed/chair exit alarm, Door open when patient unattended, Evaluate medications/consider consulting pharmacy, Family/sitter at bedside, Increase mobility, More frequent rounding, Reorient patient, Room close to nurse's station, Toileting rounds, Update white board    Medication Interventions: Bed/chair exit alarm, Evaluate medications/consider consulting pharmacy, Patient to call before getting OOB, Teach patient to arise slowly    Elimination Interventions: Bed/chair exit alarm, Call light in reach, Patient to call for help with toileting needs, Stay With Me (per policy), Toileting schedule/hourly rounds    History of Falls Interventions: Bed/chair exit alarm, Consult care management for discharge planning, Door open when patient unattended, Evaluate medications/consider consulting pharmacy, Room close to nurse's station

## 2020-09-16 NOTE — PROGRESS NOTES
Cardiology Progress Note        Patient: Yobani Meza        Sex: female          DOA: 9/9/2020  YOB: 1950      Age:  72 y.o.        LOS:  LOS: 7 days   Assessment/Plan     Principal Problem:    Methadone overdose ([address] (9/9/2020)    Active Problems:    Hypercarbia (9/9/2020)      Hypoxia (9/9/2020)      LIONEL (acute kidney injury) ([address] (9/9/2020)      Prolonged Q-T interval on ECG (9/9/2020)      Bradycardia (9/9/2020)      Encephalopathy acute (9/9/2020)      Thrombocytopenia ([address] (9/10/2020)      Abnormal echocardiogram (9/11/2020)      Acute on chronic respiratory failure with hypoxia and hypercapnia (HCC) (9/11/2020)      Pancytopenia ([address] (6/66/2061)      Diastolic congestive heart failure ([address] (9/13/2020)      Nonrheumatic mitral valve regurgitation (9/13/2020)      Hepatosplenomegaly (9/13/2020)        Plan:  Bradycardia improved and stable improved to above 90 heart rate on physical therapy today. Chronotropic response is intact with normal conduction. Continue with current supportive treatment  I will sign off, please call if you have any questions or concerns. Discussed with the patient. Subjective:    cc:  Bradycardia      REVIEW OF SYSTEMS:     General: No fevers or chills. Cardiovascular: No chest pain or pressure. No palpitations. No ankle swelling  Pulmonary: No SOB, orthopnea, PND  Gastrointestinal: No nausea, vomiting or diarrhea      Objective:      Visit Vitals  BP (!) 130/58   Pulse 62   Temp 98.4 °F (36.9 °C)   Resp 18   Ht 5' 2.01\" (1.575 m)   Wt 80.8 kg (178 lb 2.1 oz)   SpO2 100%   BMI 32.57 kg/m²     Body mass index is 32.57 kg/m². Physical Exam:  General Appearance: Comfortable, not using accessory muscles of respiration. NECK: No JVD, no thyroidomeglay. LUNGS: Clear bilaterally. HEART: S1+S2 audible,    ABD: Non-tender, BS Audible    EXT: No edema, and no cysnosis.   VASCULAR EXAM: Pulses are intact. PSYCHIATRIC EXAM: Mood is appropriate.     Medication:  Current Facility-Administered Medications   Medication Dose Route Frequency    insulin lispro (HUMALOG) injection   SubCUTAneous AC&HS    pantoprazole (PROTONIX) tablet 40 mg  40 mg Oral Q24H    methylPREDNISolone (PF) (SOLU-MEDROL) injection 20 mg  20 mg IntraVENous Q12H    [START ON 9/17/2020] predniSONE (DELTASONE) tablet 30 mg  30 mg Oral DAILY    methadone (DOLOPHINE) tablet 15 mg  15 mg Oral QHS    albuterol-ipratropium (DUO-NEB) 2.5 MG-0.5 MG/3 ML  3 mL Nebulization Q6H PRN    docusate sodium (COLACE) capsule 100 mg  100 mg Oral BID    polyethylene glycol (MIRALAX) packet 17 g  17 g Oral DAILY PRN    morphine injection 2 mg  2 mg IntraVENous Q4H PRN    cloNIDine HCL (CATAPRES) tablet 0.1 mg  0.1 mg Oral Q4H PRN    heparin (porcine) injection 5,000 Units  5,000 Units SubCUTAneous Q8H    furosemide (LASIX) injection 20 mg  20 mg IntraVENous DAILY    ELECTROLYTE REPLACEMENT PROTOCOL - Phosphorus  Standard Dosing  1 Each Other PRN    nicotine (NICODERM CQ) 14 mg/24 hr patch 1 Patch  1 Patch TransDERmal DAILY    budesonide (PULMICORT) 500 mcg/2 ml nebulizer suspension  500 mcg Nebulization BID RT    promethazine (PHENERGAN) 12.5 mg in 0.9% sodium chloride 50 mL IVPB  12.5 mg IntraVENous Q6H PRN    arformoteroL (BROVANA) neb solution 15 mcg  15 mcg Nebulization BID RT    sodium chloride (NS) flush 5-40 mL  5-40 mL IntraVENous Q8H    sodium chloride (NS) flush 5-40 mL  5-40 mL IntraVENous PRN    acetaminophen (TYLENOL) tablet 650 mg  650 mg Oral Q6H PRN    Or    acetaminophen (TYLENOL) suppository 650 mg  650 mg Rectal Q6H PRN    bisacodyL (DULCOLAX) suppository 10 mg  10 mg Rectal DAILY PRN    promethazine (PHENERGAN) tablet 12.5 mg  12.5 mg Oral Q6H PRN    Or    ondansetron (ZOFRAN) injection 4 mg  4 mg IntraVENous Q6H PRN    naloxone (NARCAN) injection 0.4 mg  0.4 mg IntraVENous EVERY 2 MINUTES AS NEEDED    glucose chewable tablet 16 g  4 Tab Oral PRN    glucagon (GLUCAGEN) injection 1 mg  1 mg IntraMUSCular PRN    ELECTROLYTE REPLACEMENT PROTOCOL - Potassium Standard Dosing   1 Each Other PRN    ELECTROLYTE REPLACEMENT PROTOCOL - Magnesium   1 Each Other PRN    ELECTROLYTE REPLACEMENT PROTOCOL - Calcium   1 Each Other PRN               Lab/Data Reviewed:  Procedures/imaging: see electronic medical records for all procedures/Xrays   and details which were not copied into this note but were reviewed prior to creation of Plan       All lab results for the last 24 hours reviewed. Recent Labs     09/16/20  0300 09/15/20  0417 09/14/20  0520   WBC 1.3* 1.3* 1.8*   HGB 10.1* 9.5* 10.4*   HCT 30.9* 28.9* 31.8*   PLT 46* 49* 51*     Recent Labs     09/16/20  0958 09/16/20  0300 09/15/20  0417 09/14/20  0520   NA  --  141 144 143   K 4.1 3.8 3.7 4.2   CL  --  103 108 108   CO2  --  33* 32 31   GLU  --  152* 128* 109*   BUN  --  31* 37* 32*   CREA  --  0.99 0.89 0.80   CA  --  9.1 9.2 9.3       RADIOLOGY:  CT Results  (Last 48 hours)    None        CXR Results  (Last 48 hours)               09/15/20 0637  XR CHEST PORT Final result    Impression:  IMPRESSION:       Mild interval pulmonary hypoinflation without superimposed acute radiographic   abnormality. Narrative:  EXAM: XR CHEST PORT       CLINICAL INDICATION/HISTORY: Pulmonary infiltrates   -Additional: None       COMPARISON: Several prior studies, most recently 9/12/2020       TECHNIQUE: Frontal view of the chest       _______________       FINDINGS:       HEART AND MEDIASTINUM: Stable appearing cardiac size and mediastinal contours. LUNGS AND PLEURAL SPACES: Lungs are mildly underexpanded from prior days exam.   No focal pneumonic opacity. No evidence of pneumothorax or pleural effusion.        BONY THORAX AND SOFT TISSUES: No acute osseous abnormality       _______________                   Cardiology Procedures:   Results for orders placed or performed during the hospital encounter of 09/09/20   EKG, 12 LEAD, INITIAL   Result Value Ref Range    Ventricular Rate 35 BPM    Atrial Rate 35 BPM    P-R Interval 174 ms    QRS Duration 94 ms    Q-T Interval 550 ms    QTC Calculation (Bezet) 419 ms    Calculated P Axis 26 degrees    Calculated R Axis 25 degrees    Calculated T Axis 40 degrees    Diagnosis       Marked sinus bradycardia  Abnormal ECG  Confirmed by Jessica Sharma MD, Keven Mike ([id number]) on 9/13/2020 2:15:36 PM        Echo Results  (Last 48 hours)    None       Cardiolite (Tc-99m Sestamibi) stress test    Signed By: Parker Bryant MD     September 16, 2020

## 2020-09-16 NOTE — PROGRESS NOTES
Hospitalist Progress Note-critical care note     Patient: Negro Oliva MRN: [id number]  [facility]: [id number]    YOB: 1950  Age: 72 y.o. Sex: female    DOA: 9/9/2020 LOS:  LOS: 7 days            Chief complaint: Methadone overuse hypoxia, prolonged QT bradycardia encephalopathy    Assessment/Plan         Hospital Problems  Never Reviewed          Codes Class Noted POA    Diastolic congestive heart failure (HCC) ICD-10-CM: I50.30  ICD-9-CM: 428.30, 428.0  9/13/2020 Yes        Nonrheumatic mitral valve regurgitation ICD-10-CM: I34.0  ICD-9-CM: 424.0  9/13/2020 Yes        Hepatosplenomegaly ICD-10-CM: R16.2  ICD-9-CM: 571.8  9/13/2020 Yes        Pancytopenia ([facility] ICD-10-CM: F89.098  ICD-9-CM: 284.19  9/12/2020 Yes        Abnormal echocardiogram ICD-10-CM: R93.1  ICD-9-CM: 793.2  9/11/2020 Yes        Acute on chronic respiratory failure with hypoxia and hypercapnia (HCC) ICD-10-CM: J96.21, J96.22  ICD-9-CM: 518.84, 786.09, 799.02  9/11/2020 Yes        Thrombocytopenia ([facility] ICD-10-CM: D69.6  ICD-9-CM: 287.5  9/10/2020 Yes        Hypercarbia ICD-10-CM: R06.89  ICD-9-CM: 786.09  9/9/2020 Yes        Hypoxia ICD-10-CM: R09.02  ICD-9-CM: 799.02  9/9/2020 Yes        * (Principal) Methadone overdose ([facility] ICD-10-CM: T40.3X1A  ICD-9-CM: 965.02, E980.0  9/9/2020 Yes        LIONEL (acute kidney injury) ([facility] ICD-10-CM: N17.9  ICD-9-CM: 584.9  9/9/2020 Yes        Prolonged Q-T interval on ECG ICD-10-CM: R94.31  ICD-9-CM: 794.31  9/9/2020 Yes        Bradycardia ICD-10-CM: R00.1  ICD-9-CM: 427.89  9/9/2020 Yes        Encephalopathy acute ICD-10-CM: G93.40  ICD-9-CM: 348.30  9/9/2020 Yes              Acute respiratory failure with  Hypercarbia and hypoxia   Off bipap now  Weaning nc o2 as tolerated           Prolong Q-T and bradycardia   Repeated ekg Am no long QT indicated  Methadone 15 administrated, jessee not changing   No ADRIANNA needed at this point.     chf and MVR   F/u per card   Will continue lasix   Watch for renal function and electrolytes     Methadone overdose   Alert and oriented    Dr. Earle Pedroza f/u   Cardiac monitoring ,   Optimize electrolytes   Narcan as needed  Sitter, si precaution   Appreciated psychiatrist on board, will reevaluate pt before d/c        Acute encephalopathy  Closed to her base line   Ct head no acute issue on admission  mri brain ordered-not  done       [facility]   Resolved    continue replace electrolytes as needed       DM type II   Ssi        Anemia and thrombocytopenia -chronic related cirrhosis   So far stable   Appreciated Hematologist on board       Hx of cirrhosis   Ammonia level was wnl     Subjective: feel fine, I missed one methadone, I felt tired and I took more. I had jessee before, saw Dr. Mark Yee     rn :better overnight   ,  Disposition :tbd,   Review of systems:  General: no f/c   Lung: sob is better, no wheezing  Heart : no chest pain, no palpitation   Gi :no n/v     Vital signs/Intake and Output:  Visit Vitals  BP (!) 146/117   Pulse (!) 50   Temp 97.7 °F (36.5 °C)   Resp 15   Ht 5' 2\" (1.575 m)   Wt 80.8 kg (178 lb 2.1 oz)   SpO2 99%   BMI 32.58 kg/m²     Current Shift:  No intake/output data recorded. Last three shifts:  09/14 1901 - 09/16 0700  In: 1074.2 [P.O.:500; I.V.:574.2]  Out: -     Physical Exam:  General: WD, WN. No acute distress   HEENT: NC, Atraumatic. PERRLA, anicteric sclerae. Breathing mask noted    Lungs: CTA Bilaterally. No Wheezing/Rhonchi/Rales. Heart:  Jessee ,  No murmur, No Rubs, No Gallops  Abdomen: Soft, Non distended, Non tender. +Bowel sounds,   Extremities: No c/c/e  Psych:   Not anxious or agitated.   Neurologic:   No acute neuro deficit, mild tremor noted          Labs: Results:       Chemistry Recent Labs     09/16/20  0300 09/15/20  0417 09/14/20  0520   * 128* 109*    144 143   K 3.8 3.7 4.2    108 108   CO2 33* 32 31   BUN 31* 37* 32*   CREA 0.99 0.89 0.80   CA 9.1 9.2 9.3   AGAP 5 4 4   BUCR 31* 42* 40* AP 76 80 85   TP 6.3* 6.3* 6.4   ALB 3.5 3.4 3.2*   GLOB 2.8 2.9 3.2   AGRAT 1.3 1.2 1.0      CBC w/Diff Recent Labs     09/16/20  0300 09/15/20  0417 09/14/20  0520   WBC 1.3* 1.3* 1.8*   RBC 3.73* 3.53* 3.83*   HGB 10.1* 9.5* 10.4*   HCT 30.9* 28.9* 31.8*   PLT 46* 49* 51*   GRANS 67 78* 79*   LYMPH 25 14* 15*   EOS 0 0 0      Cardiac Enzymes Recent Labs     09/16/20  0300 09/15/20  0417   * 955*      Coagulation Recent Labs     09/14/20 0520   PTP 18.1*   INR 1.5*       Lipid Panel No results found for: CHOL, CHOLPOCT, CHOLX, CHLST, CHOLV, 199853, HDL, HDLP, LDL, LDLC, DLDLP, 065787, VLDLC, VLDL, TGLX, TRIGL, TRIGP, TGLPOCT, CHHD, CHHDX   BNP No results for input(s): BNPP in the last 72 hours. Liver Enzymes Recent Labs     09/16/20  0300   TP 6.3*   ALB 3.5   AP 76      Thyroid Studies Lab Results   Component Value Date/Time    TSH 2.38 09/12/2020 04:41 AM        Procedures/imaging: see electronic medical records for all procedures/Xrays and details which were not copied into this note but were reviewed prior to creation of Plan    Ct Head Wo Cont    Result Date: 9/9/2020  EXAM: CT HEAD WO CONT CLINICAL INDICATION/HISTORY: ams COMPARISON: None. TECHNIQUE: Axial CT imaging of the head was performed without intravenous contrast. Sagittal and coronal reconstructions are provided. One or more dose reduction techniques were used on this CT: automated exposure control, adjustment of the mAs and/or kVp according to patient size, and iterative reconstruction techniques. The specific techniques used on this CT exam have been documented in the patient's electronic medical record.  Digital Imaging and Communications in Medicine (DICOM) format image data are available to nonaffiliated external healthcare facilities or entities on a secure, media free, reciprocally searchable basis with patient authorization for at least a 12-month period after this study _______________ FINDINGS: BRAIN AND POSTERIOR FOSSA: The sulci, folia, ventricles and basal cisterns are within normal limits for the patient's age. There is no intracranial hemorrhage, mass effect, or midline shift. There are scattered and confluent foci of decreased attenuation in the periventricular white matter which are nonspecific but most likely sequelae of chronic microvascular disease. EXTRA-AXIAL SPACES AND MENINGES: There are no abnormal extra-axial fluid collections. CALVARIUM: Intact. SINUSES: Clear. OTHER: Prior bilateral lens replacements. _______________     IMPRESSION: No acute intracranial abnormalities. Xr Chest Port    Result Date: 9/9/2020  EXAM: XR CHEST PORT CLINICAL INDICATION/HISTORY: OD   > Additional: Altered mental status COMPARISON: Correlation with rib series dated August 23, 2020 TECHNIQUE: Portable chest FINDINGS: SUPPORT DEVICES: None. HEART AND MEDIASTINUM: Normal size and contour. Normal pulmonary vasculature. LUNGS AND PLEURAL SPACES: Thin band of subsegmental atelectasis or scarring at the right lung base, unchanged. The lungs are otherwise well expanded and clear. No focal consolidation, effusion, or pneumothorax. BONY THORAX AND SOFT TISSUES: No acute osseous abnormality. IMPRESSION: No active cardiopulmonary disease. Xr Ribs Rt W Pa Cxr Min 3 V    Result Date: 8/23/2020  EXAM: Frontal view of the chest and 3 views of the right ribs CLINICAL INDICATION/HISTORY: Right rib pain COMPARISON: Chest radiograph dated 7/20/2011  FINDINGS: Linear opacity identified at the right lung base. No pleural effusion or pneumothorax identified. Questionable subtle cortical irregularity involving the lateral eighth and possibly ninth ribs. No other displaced rib fractures identified. IMPRESSION: 1. Equivocal findings of nondisplaced lateral right eighth and ninth rib fractures. No other rib fractures identified. 2. Linear atelectasis at the right lung base. No pleural effusion or pneumothorax identified.       Cory Fragoso MD

## 2020-09-16 NOTE — PROGRESS NOTES
Problem: Self Care Deficits Care Plan (Adult)  Goal: *Acute Goals and Plan of Care (Insert Text)  Description: Occupational Therapy Goals  Initiated 9/16/2020 within 7 day(s). 1.  Patient will perform grooming tasks while standing with Fair balance, CGA and less than 3 seated rest breaks. 2.  Patient will perform lower body dressing with minimal assistance utilizing adaptive strategies, prn.  3.  Patient will perform functional task in standing for 8 minutes with CGA and minimal verbal cues for safety with less than 2 seated rest breaks in prep for ADLs. 4.  Patient will perform toilet transfers with supervision/set-up. 5.  Patient will perform all aspects of toileting with minimal assistance. 6.  Patient will participate in upper extremity therapeutic exercise/activities for 8 minutes with supervision/set-up to maximize BUE strength for functional transfers & ADLs. 7.  Patient will utilize energy conservation techniques during functional activities with minimal verbal cues. Outcome: Progressing Towards Goal     OCCUPATIONAL THERAPY EVALUATION    Patient: Andrea Bailey (64 y.o. female)  Date: 9/16/2020  Primary Diagnosis: Methadone overdose ([facility] [T40.3X1A]  Hypercarbia [R06.89]  Hypoxia [R09.02]        Precautions:  Fall, Skin, Aspiration, Other (comment)(Suicide Precations)  PLOF: Pt reports independence with ADLs & functional mobility. ASSESSMENT :  Based on the objective data described below, the patient presents with impairments with regard to bed mobility, functional transfers, activity tolerance, bilateral UE coordination and ADL performance secondary to overdose and subsequent respiratory failure. Pt is alert & oriented at this time, increased time to provide details regarding PLOF. Upon therapist's arrival, pt on [address] with nursing & PT. Physical assistance required for bowel hygiene due to impaired standing balance & tolerance.  Pt able to maneuver back to EOB with RW support and increased time. Min A for additional sit to stand from EOB to side step towards HOB. Pt able to scoot hips back onto bed with increased time & effort. Tremors bilaterally affecting pt's ability to manage clothing. Dizziness reported at EOB; /61, HR 80s. O2 ranging from % due to inconsistent pleth. Pt left supine with HOB Elevated, bed alarm on, sitter present. Recommend continued therapy during acute care stay to maximize independence in ADLs and overall activity tolerance. Recommend HH upon d/c. Patient will benefit from skilled intervention to address the above impairments. Patient's rehabilitation potential is considered to be Good  Factors which may influence rehabilitation potential include:   []             None noted  []             Mental ability/status  [x]             Medical condition  []             Home/family situation and support systems  []             Safety awareness  []             Pain tolerance/management  []             Other:      PLAN :  Recommendations and Planned Interventions:   [x]               Self Care Training                  [x]      Therapeutic Activities  [x]               Functional Mobility Training   []      Cognitive Retraining  [x]               Therapeutic Exercises           [x]      Endurance Activities  [x]               Balance Training                    []      Neuromuscular Re-Education  []               Visual/Perceptual Training     [x]      Home Safety Training  [x]               Patient Education                   [x]      Family Training/Education  []               Other (comment):    Frequency/Duration: Patient will be followed by occupational therapy 3-5 times a week to address goals. Discharge Recommendations: Home Health  Further Equipment Recommendations for Discharge: shower chair (pt reports she has a chair but it \"does not fit. \" It may be a BSC; pt will benefit from a shower chair for energy conservation.      SUBJECTIVE:   Patient stated I just want to get out of here and go home.     OBJECTIVE DATA SUMMARY:     Past Medical History:   Diagnosis Date    Chronic back pain     Diabetes ([facility]     Drug abuse ([facility]     Hepatic cirrhosis (HCC)     Hepatitis C     Heroin abuse (HCC)     Pain management     Restless leg syndrome     Sciatica     Spleen enlarged     Thyroid disease      Past Surgical History:   Procedure Laterality Date    HX CHOLECYSTECTOMY      HX GYN      hysterectomy    HX HEENT      T&A    HX ORTHOPAEDIC      Bunion, left hand and right arm abscess removal     Barriers to Learning/Limitations: yes;  altered mental status (i.e.Sedation, Confusion)  Compensate with: visual, verbal, tactile, kinesthetic cues/model    Home Situation:   Home Situation  Home Environment: Apartment  # Steps to Enter: 0  One/Two Story Residence: One story  Living Alone: No  Support Systems: Spouse/Significant Other/Partner  Patient Expects to be Discharged to[de-identified] Apartment  Current DME Used/Available at Home: Walker, rolling, Commode, bedside, Other (comment)(power scooter)  Tub or Shower Type: Tub/Shower combination(pt reports she has shower chair, but \"it doesn't fit\")  [x]  Right hand dominant   []  Left hand dominant    Cognitive/Behavioral Status:  Neurologic State: Alert  Orientation Level: Oriented X4  Cognition: Appropriate decision making; Appropriate for age attention/concentration; Follows commands  Safety/Judgement: Awareness of environment; Fall prevention    Skin: Intact (BUEs)  Edema: None noted (BUEs)    Vision/Perceptual:    Acuity: Within Defined Limits      Coordination: BUE  Coordination: Generally decreased, functional(tremors bilaterally)  Fine Motor Skills-Upper: Right Intact; Left Intact(tremors bilaterally)    Gross Motor Skills-Upper: Right Intact; Left Intact    Balance:  Sitting: Impaired; With support  Sitting - Static: Fair (occasional)  Sitting - Dynamic: Fair (occasional)  Standing: Impaired; With support  Standing - Static: Fair  Standing - Dynamic : Fair(occasional fair-)    Strength: BUE  Strength: Within functional limits    Range of Motion: BUE  AROM: Within functional limits  PROM: Within functional limits    Functional Mobility and Transfers for ADLs:  Bed Mobility:  Supine to Sit: (pt up on BSC with PT on arrival)  Sit to Supine: Contact guard assistance  Scooting: Contact guard assistance; Additional time    Transfers:  Sit to Stand: Contact guard assistance;Minimum assistance  Stand to Sit: Contact guard assistance   Toilet Transfer : Minimum assistance    ADL Assessment:   Feeding: Setup;Supervision  Oral Facial Hygiene/Grooming: Setup;Supervision  Bathing: Moderate assistance  Upper Body Dressing: Contact guard assistance  Lower Body Dressing: Moderate assistance  Toileting: Moderate assistance    ADL Intervention:  Min A to stand from [facility]; physical assistance for bowel hygiene due to impaired activity tolerance and decreased standing balance with RW support. Cognitive Retraining  Safety/Judgement: Awareness of environment; Fall prevention    Pain:  Pain level pre-treatment: Per PT, pt reporting R ribcage pain at start of session  Pain level post-treatment: 0/10     Activity Tolerance: Fair/Fair-  Please refer to the flowsheet for vital signs taken during this treatment. After treatment:   [] Patient left in no apparent distress sitting up in chair  [x] Patient left in no apparent distress in bed  [x] Call bell left within reach  [x] Nursing notified  [x] Sitter present  [x] Bed alarm activated    COMMUNICATION/EDUCATION:   [x] Role of Occupational Therapy in the acute care setting  [x] Home safety education was provided and the patient/caregiver indicated understanding. [x] Patient/family have participated as able in goal setting and plan of care. [] Patient/family agree to work toward stated goals and plan of care. [] Patient understands intent and goals of therapy, but is neutral about his/her participation.   [] Patient is unable to participate in goal setting and plan of care. Thank you for this referral.  Alden Paris, MS OTR/L  Time Calculation: 14 mins    Eval Complexity: History: MEDIUM Complexity : Expanded review of history including physical, cognitive and psychosocial  history ; Examination: MEDIUM Complexity : 3-5 performance deficits relating to physical, cognitive , or psychosocial skils that result in activity limitations and / or participation restrictions; Decision Making:MEDIUM Complexity : Patient may present with comorbidities that affect occupational performnce.  Miniml to moderate modification of tasks or assistance (eg, physical or verbal ) with assesment(s) is necessary to enable patient to complete evaluation

## 2020-09-17 ENCOUNTER — HOME HEALTH ADMISSION (OUTPATIENT)
Dept: HOME HEALTH SERVICES | Facility: HOME HEALTH | Age: 70
End: 2020-09-17
Payer: MEDICARE

## 2020-09-17 VITALS
RESPIRATION RATE: 14 BRPM | WEIGHT: 178.13 LBS | BODY MASS INDEX: 32.78 KG/M2 | OXYGEN SATURATION: 100 % | HEIGHT: 62 IN | HEART RATE: 54 BPM | TEMPERATURE: 99.1 F | SYSTOLIC BLOOD PRESSURE: 142 MMHG | DIASTOLIC BLOOD PRESSURE: 123 MMHG

## 2020-09-17 LAB
ALBUMIN SERPL-MCNC: 3.4 G/DL (ref 3.4–5)
ALBUMIN/GLOB SERPL: 1.1 {RATIO} (ref 0.8–1.7)
ALP SERPL-CCNC: 79 U/L (ref 45–117)
ALT SERPL-CCNC: 48 U/L (ref 13–56)
ANION GAP SERPL CALC-SCNC: 6 MMOL/L (ref 3–18)
AST SERPL-CCNC: 39 U/L (ref 10–38)
BACTERIA SPEC CULT: NORMAL
BACTERIA SPEC CULT: NORMAL
BASOPHILS # BLD: 0 K/UL (ref 0–0.1)
BASOPHILS NFR BLD: 0 % (ref 0–2)
BILIRUB SERPL-MCNC: 0.7 MG/DL (ref 0.2–1)
BUN SERPL-MCNC: 27 MG/DL (ref 7–18)
BUN/CREAT SERPL: 28 (ref 12–20)
CA-I SERPL-SCNC: 1.16 MMOL/L (ref 1.12–1.32)
CALCIUM SERPL-MCNC: 9.2 MG/DL (ref 8.5–10.1)
CHLORIDE SERPL-SCNC: 100 MMOL/L (ref 100–111)
CK SERPL-CCNC: 115 U/L (ref 26–192)
CO2 SERPL-SCNC: 35 MMOL/L (ref 21–32)
CREAT SERPL-MCNC: 0.97 MG/DL (ref 0.6–1.3)
DIFFERENTIAL METHOD BLD: ABNORMAL
EOSINOPHIL # BLD: 0 K/UL (ref 0–0.4)
EOSINOPHIL NFR BLD: 0 % (ref 0–5)
ERYTHROCYTE [DISTWIDTH] IN BLOOD BY AUTOMATED COUNT: 15.6 % (ref 11.6–14.5)
GLOBULIN SER CALC-MCNC: 3.1 G/DL (ref 2–4)
GLUCOSE BLD STRIP.AUTO-MCNC: 113 MG/DL (ref 70–110)
GLUCOSE BLD STRIP.AUTO-MCNC: 115 MG/DL (ref 70–110)
GLUCOSE BLD STRIP.AUTO-MCNC: 137 MG/DL (ref 70–110)
GLUCOSE SERPL-MCNC: 102 MG/DL (ref 74–99)
HCT VFR BLD AUTO: 33.7 % (ref 35–45)
HGB BLD-MCNC: 10.9 G/DL (ref 12–16)
LYMPHOCYTES # BLD: 0.3 K/UL (ref 0.9–3.6)
LYMPHOCYTES NFR BLD: 24 % (ref 21–52)
MAGNESIUM SERPL-MCNC: 2 MG/DL (ref 1.6–2.6)
MCH RBC QN AUTO: 27.1 PG (ref 24–34)
MCHC RBC AUTO-ENTMCNC: 32.3 G/DL (ref 31–37)
MCV RBC AUTO: 83.8 FL (ref 74–97)
MONOCYTES # BLD: 0.1 K/UL (ref 0.05–1.2)
MONOCYTES NFR BLD: 9 % (ref 3–10)
NEUTS SEG # BLD: 0.9 K/UL (ref 1.8–8)
NEUTS SEG NFR BLD: 67 % (ref 40–73)
PHOSPHATE SERPL-MCNC: 3.2 MG/DL (ref 2.5–4.9)
PLATELET # BLD AUTO: 47 K/UL (ref 135–420)
PMV BLD AUTO: 9.6 FL (ref 9.2–11.8)
POTASSIUM SERPL-SCNC: 4.4 MMOL/L (ref 3.5–5.5)
PROT SERPL-MCNC: 6.5 G/DL (ref 6.4–8.2)
RBC # BLD AUTO: 4.02 M/UL (ref 4.2–5.3)
SERVICE CMNT-IMP: NORMAL
SERVICE CMNT-IMP: NORMAL
SODIUM SERPL-SCNC: 141 MMOL/L (ref 136–145)
WBC # BLD AUTO: 1.4 K/UL (ref 4.6–13.2)

## 2020-09-17 PROCEDURE — 77010033678 HC OXYGEN DAILY

## 2020-09-17 PROCEDURE — 5A09357 ASSISTANCE WITH RESPIRATORY VENTILATION, LESS THAN 24 CONSECUTIVE HOURS, CONTINUOUS POSITIVE AIRWAY PRESSURE: ICD-10-PCS | Performed by: HOSPITALIST

## 2020-09-17 PROCEDURE — 82550 ASSAY OF CK (CPK): CPT

## 2020-09-17 PROCEDURE — 83735 ASSAY OF MAGNESIUM: CPT

## 2020-09-17 PROCEDURE — 74011250637 HC RX REV CODE- 250/637: Performed by: INTERNAL MEDICINE

## 2020-09-17 PROCEDURE — 94640 AIRWAY INHALATION TREATMENT: CPT

## 2020-09-17 PROCEDURE — 74011250636 HC RX REV CODE- 250/636: Performed by: INTERNAL MEDICINE

## 2020-09-17 PROCEDURE — 74011636637 HC RX REV CODE- 636/637: Performed by: INTERNAL MEDICINE

## 2020-09-17 PROCEDURE — 84100 ASSAY OF PHOSPHORUS: CPT

## 2020-09-17 PROCEDURE — 82330 ASSAY OF CALCIUM: CPT

## 2020-09-17 PROCEDURE — 82962 GLUCOSE BLOOD TEST: CPT

## 2020-09-17 PROCEDURE — 74011250637 HC RX REV CODE- 250/637: Performed by: HOSPITALIST

## 2020-09-17 PROCEDURE — 80053 COMPREHEN METABOLIC PANEL: CPT

## 2020-09-17 PROCEDURE — 85025 COMPLETE CBC W/AUTO DIFF WBC: CPT

## 2020-09-17 PROCEDURE — 92526 ORAL FUNCTION THERAPY: CPT

## 2020-09-17 PROCEDURE — 36415 COLL VENOUS BLD VENIPUNCTURE: CPT

## 2020-09-17 PROCEDURE — 74011000250 HC RX REV CODE- 250: Performed by: INTERNAL MEDICINE

## 2020-09-17 RX ORDER — FUROSEMIDE 20 MG/1
10 TABLET ORAL DAILY
Qty: 3 TAB | Refills: 0 | Status: SHIPPED | OUTPATIENT
Start: 2020-09-17

## 2020-09-17 RX ORDER — FLUTICASONE PROPIONATE AND SALMETEROL 250; 50 UG/1; UG/1
1 POWDER RESPIRATORY (INHALATION) EVERY 12 HOURS
Qty: 1 INHALER | Refills: 0 | Status: SHIPPED | OUTPATIENT
Start: 2020-09-17

## 2020-09-17 RX ORDER — PREDNISONE 5 MG/1
TABLET ORAL
Qty: 21 TAB | Refills: 0 | Status: SHIPPED | OUTPATIENT
Start: 2020-09-17

## 2020-09-17 RX ADMIN — BUDESONIDE 500 MCG: 0.5 INHALANT RESPIRATORY (INHALATION) at 07:29

## 2020-09-17 RX ADMIN — Medication 10 ML: at 02:48

## 2020-09-17 RX ADMIN — HEPARIN SODIUM 5000 UNITS: 5000 INJECTION INTRAVENOUS; SUBCUTANEOUS at 14:04

## 2020-09-17 RX ADMIN — FUROSEMIDE 20 MG: 10 INJECTION, SOLUTION INTRAMUSCULAR; INTRAVENOUS at 14:03

## 2020-09-17 RX ADMIN — METHADONE HYDROCHLORIDE 15 MG: 10 TABLET ORAL at 14:03

## 2020-09-17 RX ADMIN — ARFORMOTEROL TARTRATE 15 MCG: 15 SOLUTION RESPIRATORY (INHALATION) at 07:29

## 2020-09-17 RX ADMIN — HEPARIN SODIUM 5000 UNITS: 5000 INJECTION INTRAVENOUS; SUBCUTANEOUS at 02:47

## 2020-09-17 RX ADMIN — PREDNISONE 30 MG: 20 TABLET ORAL at 14:03

## 2020-09-17 NOTE — DISCHARGE INSTRUCTIONS
Patient Education        Alcohol, Drug, or Poison Ingestion: Care Instructions  Your Care Instructions     A person can become very sick, or die, from swallowing or using alcohol, drugs, or poisons. Alcohol poisoning occurs when a person drinks a large amount of alcohol. Alcohol can stop nerve signals that control breathing. It can also stop the gag reflex that prevents choking. Alcohol poisoning is serious. It can lead to brain damage or death if it's not treated right away. Drugs can be used by accident or on purpose. They can be swallowed, inhaled, injected, or absorbed through the skin. Drugs include over-the-counter medicine (such as aspirin or acetaminophen) and prescription medicine. They also include vitamins and supplements. And they include illegal drugs such as cocaine and heroin. And poisons are all around us. They include household , cosmetics, houseplants, and garden chemicals. The doctor has checked you carefully, but problems can develop later. If you notice any problems or new symptoms, get medical treatment right away. Follow-up care is a key part of your treatment and safety. Be sure to make and go to all appointments, and call your doctor if you are having problems. It's also a good idea to know your test results and keep a list of the medicines you take. How can you care for yourself at home? Alcohol problems  · Talk to your doctor or counselor about programs that can help you stop using alcohol. · Plan ways to avoid being tempted to drink. ? Get rid of all alcohol in your home. ? Avoid places where you tend to drink. ? Stay away from places or events that offer alcohol. ? Stay away from people who drink a lot. Drug problems  · Talk to your doctor about programs that can help you stop using drugs. · Get rid of any drugs you might be tempted to misuse. · Learn how to say no when other people use drugs. · Don't spend time with people who use drugs.   Poison prevention  · Keep products in the containers they came in. Keep them with the original labels. · Be careful when you use cleaning products, paints, solvents, and pesticides. Read labels before use. Use a fan to move strong odors and fumes out of your home. · Do not mix cleaning products. Try to use nontoxic . These include vinegar, lemon juice, and baking soda. When should you call for help? Poison control centers, hospitals, or your doctor can give immediate advice in the case of a poisoning. The [facility] number is [phone]. Have the poison container with you so you can give complete information to the poison control center, such as what the poison or substance is, how much was taken and when. Do not try to make the person vomit. Call 911 anytime you think you may need emergency care. For example, call if you or someone else:    · Has used or currently uses alcohol or drugs and is very confused or can't stay awake.     · Has passed out (lost consciousness).     · Has severe trouble breathing.     · Is having a seizure. Call your doctor now or seek immediate medical care if you or someone else:    · Has new symptoms, or is not acting normally. Watch closely for changes in your health, and be sure to contact your doctor if:    · You do not get better as expected.     · You need help with drug or alcohol problems.     · You have problems with depression or other mental health issues. Where can you learn more? Go to http://vic-angle.info/  Enter A385 in the search box to learn more about \"Alcohol, Drug, or Poison Ingestion: Care Instructions. \"  Current as of: June 26, 2019               Content Version: 12.6  © 5059-6273 [employer]. Care instructions adapted under license by [employer] (which disclaims liability or warranty for this information).  If you have questions about a medical condition or this instruction, always ask your healthcare professional. [place] [address] any warranty or liability for your use of this information.

## 2020-09-17 NOTE — PROGRESS NOTES
Case discussed with Cedric Meyers NP at [address] [place] [address] Group  Per calling [phone]. Inform her pt was admitted here due to OD methadone, current on methadone 15 mg. Recommend continue tapering. She reported that she will be seen per director tomorrow morning. D/c planning discussed with  and he agrees with the plan.

## 2020-09-17 NOTE — PROGRESS NOTES
1920- Report and care received, assessment completed per flow sheet. Alert, oriented, NAD.     2300- Reassessment completed and without change. 0300- Reassessment completed and without change.

## 2020-09-17 NOTE — HOME CARE
Spoke with patient via phone. Verified address, telephone number. Explained home care services and routine. Orders noted and arranged.      Sourav Fine LPN

## 2020-09-17 NOTE — PROGRESS NOTES
Discharge instructions reviewed with the patient. Patient verbalized understanding and verified by teach back. All questions answered. IV discontinued by primary nurse, no redness, swelling or pain noted. Patient awaiting friend for transportation home, with an ETA of 1830. Patient armband removed and shredded.

## 2020-09-17 NOTE — PROGRESS NOTES
Problem: Mobility Impaired (Adult and Pediatric)  Goal: *Acute Goals and Plan of Care (Insert Text)  Description: Physical Therapy Goals   Initiated 9/16/2020 and to be accomplished within 5-7 day(s)  1. Patient will move from supine <> sit with S in prep for out of bed activity and change of position. 2.  Patient will perform sit<> stand with S with LRAD in prep for transfers/ambulation. 3.  Patient will transfer from bed <> chair with S with LRAD for time up in chair for completion of ADL activity. 4.  Patient will ambulate 150 feet with  S/LRAD for improved functional mobility at discharge. Note: Pt being d/c from hospital and awaiting her transportation. Goals not met at time of d/c.

## 2020-09-17 NOTE — PROGRESS NOTES
Problem: Dysphagia (Adult)  Goal: *Acute Goals and Plan of Care (Insert Text)  Description: Recommendations:  Diet: Mechanical soft solids, thin liquid   Meds: Per patient preference  Aspiration Precautions  Oral Care TID  Other: Feeding assistance    Goals:  Patient will:  1. Tolerate PO trials with 0 s/s overt distress in 4/5 trials  2. Utilize compensatory swallow strategies/maneuvers (decrease bite/sip, size/rate, alt. liq/sol) with min cues in 4/5 trials  3. Perform oral-motor/laryngeal exercises to increase oropharyngeal swallow function with min cues  4. Complete an objective swallow study (i.e., MBSS) to assess swallow integrity, r/o aspiration, and determine of safest LRD, min A  Outcome: Progressing Towards Goal     SPEECH LANGUAGE PATHOLOGY BEDSIDE SWALLOW EVALUATION     Patient: Shannon Breaux (50 y.o. female)  Date: 9/17/2020  Primary Diagnosis: Methadone overdose ([facility] [address] [T40.3X1A]  Hypercarbia [R06.89]  Hypoxia [R09.02]        Precautions: Aspiration     PLOF: Independent     ASSESSMENT :  Patient presents with mild oropharyngeal dysphagia. Patient A&Ox4, and has been most functional today per nursing report. Reports hx of dysphagia but was unable to provide any additional information. Patient accepted SLP-fed thin liquid via straw with serial swallows, puree and solid trials; audible swallow and delayed mastication of solids noted however no overt s/sx of aspiration observed across all trials. Recommend continue mechanical soft solid, thin liquid diet with aspiration precautions, feeding assistance, small bites/sips, slow rate. Patient must be alert for all PO intake. Patient may increase diet consistency as tolerated without overt s/sx aspiration in the home environment.       Patient will benefit from skilled intervention to address the above impairments.   Patient's rehabilitation potential is considered to be Fair  Factors which may influence rehabilitation potential include:   []? None noted  [x]? Mental ability/status  [x]? Medical condition  []? Home/family situation and support systems  [x]? Safety awareness  []? Pain tolerance/management  []? Other:       PLAN :  Recommendations and Planned Interventions:  Mechanical soft solids, thin liquid   Frequency/Duration: Patient will be followed by speech-language pathology 1-2 times per day/4-7 days per week to address goals. Discharge Recommendations: To Be Determined      SUBJECTIVE:   Patient stated Doing ok.      OBJECTIVE:           Past Medical History:   Diagnosis Date    Chronic back pain      Diabetes ([facility] [address]      Drug abuse (HCC)      Hepatic cirrhosis (HCC)      Hepatitis C      Heroin abuse (HCC)      Pain management      Restless leg syndrome      Sciatica      Spleen enlarged      Thyroid disease             Past Surgical History:   Procedure Laterality Date    HX CHOLECYSTECTOMY        HX GYN         hysterectomy    HX HEENT         T&A    HX ORTHOPAEDIC         Bunion, left hand and right arm abscess removal     Diet prior to admission: Regular/thin ? Current Diet:  Mech-soft/thin      Cognitive and Communication Status:  Neurologic State: Alert, Confused, Restless  Orientation Level: Oriented X4  Cognition: Decreased command following, Impaired decision making  Perception: Appears intact  Perseveration: No perseveration noted  Safety/Judgement: Awareness of environment  Oral Assessment:  Oral Assessment  Labial: No impairment  Dentition: Upper dentures  Oral Hygiene: Fair  Lingual: Decreased rate;Decreased strength; Incoordinated  Velum: No impairment  Mandible: No impairment  P.O. Trials:  Patient Position: HOB 60  Vocal quality prior to P.O.: No impairment  Consistency Presented: Thin liquid;Puree; Solid  How Presented: SLP-fed/presented;Straw;Self-fed/presented;Cup/sip  Bolus Acceptance: No impairment  Bolus Formation/Control: Impaired  Type of Impairment: Delayed;Mastication  Propulsion: No impairment  Oral Residue: None  Initiation of Swallow: No impairment  Laryngeal Elevation: Functional  Aspiration Signs/Symptoms: None  Pharyngeal Phase Characteristics: No impairment, issues, or problems   Effective Modifications: Alternate liquids/solids;Small sips and bites  Cues for Modifications: Moderate     Oral Phase Severity: Mild  Pharyngeal Phase Severity : Mild     PAIN:  Pain level pre-treatment: 0/10   Pain level post-treatment: 0/10      After treatment:   []? Patient left in no apparent distress sitting up in chair  [x]? Patient left in no apparent distress in bed  [x]? Call bell left within reach  [x]? Nursing notified  []? Family present  []? Caregiver present  []? Bed alarm activated     COMMUNICATION/EDUCATION:   [x]? Aspiration precautions; swallow safety; compensatory techniques. [x]? Patient/family have participated as able in goal setting and plan of care. []? Patient/family agree to work toward stated goals and plan of care. []? Patient understands intent and goals of therapy; neutral about participation. []? Patient unable to participate in goal setting/plan of care; educ ongoing with interdisciplinary staff  []? Posted safety precautions in patient's room.     Thank you for this referral,  Mireya Isaac M.Ed, CCC-SLP  Time Calculation: 12 mins

## 2020-09-17 NOTE — DISCHARGE SUMMARY
Discharge Summary    Patient: Maryanne Vines MRN: [id number]  CSN: [id number]    YOB: 1950  Age: 72 y.o.   Sex: female    DOA: 9/9/2020 LOS:  LOS: 8 days   Discharge Date:      Primary Care Provider:  Other, MD Boo    Admission Diagnoses: Methadone overdose ([facility] [T40.3X1A]  Hypercarbia [R06.89]  Hypoxia [R09.02]    Discharge Diagnoses:    Hospital Problems  Never Reviewed          Codes Class Noted POA    Diastolic congestive heart failure ([facility] ICD-10-CM: I50.30  ICD-9-CM: 428.30, 428.0  9/13/2020 Yes        Nonrheumatic mitral valve regurgitation ICD-10-CM: I34.0  ICD-9-CM: 424.0  9/13/2020 Yes        Hepatosplenomegaly ICD-10-CM: R16.2  ICD-9-CM: 571.8  9/13/2020 Yes        Pancytopenia ([place] [address].) ICD-10-CM: W58.487  ICD-9-CM: 284.19  9/12/2020 Yes        Abnormal echocardiogram ICD-10-CM: R93.1  ICD-9-CM: 793.2  9/11/2020 Yes        Acute on chronic respiratory failure with hypoxia and hypercapnia (HCC) ICD-10-CM: J96.21, J96.22  ICD-9-CM: 518.84, 786.09, 799.02  9/11/2020 Yes        Thrombocytopenia ([facility] ICD-10-CM: D69.6  ICD-9-CM: 287.5  9/10/2020 Yes        Hypercarbia ICD-10-CM: R06.89  ICD-9-CM: 786.09  9/9/2020 Yes        Hypoxia ICD-10-CM: R09.02  ICD-9-CM: 799.02  9/9/2020 Yes        * (Principal) Methadone overdose ([facility] ICD-10-CM: T40.3X1A  ICD-9-CM: 965.02, E980.0  9/9/2020 Yes        LIONEL (acute kidney injury) ([facility] ICD-10-CM: N17.9  ICD-9-CM: 584.9  9/9/2020 Yes        Prolonged Q-T interval on ECG ICD-10-CM: R94.31  ICD-9-CM: 794.31  9/9/2020 Yes        Bradycardia ICD-10-CM: R00.1  ICD-9-CM: 427.89  9/9/2020 Yes        Encephalopathy acute ICD-10-CM: G93.40  ICD-9-CM: 348.30  9/9/2020 Yes              Discharge Condition: stable     Discharge Medications:     Current Discharge Medication List      START taking these medications    Details   predniSONE (STERAPRED) 5 mg dose pack See administration instruction per 5mg dose pack  Qty: 21 Tab, Refills: 0      fluticasone propion-salmeteroL (Advair Diskus) 250-50 mcg/dose diskus inhaler Take 1 Puff by inhalation every twelve (12) hours. Qty: 1 Inhaler, Refills: 0      albuterol sulfate (PROAIR RESPICLICK) 90 mcg/actuation breath activated inhaler Take 1-2 Puffs by inhalation every four (4) hours as needed for Wheezing, Shortness of Breath, Respiratory Distress or Cough. Qty: 1 Inhaler, Refills: 0      furosemide (Lasix) 20 mg tablet Take 0.5 Tabs by mouth daily. Qty: 3 Tab, Refills: 0         CONTINUE these medications which have NOT CHANGED    Details   prazosin (MINIPRESS) 2 mg capsule Take 2 mg by mouth nightly. vit A/vit C/vit E/zinc/copper (PRESERVISION AREDS PO) Take 1 Cap by mouth two (2) times a day. rifAXIMin (XIFAXAN) 550 mg tablet Take 550 mg by mouth two (2) times a day. zinc 50 mg tab tablet Take 50 mg by mouth daily. lurasidone (Latuda) 40 mg tab tablet Take 40 mg by mouth daily (with dinner). pregabalin (Lyrica) 75 mg capsule Take 75 mg by mouth two (2) times a day. levothyroxine (SYNTHROID) 50 mcg tablet Take 50 mcg by mouth Daily (before breakfast). rOPINIRole (REQUIP) 0.25 mg tablet Take 2 mg by mouth nightly as needed. Indications: restless legs syndrome, an extreme discomfort in the calf muscles when sitting or lying down      spironolactone (ALDACTONE) 25 mg tablet Take  by mouth daily.          STOP taking these medications       escitalopram oxalate (LEXAPRO) 20 mg tablet Comments:   Reason for Stopping:         methadone (DOLOPHINE) 10 mg/mL solution Comments:   Reason for Stopping:         hydrOXYzine (VISTARIL) 25 mg capsule Comments:   Reason for Stopping:         traZODone (DESYREL) 100 mg tablet Comments:   Reason for Stopping:               Procedures : none     Consults: Cardiology and Pulmonary/Critical Care psychiatrist       PHYSICAL EXAM   Visit Vitals  BP (!) 143/55   Pulse (!) 47   Temp 98.5 °F (36.9 °C)   Resp 8   Ht 5' 2.01\" (1.575 m)   Wt 80.8 kg (178 lb 2.1 oz)   SpO2 91%   BMI 32.57 kg/m²     General: Awake, cooperative, no acute distress    HEENT: NC, Atraumatic. PERRLA, EOMI. Anicteric sclerae. Lungs:  CTA Bilaterally. No Wheezing/Rhonchi/Rales. Heart:  Hari ,  No murmur, No Rubs, No Gallops  Abdomen: Soft, Non distended, Non tender. +Bowel sounds,   Extremities: No c/c/e  Psych:   Not anxious or agitated. Neurologic:  No acute neurological deficits. Admission HPI :   Justin Al is a 72 y.o. female with depression, heroin abuse on methadone was sent to ER per EMS due to mental status changes. Per ER reported, she became confused and suspected taking additional methadone. She was given 1.5 mg Narcan per EMS, her mental status and confusion got improving. She received another 2 mg Narcan in our ER, she become more awake and alert. She presented hypoxia in the ED, ABG was done. EKG indicated prolonged QT. CT head stat ordered, no acute process.     Patient was seen and examined in ER, she is not a good historian, above history came from ER report. She was alert oriented to herself only   Hospital Course :   Justin Al is a 72 y.o. female with depression, heroin abuse on methadone was admitted due to methadone  Overdose and acute respiratory failure. She was admitted to icu and bipap was put on. Narcan gtt was given for methadone overdose. Sitter was put for possible SI. Pulmonologist was on board, precedex was started and hold due to bradycardia. Cardiologist on board for abnormal echo and prolong Q-T and bradycardia. metahdone had been hold. Electrolytes was replaced per icu protocol. Ct head was done due to confusion and agitation, which no acute issue. She also presented derick and resolved per iv hydration. Hematologist on board for her pancytopenia, no ttp and dic indicated, her pancytopenia was due to  Cirrhosis.      She is a smoker and she received breathing treatment and steroid for possible copd.    With the treatment, her prolong QT resolved. , low dose methadone was started and no long QT indicated. She became alert and oriented and mental status was back to her baseline. Psychiatrist was on board, recommended \"avoid QT prolonging agents like Lexapro, Vistaril, trazodone that she was taking previously as an outpatient. Can restart Latuda 20mg daily for Bipolar depression     NO Need for Psych admission can be discharged when medically stable and cleared. She is [facility]\". She has asymptomatic bradycardia, her HR was up to 90s with physical activity. She also received PT/OT and recommend home health. Before discharge, she is alert and oriented , she needs to f/u with her psychiatrist and pulmonologist for lung function testing. Case discussed with Tere Garcia NP at [address] [place] [address] Group  Per calling [phone]. Inform her pt was admitted here due to OD methadone, current on methadone 15 mg. Recommend continue tapering. She reported that she will be seen per director tomorrow morning. D/c planning discussed with  and he agrees with the plan. Discharge planning discussed with patient, pt agrees  with the plan and no questions and concerns at this point. Activity: Activity as tolerated    [facility]     Follow-up: PCP . [facility], [address] [place] [address] 9/18/2020.  psychiatrist     Disposition: home /rehab /snf     Minutes spent on discharge: 45 min       Labs: Results:       Chemistry Recent Labs     09/17/20  0345 09/16/20  0958 09/16/20  0300 09/15/20  0417   *  --  152* 128*     --  141 144   K 4.4 4.1 3.8 3.7     --  103 108   CO2 35*  --  33* 32   BUN 27*  --  31* 37*   CREA 0.97  --  0.99 0.89   CA 9.2  --  9.1 9.2   AGAP 6  --  5 4   BUCR 28*  --  31* 42*   AP 79  --  76 80   TP 6.5  --  6.3* 6.3*   ALB 3.4  --  3.5 3.4   GLOB 3.1  --  2.8 2.9   AGRAT 1.1  --  1.3 1. 2      CBC w/Diff Recent Labs     09/17/20  0345 09/16/20  0300 09/15/20  0417   WBC 1.4* 1.3* 1.3*   RBC 4.02* 3.73* 3.53*   HGB 10.9* 10.1* 9.5*   HCT 33.7* 30.9* 28.9*   PLT 47* 46* 49*   GRANS 67 67 78*   LYMPH 24 25 14*   EOS 0 0 0      Cardiac Enzymes Recent Labs     09/17/20  0345 09/16/20  0300    313*      Coagulation No results for input(s): PTP, INR, APTT, INREXT in the last 72 hours. Lipid Panel No results found for: CHOL, CHOLPOCT, CHOLX, CHLST, CHOLV, 270126, HDL, HDLP, LDL, LDLC, DLDLP, 940761, VLDLC, VLDL, TGLX, TRIGL, TRIGP, TGLPOCT, CHHD, CHHDX   BNP No results for input(s): BNPP in the last 72 hours. Liver Enzymes Recent Labs     09/17/20 0345   TP 6.5   ALB 3.4   AP 79      Thyroid Studies Lab Results   Component Value Date/Time    TSH 2.38 09/12/2020 04:41 AM          @micro    Significant Diagnostic Studies: Ct Head Wo Cont    Result Date: 9/12/2020  _______________ EXAM: CT HEAD WO CONT. _______________ CLINICAL INDICATION/HISTORY: Bradycardia and neuro fluctuations. -Additional: None. COMPARISON: CT of 09/09/2020. _______________ TECHNIQUE/COMPARISON: Nonenhanced CT examination of the head was performed. Sagittal and coronal series were reformatted from the axial source images. One or more dose reduction techniques were used on this CT: automated exposure control, adjustment of the mAs and/or kVp according to patient size, and iterative reconstruction techniques. The specific techniques used on this CT exam have been documented in the patient's electronic medical record. Digital Imaging and Communications in Medicine (DICOM) format image data are available to nonaffiliated external healthcare facilities or entities on a secure, media free, reciprocally searchable basis with patient authorization for at least a 12-month period after this study.  _______________ FINDINGS: BRAIN AND POSTERIOR FOSSA: There is no evidence of acute vascular territorial ischemia, intracranial hemorrhage, midline shift or mass effect. Periventricular and subcortical white matter hypoattenuation is most compatible with chronic microangiopathy. Mild ventricular and sulcal prominence represents age-related involutional changes and volume loss. EXTRA-AXIAL SPACES: There are no abnormal extra-axial fluid collections. CALVARIA AND SOFT TISSUES: No acute calvarial or extracalvarial soft tissue findings of concern. OTHER: The visualized paranasal sinuses are essentially clear, as are the mastoid air cells bilaterally. _______________     IMPRESSION: No acute findings or appreciable change from 09/09/2020. _______________     Ct Head Wo Cont    Result Date: 9/9/2020  EXAM: CT HEAD WO CONT CLINICAL INDICATION/HISTORY: ams COMPARISON: None. TECHNIQUE: Axial CT imaging of the head was performed without intravenous contrast. Sagittal and coronal reconstructions are provided. One or more dose reduction techniques were used on this CT: automated exposure control, adjustment of the mAs and/or kVp according to patient size, and iterative reconstruction techniques. The specific techniques used on this CT exam have been documented in the patient's electronic medical record. Digital Imaging and Communications in Medicine (DICOM) format image data are available to nonaffiliated external healthcare facilities or entities on a secure, media free, reciprocally searchable basis with patient authorization for at least a 12-month period after this study _______________ FINDINGS: BRAIN AND POSTERIOR FOSSA: The sulci, folia, ventricles and basal cisterns are within normal limits for the patient's age. There is no intracranial hemorrhage, mass effect, or midline shift. There are scattered and confluent foci of decreased attenuation in the periventricular white matter which are nonspecific but most likely sequelae of chronic microvascular disease. EXTRA-AXIAL SPACES AND MENINGES: There are no abnormal extra-axial fluid collections. CALVARIUM: Intact. SINUSES: Clear. OTHER: Prior bilateral lens replacements. _______________     IMPRESSION: No acute intracranial abnormalities. Us Abd Comp    Result Date: 9/13/2020  EXAM: Abdominal ultrasound 9/12/2020 HISTORY: Pancytopenia. History of hepatitis C and drug abuse. TECHNIQUE: Real-time evaluation of the abdomen was performed. Grayscale, color flow Doppler imaging, and velocity spectral waveform analysis of the portal vein was performed (duplex imaging). COMPARISON:  None. FINDINGS: On the submitted images, the liver is small with a length of 12.7 cm. The liver is heterogeneous with coarsened echotexture. No focal hepatic abnormalities are identified. The portal vein is patent and normal in diameter at 1.2 cm with hepatopedal flow. The gallbladder is surgically absent. There is pronounced dilatation of the common bile duct which measures approximately 2.5 cm in diameter with mild intrahepatic biliary ductal dilatation. The visualized portions of the pancreas, abdominal aorta, and IVC are unremarkable. There is pronounced splenic enlargement with the spleen measuring 18.1 cm in length. Trace ascites is noted. The right and left kidneys measure 10.9 and 10.0 cm in length, respectively. There is a 2.2 cm simple appearing cyst in the interpolar region of the right kidney. There is no evidence of urolithiasis or hydronephrosis. Cortical thinning is noted bilaterally but cortical echogenicity is within normal limits. IMPRESSION:  1. Cirrhotic hepatic morphology, as described above. 2.  Pronounced splenomegaly. 3.  Pronounced biliary ductal dilatation. Correlation with the patient's liver function tests is suggested. MRCP could be performed, as clinically warranted for further evaluation.     Xr Chest Port    Result Date: 9/15/2020  EXAM: XR CHEST PORT CLINICAL INDICATION/HISTORY: Pulmonary infiltrates -Additional: None COMPARISON: Several prior studies, most recently 9/12/2020 TECHNIQUE: Frontal view of the chest _______________ FINDINGS: HEART AND MEDIASTINUM: Stable appearing cardiac size and mediastinal contours. LUNGS AND PLEURAL SPACES: Lungs are mildly underexpanded from prior days exam. No focal pneumonic opacity. No evidence of pneumothorax or pleural effusion. BONY THORAX AND SOFT TISSUES: No acute osseous abnormality _______________     IMPRESSION: Mild interval pulmonary hypoinflation without superimposed acute radiographic abnormality. Xr Chest Port    Result Date: 9/12/2020  EXAM: Portable chest radiograph 9/12/2020 CLINICAL INDICATION/HISTORY: Hypoxia. TECHNIQUE: A semierect portable radiograph was obtained. COMPARISON: 9/9/2020. FINDINGS: The cardiac and mediastinal contours are within normal limits. There is mild left infrahilar atelectasis or scar. Underlying infiltrate cannot be excluded. The lungs are otherwise clear. There is no pneumothorax or pleural effusion. IMPRESSION:  1. Mild left infrahilar atelectasis, scar, and/or infiltrate. Xr Chest Port    Result Date: 9/9/2020  EXAM: XR CHEST PORT CLINICAL INDICATION/HISTORY: OD   > Additional: Altered mental status COMPARISON: Correlation with rib series dated August 23, 2020 TECHNIQUE: Portable chest _______________ FINDINGS: SUPPORT DEVICES: None. HEART AND MEDIASTINUM: Normal size and contour. Normal pulmonary vasculature. LUNGS AND PLEURAL SPACES: Thin band of subsegmental atelectasis or scarring at the right lung base, unchanged. The lungs are otherwise well expanded and clear. No focal consolidation, effusion, or pneumothorax. BONY THORAX AND SOFT TISSUES: No acute osseous abnormality. _______________     IMPRESSION: No active cardiopulmonary disease.     Xr Ribs Rt W Pa Cxr Min 3 V    Result Date: 8/23/2020  EXAM: Frontal view of the chest and 3 views of the right ribs CLINICAL INDICATION/HISTORY: Right rib pain COMPARISON: Chest radiograph dated 7/20/2011 _______________ FINDINGS: Linear opacity identified at the right lung base. No pleural effusion or pneumothorax identified. Questionable subtle cortical irregularity involving the lateral eighth and possibly ninth ribs. No other displaced rib fractures identified. _______________     IMPRESSION: 1. Equivocal findings of nondisplaced lateral right eighth and ninth rib fractures. No other rib fractures identified. 2. Linear atelectasis at the right lung base. No pleural effusion or pneumothorax identified.             [facility]     CC: Other, Phys, MD

## 2020-09-17 NOTE — PROGRESS NOTES
Cm met with pt at bedside, plan for today includes discharge, pt plans to return back home with spouse, f/u  Tomorrow at [facility] for methadone management and [facility] for psych f/u , pt would like to f/u with pulmonary thru [address] [facility] [facility] instead of [facility].

## 2020-09-19 ENCOUNTER — HOME CARE VISIT (OUTPATIENT)
Dept: SCHEDULING | Facility: HOME HEALTH | Age: 70
End: 2020-09-19

## 2020-09-19 LAB
ATRIAL RATE: 53 BPM
CALCULATED P AXIS, ECG09: 68 DEGREES
CALCULATED R AXIS, ECG10: 29 DEGREES
CALCULATED T AXIS, ECG11: 49 DEGREES
DIAGNOSIS, 93000: NORMAL
FOLATE BLD-MCNC: 314 NG/ML
FOLATE RBC-MCNC: 978 NG/ML
HCT VFR BLD AUTO: 32.1 % (ref 34–46.6)
P-R INTERVAL, ECG05: 162 MS
Q-T INTERVAL, ECG07: 482 MS
QRS DURATION, ECG06: 98 MS
QTC CALCULATION (BEZET), ECG08: 452 MS
VENTRICULAR RATE, ECG03: 53 BPM

## 2020-09-19 PROCEDURE — G0299 HHS/HOSPICE OF RN EA 15 MIN: HCPCS

## 2020-09-21 ENCOUNTER — HOME CARE VISIT (OUTPATIENT)
Dept: HOME HEALTH SERVICES | Facility: HOME HEALTH | Age: 70
End: 2020-09-21

## 2020-09-21 VITALS
TEMPERATURE: 97.8 F | DIASTOLIC BLOOD PRESSURE: 80 MMHG | OXYGEN SATURATION: 98 % | HEART RATE: 60 BPM | RESPIRATION RATE: 17 BRPM | SYSTOLIC BLOOD PRESSURE: 146 MMHG

## 2020-09-21 NOTE — PROGRESS NOTES
Patient is refusing SN to continue to continue to come see her. Case communication with Ruben Couch RN, that patient needs to be discharged from last SN visit. PT and OT was not mentioned, so do not know if patient is interested in these services. Case communication also with Kenny Garcia and Karen Man regarding PT and OT.

## 2020-09-22 ENCOUNTER — HOME CARE VISIT (OUTPATIENT)
Dept: SCHEDULING | Facility: HOME HEALTH | Age: 70
End: 2020-09-22

## 2020-09-22 ENCOUNTER — HOME CARE VISIT (OUTPATIENT)
Dept: HOME HEALTH SERVICES | Facility: HOME HEALTH | Age: 70
End: 2020-09-22

## 2020-10-11 ENCOUNTER — HOSPITAL ENCOUNTER (EMERGENCY)
Age: 70
Discharge: HOME | End: 2020-10-11
Attending: EMERGENCY MEDICINE
Payer: MEDICARE

## 2020-10-11 VITALS
TEMPERATURE: 98.4 F | SYSTOLIC BLOOD PRESSURE: 145 MMHG | BODY MASS INDEX: 30.91 KG/M2 | HEART RATE: 56 BPM | WEIGHT: 168 LBS | DIASTOLIC BLOOD PRESSURE: 52 MMHG | RESPIRATION RATE: 14 BRPM | OXYGEN SATURATION: 100 % | HEIGHT: 62 IN

## 2020-10-11 DIAGNOSIS — R41.89 EPISODE OF UNRESPONSIVENESS: Primary | ICD-10-CM

## 2020-10-11 LAB
ALBUMIN SERPL-MCNC: 3.6 G/DL (ref 3.4–5)
ALBUMIN/GLOB SERPL: 1.1 {RATIO} (ref 0.8–1.7)
ALP SERPL-CCNC: 74 U/L (ref 45–117)
ALT SERPL-CCNC: 21 U/L (ref 13–56)
AMPHET UR QL SCN: NEGATIVE
ANION GAP SERPL CALC-SCNC: 8 MMOL/L (ref 3–18)
APPEARANCE UR: CLEAR
AST SERPL-CCNC: 31 U/L (ref 10–38)
ATRIAL RATE: 75 BPM
BACTERIA URNS QL MICRO: ABNORMAL /HPF
BARBITURATES UR QL SCN: NEGATIVE
BASOPHILS # BLD: 0 K/UL (ref 0–0.1)
BASOPHILS NFR BLD: 0 % (ref 0–2)
BENZODIAZ UR QL: NEGATIVE
BILIRUB SERPL-MCNC: 0.5 MG/DL (ref 0.2–1)
BILIRUB UR QL: NEGATIVE
BUN SERPL-MCNC: 24 MG/DL (ref 7–18)
BUN/CREAT SERPL: 27 (ref 12–20)
CALCIUM SERPL-MCNC: 9.6 MG/DL (ref 8.5–10.1)
CALCULATED P AXIS, ECG09: 39 DEGREES
CALCULATED R AXIS, ECG10: -17 DEGREES
CALCULATED T AXIS, ECG11: 39 DEGREES
CANNABINOIDS UR QL SCN: POSITIVE
CHLORIDE SERPL-SCNC: 100 MMOL/L (ref 100–111)
CK MB CFR SERPL CALC: 2.3 % (ref 0–4)
CK MB SERPL-MCNC: 1.6 NG/ML (ref 5–25)
CK SERPL-CCNC: 71 U/L (ref 26–192)
CO2 SERPL-SCNC: 29 MMOL/L (ref 21–32)
COCAINE UR QL SCN: NEGATIVE
COLOR UR: YELLOW
CREAT SERPL-MCNC: 0.88 MG/DL (ref 0.6–1.3)
DIAGNOSIS, 93000: NORMAL
DIFFERENTIAL METHOD BLD: ABNORMAL
EOSINOPHIL # BLD: 0 K/UL (ref 0–0.4)
EOSINOPHIL NFR BLD: 1 % (ref 0–5)
EPITH CASTS URNS QL MICRO: ABNORMAL /LPF (ref 0–5)
ERYTHROCYTE [DISTWIDTH] IN BLOOD BY AUTOMATED COUNT: 14.8 % (ref 11.6–14.5)
GLOBULIN SER CALC-MCNC: 3.2 G/DL (ref 2–4)
GLUCOSE SERPL-MCNC: 105 MG/DL (ref 74–99)
GLUCOSE UR STRIP.AUTO-MCNC: NEGATIVE MG/DL
HCT VFR BLD AUTO: 38.5 % (ref 35–45)
HDSCOM,HDSCOM: ABNORMAL
HGB BLD-MCNC: 12.4 G/DL (ref 12–16)
HGB UR QL STRIP: NEGATIVE
HYALINE CASTS URNS QL MICRO: ABNORMAL /LPF (ref 0–2)
KETONES UR QL STRIP.AUTO: NEGATIVE MG/DL
LEUKOCYTE ESTERASE UR QL STRIP.AUTO: NEGATIVE
LYMPHOCYTES # BLD: 1 K/UL (ref 0.9–3.6)
LYMPHOCYTES NFR BLD: 36 % (ref 21–52)
MCH RBC QN AUTO: 27.2 PG (ref 24–34)
MCHC RBC AUTO-ENTMCNC: 32.2 G/DL (ref 31–37)
MCV RBC AUTO: 84.4 FL (ref 74–97)
METHADONE UR QL: NEGATIVE
MONOCYTES # BLD: 0.3 K/UL (ref 0.05–1.2)
MONOCYTES NFR BLD: 9 % (ref 3–10)
NEUTS SEG # BLD: 1.5 K/UL (ref 1.8–8)
NEUTS SEG NFR BLD: 54 % (ref 40–73)
NITRITE UR QL STRIP.AUTO: NEGATIVE
OPIATES UR QL: NEGATIVE
P-R INTERVAL, ECG05: 158 MS
PCP UR QL: NEGATIVE
PH UR STRIP: 7 [PH] (ref 5–8)
PLATELET # BLD AUTO: 59 K/UL (ref 135–420)
PLATELET COMMENTS,PCOM: ABNORMAL
PMV BLD AUTO: 11.1 FL (ref 9.2–11.8)
POTASSIUM SERPL-SCNC: 4.9 MMOL/L (ref 3.5–5.5)
PROT SERPL-MCNC: 6.8 G/DL (ref 6.4–8.2)
PROT UR STRIP-MCNC: ABNORMAL MG/DL
Q-T INTERVAL, ECG07: 408 MS
QRS DURATION, ECG06: 104 MS
QTC CALCULATION (BEZET), ECG08: 455 MS
RBC # BLD AUTO: 4.56 M/UL (ref 4.2–5.3)
RBC #/AREA URNS HPF: ABNORMAL /HPF (ref 0–5)
RBC MORPH BLD: ABNORMAL
SODIUM SERPL-SCNC: 137 MMOL/L (ref 136–145)
SP GR UR REFRACTOMETRY: 1.02 (ref 1–1.03)
TROPONIN I SERPL-MCNC: <0.02 NG/ML (ref 0–0.04)
UROBILINOGEN UR QL STRIP.AUTO: 1 EU/DL (ref 0.2–1)
VENTRICULAR RATE, ECG03: 75 BPM
WBC # BLD AUTO: 2.8 K/UL (ref 4.6–13.2)
WBC URNS QL MICRO: ABNORMAL /HPF (ref 0–5)

## 2020-10-11 PROCEDURE — 80307 DRUG TEST PRSMV CHEM ANLYZR: CPT

## 2020-10-11 PROCEDURE — 93005 ELECTROCARDIOGRAM TRACING: CPT

## 2020-10-11 PROCEDURE — 85025 COMPLETE CBC W/AUTO DIFF WBC: CPT

## 2020-10-11 PROCEDURE — 82550 ASSAY OF CK (CPK): CPT

## 2020-10-11 PROCEDURE — 81001 URINALYSIS AUTO W/SCOPE: CPT

## 2020-10-11 PROCEDURE — 80053 COMPREHEN METABOLIC PANEL: CPT

## 2020-10-11 PROCEDURE — 99285 EMERGENCY DEPT VISIT HI MDM: CPT

## 2020-10-12 NOTE — ED TRIAGE NOTES
Pt came in via EMS with accidental OD;  Pt denies any drug use although per EMS pt was found to be unresponsive and after Narcan 2mg IN pt woke up and became responsive;   Pt adamantly refuses drug use to nurse;  Pt A&O x' 4 at this time and denies any complaints;    FSBS via EMS was 126

## 2020-10-12 NOTE — ED PROVIDER NOTES
EMERGENCY DEPARTMENT HISTORY AND PHYSICAL EXAM    Date: 10/11/2020  Patient Name: Saul Davila    History of Presenting Illness     Chief Complaint   Patient presents with    Drug Overdose         History Provided By: Patient    2042  Saul Davila is a 72 y.o. female with PMHX of DM, hepatitis C, cirrhosis, former IV drug abuse who presents to the emergency department C/O syncope-like episode. Patient arrives by EMS after being found unresponsive at home. Per EMS she was given 2 mg of Narcan with restoration of mental status. In the emergency department patient is awake alert oriented x4 pleasant not in any sort distress she denies all complaints to me. She says that she does not take any opiates and says she is stop using heroin several years ago. She reports the only medication she takes is trazodone and she did not take any of that tonight. She denies alcohol use and drug use. Tells me that she was laying in bed with her  watching a football game when she fell asleep. The next thing she remembers is waking up with a bunch of people in the room. PCP: Lesia Huggins MD    Current Outpatient Medications   Medication Sig Dispense Refill    tiotropium bromide (Spiriva Respimat) 2.5 mcg/actuation inhaler Take 2 Puffs by inhalation daily.  fluticasone furoate-vilanteroL (Breo Ellipta) 100-25 mcg/dose inhaler Take 1 Puff by inhalation daily.  Omeprazole delayed release (PRILOSEC D/R) 20 mg tablet Take 20 mg by mouth two (2) times a day.  multivitamin with minerals (MULTIVITAMIN & MINERAL FORMULA PO) Take 1 Tab by mouth daily.  escitalopram oxalate (LEXAPRO) 20 mg tablet Take 10 mg by mouth daily.  methocarbamoL (ROBAXIN) 500 mg tablet Take 500 mg by mouth every six (6) hours as needed for Muscle Spasm(s).  cyclobenzaprine (FLEXERIL) 10 mg tablet Take 10 mg by mouth three (3) times daily as needed for Muscle Spasm(s).       traZODone (DESYREL) 100 mg tablet Take 100 mg by mouth nightly.  hydrOXYzine HCL (ATARAX) 50 mg tablet Take 50 mg by mouth two (2) times daily as needed for Anxiety.  predniSONE (STERAPRED) 5 mg dose pack See administration instruction per 5mg dose pack 21 Tab 0    fluticasone propion-salmeteroL (Advair Diskus) 250-50 mcg/dose diskus inhaler Take 1 Puff by inhalation every twelve (12) hours. 1 Inhaler 0    albuterol sulfate (PROAIR RESPICLICK) 90 mcg/actuation breath activated inhaler Take 1-2 Puffs by inhalation every four (4) hours as needed for Wheezing, Shortness of Breath, Respiratory Distress or Cough. 1 Inhaler 0    furosemide (Lasix) 20 mg tablet Take 0.5 Tabs by mouth daily. 3 Tab 0    prazosin (MINIPRESS) 2 mg capsule Take 2 mg by mouth nightly.  vit A/vit C/vit E/zinc/copper (PRESERVISION AREDS PO) Take 1 Cap by mouth two (2) times a day.  rifAXIMin (XIFAXAN) 550 mg tablet Take 550 mg by mouth two (2) times a day.  zinc 50 mg tab tablet Take 50 mg by mouth daily.  lurasidone (Latuda) 40 mg tab tablet Take 40 mg by mouth daily (with dinner).  pregabalin (Lyrica) 75 mg capsule Take 75 mg by mouth two (2) times a day.  levothyroxine (SYNTHROID) 50 mcg tablet Take 50 mcg by mouth Daily (before breakfast).  rOPINIRole (REQUIP) 0.25 mg tablet Take 2 mg by mouth nightly as needed. Indications: restless legs syndrome, an extreme discomfort in the calf muscles when sitting or lying down      spironolactone (ALDACTONE) 25 mg tablet Take  by mouth daily.          Past History     Past Medical History:  Past Medical History:   Diagnosis Date    Chronic back pain     Diabetes ([address]     [facility]     Hepatic cirrhosis [facility]     Hepatitis C     Heroin abuse ([facility]     Pain management     Restless leg syndrome     Sciatica     Spleen enlarged     Thyroid disease        Past Surgical History:  Past Surgical History:   Procedure Laterality Date    HX CHOLECYSTECTOMY  HX GYN      hysterectomy    HX HEENT      T&A    HX ORTHOPAEDIC      Bunion, left hand and right arm abscess removal       Family History:  History reviewed. No pertinent family history. Social History:  Social History     Tobacco Use    Smoking status: Current Every Day Smoker    Smokeless tobacco: Never Used   Substance Use Topics    Alcohol use: No    Drug use: Yes       Allergies: Allergies   Allergen Reactions    Erythromycin Hives    Penicillins Hives    Tetracycline Hives         Review of Systems   Review of Systems   Constitutional: Negative for chills and fever. Respiratory: Negative for shortness of breath. Cardiovascular: Negative for chest pain. Neurological: Negative for dizziness, facial asymmetry and headaches. All other systems reviewed and are negative. Physical Exam     Vitals:    10/11/20 2126 10/11/20 2128 10/11/20 2130 10/11/20 2200   BP: (!) 153/67 137/78 (!) 132/99 (!) 145/52   Pulse: 63 66 73 (!) 56   Resp:   20 14   Temp:       SpO2:   100% 100%   Weight:       Height:         Physical Exam  Vitals signs and nursing note reviewed. Constitutional:       General: She is not in acute distress. Appearance: Normal appearance. She is well-developed. She is not ill-appearing. HENT:      Head: Normocephalic and atraumatic. Eyes:      Extraocular Movements: Extraocular movements intact. Conjunctiva/sclera: Conjunctivae normal.      Pupils: Pupils are equal, round, and reactive to light. Comments: Pupils 2mm b/l     Neck:      Musculoskeletal: Normal range of motion and neck supple. Cardiovascular:      Rate and Rhythm: Normal rate and regular rhythm. Pulses: Normal pulses. Heart sounds: Normal heart sounds. Pulmonary:      Effort: Pulmonary effort is normal. No respiratory distress. Breath sounds: Normal breath sounds. No wheezing or rales. Chest:      Chest wall: No tenderness.    Abdominal:      General: There is no distension. Palpations: Abdomen is soft. Tenderness: There is no abdominal tenderness. There is no guarding or rebound. Musculoskeletal: Normal range of motion. General: No tenderness. Lymphadenopathy:      Cervical: No cervical adenopathy. Skin:     General: Skin is warm and dry. Neurological:      General: No focal deficit present. Mental Status: She is alert and oriented to person, place, and time. Cranial Nerves: No cranial nerve deficit. Motor: No abnormal muscle tone. Coordination: Coordination normal.   Psychiatric:         Mood and Affect: Mood normal.         Behavior: Behavior normal.               Diagnostic Study Results     Labs -     Recent Results (from the past 12 hour(s))   EKG, 12 LEAD, INITIAL    Collection Time: 10/11/20  8:07 PM   Result Value Ref Range    Ventricular Rate 75 BPM    Atrial Rate 75 BPM    P-R Interval 158 ms    QRS Duration 104 ms    Q-T Interval 408 ms    QTC Calculation (Bezet) 455 ms    Calculated P Axis 39 degrees    Calculated R Axis -17 degrees    Calculated T Axis 39 degrees    Diagnosis       Normal sinus rhythm  Normal ECG  When compared with ECG of 16-SEP-2020 05:27,  Vent. rate has increased BY  28 BPM  T wave amplitude has decreased in Anterior leads  Confirmed by Nusrat Gloria MD, -- ([id number]) on 10/11/2020 9:32:56 PM     CBC WITH AUTOMATED DIFF    Collection Time: 10/11/20  8:29 PM   Result Value Ref Range    WBC 2.8 (L) 4.6 - 13.2 K/uL    RBC 4.56 4.20 - 5.30 M/uL    HGB 12.4 12.0 - 16.0 g/dL    HCT 38.5 35.0 - 45.0 %    MCV 84.4 74.0 - 97.0 FL    MCH 27.2 24.0 - 34.0 PG    MCHC 32.2 31.0 - 37.0 g/dL    RDW 14.8 (H) 11.6 - 14.5 %    PLATELET 59 (L) 721 - 420 K/uL    MPV 11.1 9.2 - 11.8 FL    NEUTROPHILS 54 40 - 73 %    LYMPHOCYTES 36 21 - 52 %    MONOCYTES 9 3 - 10 %    EOSINOPHILS 1 0 - 5 %    BASOPHILS 0 0 - 2 %    ABS. NEUTROPHILS 1.5 (L) 1.8 - 8.0 K/UL    ABS. LYMPHOCYTES 1.0 0.9 - 3.6 K/UL    ABS.  MONOCYTES 0.3 0.05 - 1.2 K/UL    ABS. EOSINOPHILS 0.0 0.0 - 0.4 K/UL    ABS. BASOPHILS 0.0 0.0 - 0.1 K/UL    PLATELET COMMENTS Platelet Estimate, Decreased      RBC COMMENTS ANISOCYTOSIS  1+        DF AUTOMATED     METABOLIC PANEL, COMPREHENSIVE    Collection Time: 10/11/20  8:29 PM   Result Value Ref Range    Sodium 137 136 - 145 mmol/L    Potassium 4.9 3.5 - 5.5 mmol/L    Chloride 100 100 - 111 mmol/L    CO2 29 21 - 32 mmol/L    Anion gap 8 3.0 - 18 mmol/L    Glucose 105 (H) 74 - 99 mg/dL    BUN 24 (H) 7.0 - 18 MG/DL    Creatinine 0.88 0.6 - 1.3 MG/DL    BUN/Creatinine ratio 27 (H) 12 - 20      GFR est AA >60 >60 ml/min/1.73m2    GFR est non-AA >60 >60 ml/min/1.73m2    Calcium 9.6 8.5 - 10.1 MG/DL    Bilirubin, total 0.5 0.2 - 1.0 MG/DL    ALT (SGPT) 21 13 - 56 U/L    AST (SGOT) 31 10 - 38 U/L    Alk.  phosphatase 74 45 - 117 U/L    Protein, total 6.8 6.4 - 8.2 g/dL    Albumin 3.6 3.4 - 5.0 g/dL    Globulin 3.2 2.0 - 4.0 g/dL    A-G Ratio 1.1 0.8 - 1.7     CARDIAC PANEL,(CK, CKMB & TROPONIN)    Collection Time: 10/11/20  8:29 PM   Result Value Ref Range    CK - MB 1.6 <3.6 ng/ml    CK-MB Index 2.3 0.0 - 4.0 %    CK 71 26 - 192 U/L    Troponin-I, QT <0.02 0.0 - 0.045 NG/ML   URINALYSIS W/ RFLX MICROSCOPIC    Collection Time: 10/11/20  9:19 PM   Result Value Ref Range    Color YELLOW      Appearance CLEAR      Specific gravity 1.021 1.005 - 1.030      pH (UA) 7.0 5.0 - 8.0      Protein TRACE (A) NEG mg/dL    Glucose Negative NEG mg/dL    Ketone Negative NEG mg/dL    Bilirubin Negative NEG      Blood Negative NEG      Urobilinogen 1.0 0.2 - 1.0 EU/dL    Nitrites Negative NEG      Leukocyte Esterase Negative NEG     DRUG SCREEN, URINE    Collection Time: 10/11/20  9:19 PM   Result Value Ref Range    BENZODIAZEPINES Negative NEG      BARBITURATES Negative NEG      THC (TH-CANNABINOL) Positive (A) NEG      OPIATES Negative NEG      PCP(PHENCYCLIDINE) Negative NEG      COCAINE Negative NEG      AMPHETAMINES Negative NEG      METHADONE Negative NEG      HDSCOM (NOTE)    URINE MICROSCOPIC ONLY    Collection Time: 10/11/20  9:19 PM   Result Value Ref Range    WBC 0 to 3 0 - 5 /hpf    RBC 0 to 3 0 - 5 /hpf    Epithelial cells 3+ 0 - 5 /lpf    Bacteria FEW (A) NEG /hpf    Hyaline cast 21 to 30 0 - 2 /lpf       Radiologic Studies -   No orders to display     CT Results  (Last 48 hours)    None        CXR Results  (Last 48 hours)    None          Medications given in the ED-  Medications - No data to display      Medical Decision Making   I am the first provider for this patient. I reviewed the vital signs, available nursing notes, past medical history, past surgical history, family history and social history. Vital Signs-Reviewed the patient's vital signs. Pulse Oximetry Analysis and Interpretation:   98% on RA, normal        Records Reviewed: Nursing Notes and Old Medical Records    Provider Notes (Medical Decision Making): Kacy Malone is a 72 y.o. female presents here after reported drug overdose. Patient denies all drug and substance use to me however did have improvement of mental status and responsiveness after receiving Narcan in the field. She has no signs of withdrawal nausea vomiting so possible that she had a syncopal-like event and not an overdose. Have ordered a drug screen and will also send some screening labs, orthostatics. EKG looks normal.    Procedures:  Procedures    ED Course:   Remains symptom-free in emergency department. Blood Work demonstrates stable leukopenia, chemistry normal, drug screen positive for THC. Patient requesting and is agreeable with discharge. Diagnosis and Disposition     Critical Care:     DISCHARGE NOTE:    Kacy Malone  results have been reviewed with her. She has been counseled regarding her diagnosis, treatment, and plan.   She verbally conveys understanding and agreement of the signs, symptoms, diagnosis, treatment and prognosis and additionally agrees to follow up as discussed. She also agrees with the care-plan and conveys that all of her questions have been answered. I have also provided discharge instructions for her that include: educational information regarding their diagnosis and treatment, and list of reasons why they would want to return to the ED prior to their follow-up appointment, should her condition change. She has been provided with education for proper emergency department utilization. CLINICAL IMPRESSION:    1. Episode of unresponsiveness        PLAN:  1. D/C Home  2. Discharge Medication List as of 10/11/2020 10:36 PM        3. Follow-up Information     Follow up With Specialties Details Why Contact Info    Alona Bautista MD Internal Medicine Schedule an appointment as soon as possible for a visit  For primary care follow up Perla French [address] [place] [address]      Fredis Olivas MD Internal Medicine Schedule an appointment as soon as possible for a visit  For primary care follow up Perla French [address] [place] [address]  [phone]          _______________________________      Please note that this dictation was completed with [employer], the computer voice recognition software. Quite often unanticipated grammatical, syntax, homophones, and other interpretive errors are inadvertently transcribed by the computer software. Please disregard these errors. Please excuse any errors that have escaped final proofreading.

## 2020-10-12 NOTE — ED NOTES
Pt given written and verbal d/c instructions. Pt alert and oriented. Ambulated from ED w/o difficulty. Pt's  here to take her home.

## 2021-06-27 ENCOUNTER — APPOINTMENT (OUTPATIENT)
Dept: GENERAL RADIOLOGY | Age: 71
End: 2021-06-27
Attending: EMERGENCY MEDICINE
Payer: MEDICARE

## 2021-06-27 ENCOUNTER — HOSPITAL ENCOUNTER (EMERGENCY)
Age: 71
Discharge: HOME | End: 2021-06-27
Attending: EMERGENCY MEDICINE
Payer: MEDICARE

## 2021-06-27 VITALS
HEIGHT: 61 IN | BODY MASS INDEX: 31.15 KG/M2 | TEMPERATURE: 98 F | SYSTOLIC BLOOD PRESSURE: 105 MMHG | HEART RATE: 61 BPM | OXYGEN SATURATION: 96 % | RESPIRATION RATE: 16 BRPM | DIASTOLIC BLOOD PRESSURE: 63 MMHG | WEIGHT: 165 LBS

## 2021-06-27 DIAGNOSIS — M25.521 PAIN, JOINT, UPPER ARM, RIGHT: ICD-10-CM

## 2021-06-27 DIAGNOSIS — S92.901A MULTIPLE CLOSED FRACTURES OF RIGHT FOOT, INITIAL ENCOUNTER: Primary | ICD-10-CM

## 2021-06-27 DIAGNOSIS — S83.91XA SPRAIN OF RIGHT KNEE, UNSPECIFIED LIGAMENT, INITIAL ENCOUNTER: ICD-10-CM

## 2021-06-27 DIAGNOSIS — S93.401A SPRAIN OF RIGHT ANKLE, UNSPECIFIED LIGAMENT, INITIAL ENCOUNTER: ICD-10-CM

## 2021-06-27 DIAGNOSIS — M17.11 ARTHRITIS OF KNEE, RIGHT: ICD-10-CM

## 2021-06-27 PROCEDURE — 73560 X-RAY EXAM OF KNEE 1 OR 2: CPT

## 2021-06-27 PROCEDURE — 99283 EMERGENCY DEPT VISIT LOW MDM: CPT

## 2021-06-27 PROCEDURE — 73590 X-RAY EXAM OF LOWER LEG: CPT

## 2021-06-27 PROCEDURE — 73630 X-RAY EXAM OF FOOT: CPT

## 2021-06-27 PROCEDURE — 73610 X-RAY EXAM OF ANKLE: CPT

## 2021-06-27 PROCEDURE — 73060 X-RAY EXAM OF HUMERUS: CPT

## 2021-06-27 NOTE — ED TRIAGE NOTES
Pt states \" I tripped and fell saturday and now I have terrible ankle pain to the right foot. \" Pt's ankle swollen and toes bruised. Pt states \" I have fallen a lot in the last few days about 5-6 times, i'm not sure if I passed out or not. \"

## 2021-06-27 NOTE — ED NOTES
Primary RN reviewed discharge instructions with the patient. The patient verbalized understanding. No patient care given by this RN, removed from tracking board only.

## 2021-06-27 NOTE — ED PROVIDER NOTES
EMERGENCY DEPARTMENT HISTORY AND PHYSICAL EXAM    Date: 6/27/2021  Patient Name: Tony Pitt    History of Presenting Illness     Chief Complaint   Patient presents with    Ankle Injury    Fall         History Provided By: Patient    4:07 PM  Tony Pitt is a 72 y.o. female with PMHX of diabetes, hypothyroidism, COPD, fibromyalgia, heroin use who presents to the emergency department with her niece C/O right lower leg pain from the knee down and right upper arm pain status post fall 3 days ago. Patient states she was getting up from the couch and fell, unsure if she lost consciousness and injured her leg in the fall. She had another fall yesterday, where she tripped and fell while crossing the street. Is followed at the [address] [place] [address], states had normal blood work 1 week ago. She does admit to frequent heroin use, +sniffs heroin. She says she lives alone. She declines substance use help her detox today though she and her niece have discussed it; she states she has detox at the [address] [place] [address] in the past. Her  passed away recently and she does not want to leave her cats to be cared for by other family members. +cigarette smoker, occasional ETOH. Pt denies chest pain, shortness of breath, head injury, headache, neck pain, back pain, abdominal pain, vomiting, left leg pain, hip pain, and any other sxs or complaints. PCP: Ronnell Monahan MD    Current Outpatient Medications   Medication Sig Dispense Refill    tiotropium bromide (Spiriva Respimat) 2.5 mcg/actuation inhaler Take 2 Puffs by inhalation daily.  fluticasone furoate-vilanteroL (Breo Ellipta) 100-25 mcg/dose inhaler Take 1 Puff by inhalation daily.  multivitamin with minerals (MULTIVITAMIN & MINERAL FORMULA PO) Take 1 Tab by mouth daily.  escitalopram oxalate (LEXAPRO) 20 mg tablet Take 10 mg by mouth daily.  traZODone (DESYREL) 100 mg tablet Take 100 mg by mouth nightly.       fluticasone propion-salmeteroL (Advair Diskus) 250-50 mcg/dose diskus inhaler Take 1 Puff by inhalation every twelve (12) hours. 1 Inhaler 0    albuterol sulfate (PROAIR RESPICLICK) 90 mcg/actuation breath activated inhaler Take 1-2 Puffs by inhalation every four (4) hours as needed for Wheezing, Shortness of Breath, Respiratory Distress or Cough. 1 Inhaler 0    vit A/vit C/vit E/zinc/copper (PRESERVISION AREDS PO) Take 1 Cap by mouth two (2) times a day.  rifAXIMin (XIFAXAN) 550 mg tablet Take 550 mg by mouth two (2) times a day.  zinc 50 mg tab tablet Take 50 mg by mouth daily.  lurasidone (Latuda) 40 mg tab tablet Take 40 mg by mouth daily (with dinner).  pregabalin (Lyrica) 75 mg capsule Take 75 mg by mouth two (2) times a day.  levothyroxine (SYNTHROID) 50 mcg tablet Take 50 mcg by mouth Daily (before breakfast).  rOPINIRole (REQUIP) 0.25 mg tablet Take 2 mg by mouth nightly as needed. Indications: restless legs syndrome, an extreme discomfort in the calf muscles when sitting or lying down      spironolactone (ALDACTONE) 25 mg tablet Take  by mouth daily.  Omeprazole delayed release (PRILOSEC D/R) 20 mg tablet Take 20 mg by mouth two (2) times a day. (Patient not taking: Reported on 6/27/2021)      methocarbamoL (ROBAXIN) 500 mg tablet Take 500 mg by mouth every six (6) hours as needed for Muscle Spasm(s).  cyclobenzaprine (FLEXERIL) 10 mg tablet Take 10 mg by mouth three (3) times daily as needed for Muscle Spasm(s).  hydrOXYzine HCL (ATARAX) 50 mg tablet Take 50 mg by mouth two (2) times daily as needed for Anxiety. (Patient not taking: Reported on 6/27/2021)      predniSONE (STERAPRED) 5 mg dose pack See administration instruction per 5mg dose pack (Patient not taking: Reported on 6/27/2021) 21 Tab 0    furosemide (Lasix) 20 mg tablet Take 0.5 Tabs by mouth daily. 3 Tab 0    prazosin (MINIPRESS) 2 mg capsule Take 2 mg by mouth nightly.          Past History     Past Medical History:  Past Medical History:   Diagnosis Date    Chronic back pain     Diabetes ([facility]     [facility]     Hepatic cirrhosis (HCC)     Hepatitis C     [facility]     Pain management     Restless leg syndrome     Sciatica     Spleen enlarged     Thyroid disease        Past Surgical History:  Past Surgical History:   Procedure Laterality Date    HX CHOLECYSTECTOMY      HX GYN      hysterectomy    HX HEENT      T&A    HX ORTHOPAEDIC      Bunion, left hand and right arm abscess removal       Family History:  History reviewed. No pertinent family history. Social History:  Social History     Tobacco Use    Smoking status: Current Every Day Smoker    Smokeless tobacco: Never Used   Substance Use Topics    Alcohol use: No    Drug use: Yes       Allergies: Allergies   Allergen Reactions    Erythromycin Hives    Penicillins Hives    Tetracycline Hives         Review of Systems   Review of Systems   Constitutional: Negative for fever. Respiratory: Negative for shortness of breath. Cardiovascular: Negative for chest pain. Musculoskeletal: Positive for arthralgias, joint swelling and myalgias. Negative for back pain and neck pain. Skin: Negative. Neurological: Positive for syncope (possible). Negative for headaches. All other systems reviewed and are negative. Physical Exam     Vitals:    06/27/21 1603   BP: 105/63   Pulse: 61   Resp: 16   Temp: 98 °F (36.7 °C)   SpO2: 96%   Weight: 74.8 kg (165 lb)   Height: 5' 1\" (1.549 m)     Physical Exam  Vital signs and nursing notes reviewed. CONSTITUTIONAL: Alert. Well-appearing; well-nourished; in no apparent distress. HEAD: Normocephalic; atraumatic. CV: Normal S1, S2; no murmurs, rubs, or gallops. RESPIRATORY: Normal chest excursion with respiration; breath sounds clear and equal bilaterally; no wheezes, rhonchi, or rales. BACK:  No evidence of trauma or deformity. Non-tender to palpation.  FROM without difficulty. EXT: RUE: Mild generalized tenderness to right upper arm and shoulder without swelling, erythema, ecchymosis or deformity. Slight decreased range of motion of shoulder. Elbow forearm wrist and hand nontender; states intact. 2+ radial pulse. LUE: Nontender/atraumatic/FROM. RLE: Hip and thigh nontender. Mild knee tenderness without deformity, generalized tenderness to the lower leg, particularly ankle with moderate diffuse swelling, no erythema or wounds. Some scattered ecchymosis around the toes and lateral aspect of the foot but toes nontender. Sensation intact. 2+ DP/PT pulse. LLE: nontender/atraumatic. SKIN: Normal for age and race; warm; dry; good turgor; no apparent lesions or exudate. NEURO: A & O x3. PSYCH:  Mood and affect appropriate. Diagnostic Study Results     Labs -   No results found for this or any previous visit (from the past 12 hour(s)). Radiologic Studies -   XR ANKLE RT MIN 3 V   Final Result   Small avulsion fracture at the tip of the fifth metatarsal and along   the dorsal aspect of the navicular. Unstable ankle joint with widening of the ankle mortise medially      Soft tissue swelling about the ankle and dorsum of the foot. .      XR KNEE RT MAX 2 VWS   Final Result   No acute fractures or subluxation of right knee. CPPD      Moderate arthritis possibly osteoarthritis or related to CPPD. Small suprapatellar joint effusion. XR TIB/FIB RT   Final Result   No acute fractures or subluxation of right tibia and fibula. Soft   tissue swelling about the ankle with an unstable ankle joint and widening of the   ankle mortise medially. XR FOOT RT MIN 3 V   Final Result      There is suggestion of subluxation the second and third MTP joints correlate   clinically. There are degenerative changes of first MTP joint with mild hallux   valgus deformity.       Small bony densities seen along the dorsum of the navicular, base of the fifth   metatarsal and at the level of the tibiotalar joint suggesting small fractures   age-indeterminate. I suspect the fractures of the pelvis tibiotalar joint and   navicular are old. There is an old small avulsion fracture off of the lateral   aspect of the cuboid. Soft tissue swelling of the foot and ankle. XR HUMERUS RT   Final Result   No acute fractures or subluxation of right humerus. CT Results  (Last 48 hours)    None        CXR Results  (Last 48 hours)    None          Medications given in the ED-  Medications - No data to display      Medical Decision Making   I am the first provider for this patient. I reviewed the vital signs, available nursing notes, past medical history, past surgical history, family history and social history. Vital Signs-Reviewed the patient's vital signs. Records Reviewed: Nursing Notes      Procedures:  Procedures    ED Course:  4:07 PM   Initial assessment performed. The patients presenting problems have been discussed, and they are in agreement with the care plan formulated and outlined with them. I have encouraged them to ask questions as they arise throughout their visit. Provider Notes (Medical Decision Making): Gurwinder Cordoba is a 72 y.o. female presents status post fall 3 days ago complaining of right lower leg and right upper arm pain. She also reports possible syncope with a fall 3 days ago but is unsure. She does admit to frequent heroin use by sniffing, currently does not appear intoxicated and is alert and oriented; declines referral or transfer for detox, stating she can go to the [facility] at any time for that but wants to sort the timing out with her family to care for her cats. X-ray right foot ankle shows ankle sprain, several tiny avulsion fractures, some age-indeterminate at the medial malleolus, base of fifth metatarsal and tibiotalar joint, + subluxation of second and third MTP joints.   She does have pain here but is tender over pretty much entire foot ankle and lower leg and she has similar chronic deformities of toes on the other foot; right humerus XR no acute process. Patient states she would not be able to use crutches, has a walker at home. She is NV intact, agreeable to walking boot, walker and urgent follow-up with orthopedist, patient to call tomorrow. Reguarding her heroin use, patient with a niece at bedside plan to seek detox or help, are aware of resources with the [facility] [place] and they refuse any assistance/placement with this today. Diagnosis and Disposition       DISCHARGE NOTE:    Cory Bear  results have been reviewed with her. She has been counseled regarding her diagnosis, treatment, and plan. She verbally conveys understanding and agreement of the signs, symptoms, diagnosis, treatment and prognosis and additionally agrees to follow up as discussed. She also agrees with the care-plan and conveys that all of her questions have been answered. I have also provided discharge instructions for her that include: educational information regarding their diagnosis and treatment, and list of reasons why they would want to return to the ED prior to their follow-up appointment, should her condition change. She has been provided with education for proper emergency department utilization. CLINICAL IMPRESSION:    1. Multiple closed fractures of right foot, initial encounter    2. Sprain of right ankle, unspecified ligament, initial encounter    3. Sprain of right knee, unspecified ligament, initial encounter    4. Arthritis of knee, right    5. Pain, joint, upper arm, right        PLAN:  1. D/C Home  2. Current Discharge Medication List        3. Follow-up Information     Follow up With Specialties Details Why Contact Info    Chel Amaya MD Orthopedic Surgery Schedule an appointment as soon as possible for a visit  Call tomorrow to be seen as soon as possible.  [address] [facility]  Orthopedic and Spine [address] [id number] [phone]      [facility] EMERGENCY DEPT Emergency Medicine  As needed, If symptoms worsen [address] [place] [address]  [phone]        _______________________________      Please note that this dictation was completed with [employer], the computer voice recognition software. Quite often unanticipated grammatical, syntax, homophones, and other interpretive errors are inadvertently transcribed by the computer software. Please disregard these errors. Please excuse any errors that have escaped final proofreading.

## 2021-11-09 ENCOUNTER — HOSPITAL ENCOUNTER (EMERGENCY)
Age: 71
Discharge: HOME | End: 2021-11-09
Attending: EMERGENCY MEDICINE
Payer: MEDICARE

## 2021-11-09 VITALS
DIASTOLIC BLOOD PRESSURE: 49 MMHG | HEART RATE: 56 BPM | SYSTOLIC BLOOD PRESSURE: 116 MMHG | HEIGHT: 61 IN | OXYGEN SATURATION: 97 % | TEMPERATURE: 98.5 F | BODY MASS INDEX: 29.27 KG/M2 | RESPIRATION RATE: 15 BRPM | WEIGHT: 155 LBS

## 2021-11-09 DIAGNOSIS — T40.1X1A ACCIDENTAL OVERDOSE OF HEROIN, INITIAL ENCOUNTER (HCC): Primary | ICD-10-CM

## 2021-11-09 LAB
ALBUMIN SERPL-MCNC: 3.7 G/DL (ref 3.4–5)
ALBUMIN/GLOB SERPL: 1.2 {RATIO} (ref 0.8–1.7)
ALP SERPL-CCNC: 70 U/L (ref 45–117)
ALT SERPL-CCNC: 24 U/L (ref 13–56)
ANION GAP SERPL CALC-SCNC: 5 MMOL/L (ref 3–18)
AST SERPL-CCNC: 30 U/L (ref 10–38)
ATRIAL RATE: 67 BPM
BASOPHILS # BLD: 0 K/UL (ref 0–0.1)
BASOPHILS NFR BLD: 1 % (ref 0–2)
BILIRUB SERPL-MCNC: 0.9 MG/DL (ref 0.2–1)
BUN SERPL-MCNC: 26 MG/DL (ref 7–18)
BUN/CREAT SERPL: 25 (ref 12–20)
CALCIUM SERPL-MCNC: 9.3 MG/DL (ref 8.5–10.1)
CALCULATED P AXIS, ECG09: 57 DEGREES
CALCULATED R AXIS, ECG10: -24 DEGREES
CALCULATED T AXIS, ECG11: 46 DEGREES
CHLORIDE SERPL-SCNC: 106 MMOL/L (ref 100–111)
CO2 SERPL-SCNC: 27 MMOL/L (ref 21–32)
CREAT SERPL-MCNC: 1.03 MG/DL (ref 0.6–1.3)
DIAGNOSIS, 93000: NORMAL
DIFFERENTIAL METHOD BLD: ABNORMAL
EOSINOPHIL # BLD: 0.1 K/UL (ref 0–0.4)
EOSINOPHIL NFR BLD: 2 % (ref 0–5)
ERYTHROCYTE [DISTWIDTH] IN BLOOD BY AUTOMATED COUNT: 14 % (ref 11.6–14.5)
ETHANOL SERPL-MCNC: <3 MG/DL (ref 0–3)
GLOBULIN SER CALC-MCNC: 3.1 G/DL (ref 2–4)
GLUCOSE SERPL-MCNC: 153 MG/DL (ref 74–99)
HCT VFR BLD AUTO: 40.2 % (ref 35–45)
HGB BLD-MCNC: 13.2 G/DL (ref 12–16)
LYMPHOCYTES # BLD: 0.8 K/UL (ref 0.9–3.6)
LYMPHOCYTES NFR BLD: 26 % (ref 21–52)
MCH RBC QN AUTO: 27.2 PG (ref 24–34)
MCHC RBC AUTO-ENTMCNC: 32.8 G/DL (ref 31–37)
MCV RBC AUTO: 82.7 FL (ref 78–100)
MONOCYTES # BLD: 0.2 K/UL (ref 0.05–1.2)
MONOCYTES NFR BLD: 7 % (ref 3–10)
NEUTS SEG # BLD: 2.1 K/UL (ref 1.8–8)
NEUTS SEG NFR BLD: 64 % (ref 40–73)
P-R INTERVAL, ECG05: 162 MS
PLATELET # BLD AUTO: 65 K/UL (ref 135–420)
PLATELET COMMENTS,PCOM: ABNORMAL
PMV BLD AUTO: 10.9 FL (ref 9.2–11.8)
POTASSIUM SERPL-SCNC: 3.8 MMOL/L (ref 3.5–5.5)
PROT SERPL-MCNC: 6.8 G/DL (ref 6.4–8.2)
Q-T INTERVAL, ECG07: 428 MS
QRS DURATION, ECG06: 88 MS
QTC CALCULATION (BEZET), ECG08: 452 MS
RBC # BLD AUTO: 4.86 M/UL (ref 4.2–5.3)
RBC MORPH BLD: ABNORMAL
SODIUM SERPL-SCNC: 138 MMOL/L (ref 136–145)
VENTRICULAR RATE, ECG03: 67 BPM
WBC # BLD AUTO: 3.2 K/UL (ref 4.6–13.2)

## 2021-11-09 PROCEDURE — 82077 ASSAY SPEC XCP UR&BREATH IA: CPT

## 2021-11-09 PROCEDURE — 85025 COMPLETE CBC W/AUTO DIFF WBC: CPT

## 2021-11-09 PROCEDURE — 93005 ELECTROCARDIOGRAM TRACING: CPT

## 2021-11-09 PROCEDURE — 99285 EMERGENCY DEPT VISIT HI MDM: CPT

## 2021-11-09 PROCEDURE — 80053 COMPREHEN METABOLIC PANEL: CPT

## 2021-11-09 RX ORDER — NALOXONE HYDROCHLORIDE 4 MG/.1ML
SPRAY NASAL
Qty: 2 EACH | Refills: 0 | Status: SHIPPED | OUTPATIENT
Start: 2021-11-09

## 2021-11-09 NOTE — ED TRIAGE NOTES
Pt was found altered by a neighbor when her front door was found open. EMS administered 2MG of intranasal Naloxone. PT has no complaints, but admits to snorting heroin and smoking marijuana. VSS for EMS. 20GA LEFT hand.

## 2021-11-09 NOTE — ED NOTES
Pt is aaox4. Blood sent to lab ekg done. Pt in room asking for water, mouth is dry. Pt back to baseline at this time.

## 2021-11-09 NOTE — ED PROVIDER NOTES
EMERGENCY DEPARTMENT HISTORY AND PHYSICAL EXAM    Date: 11/9/2021  Patient Name: Alexandra Schofield    History of Presenting Illness     Chief Complaint   Patient presents with    Drug Problem         History Provided By: Patient and EMS    10:55 AM  Alexandra Schofield is a 80 y.o. female with PMHX of RA disease, chronic pain, fibromyalgia, heroin abuse who presents to the emergency department C/O unresponsive. Per EMS they were called by a neighbor who noticed the patient's front door was open. They arrived and found patient unresponsive. They administered 2 mg of Narcan and patient arouse. She reports that she did snort some heroin today from a new dealer. She states she also smokes marijuana but denies any other substance use. She states she has her normal chronic pain at this time but denies any new symptoms including fever, chest pain, shortness of breath abdominal pain, nausea, vomiting, headache, other complaints. No other relieving or exacerbating factors identified. Denies that she was attempting to hurt herself or any suicidal ideation. PCP: Oskar Person MD    Current Outpatient Medications   Medication Sig Dispense Refill    naloxone (Narcan) 4 mg/actuation nasal spray Use 1 spray intranasally, then discard. Repeat with new spray every 2 min as needed for opioid overdose symptoms, alternating nostrils. 2 Each 0    tiotropium bromide (Spiriva Respimat) 2.5 mcg/actuation inhaler Take 2 Puffs by inhalation daily.  fluticasone furoate-vilanteroL (Breo Ellipta) 100-25 mcg/dose inhaler Take 1 Puff by inhalation daily.  Omeprazole delayed release (PRILOSEC D/R) 20 mg tablet Take 20 mg by mouth two (2) times a day. (Patient not taking: Reported on 6/27/2021)      multivitamin with minerals (MULTIVITAMIN & MINERAL FORMULA PO) Take 1 Tab by mouth daily.  escitalopram oxalate (LEXAPRO) 20 mg tablet Take 10 mg by mouth daily.       methocarbamoL (ROBAXIN) 500 mg tablet Take 500 mg by mouth every six (6) hours as needed for Muscle Spasm(s).  cyclobenzaprine (FLEXERIL) 10 mg tablet Take 10 mg by mouth three (3) times daily as needed for Muscle Spasm(s).  traZODone (DESYREL) 100 mg tablet Take 100 mg by mouth nightly.  hydrOXYzine HCL (ATARAX) 50 mg tablet Take 50 mg by mouth two (2) times daily as needed for Anxiety. (Patient not taking: Reported on 6/27/2021)      predniSONE (STERAPRED) 5 mg dose pack See administration instruction per 5mg dose pack (Patient not taking: Reported on 6/27/2021) 21 Tab 0    fluticasone propion-salmeteroL (Advair Diskus) 250-50 mcg/dose diskus inhaler Take 1 Puff by inhalation every twelve (12) hours. 1 Inhaler 0    albuterol sulfate (PROAIR RESPICLICK) 90 mcg/actuation breath activated inhaler Take 1-2 Puffs by inhalation every four (4) hours as needed for Wheezing, Shortness of Breath, Respiratory Distress or Cough. 1 Inhaler 0    furosemide (Lasix) 20 mg tablet Take 0.5 Tabs by mouth daily. 3 Tab 0    prazosin (MINIPRESS) 2 mg capsule Take 2 mg by mouth nightly.  vit A/vit C/vit E/zinc/copper (PRESERVISION AREDS PO) Take 1 Cap by mouth two (2) times a day.  rifAXIMin (XIFAXAN) 550 mg tablet Take 550 mg by mouth two (2) times a day.  zinc 50 mg tab tablet Take 50 mg by mouth daily.  lurasidone (Latuda) 40 mg tab tablet Take 40 mg by mouth daily (with dinner).  pregabalin (Lyrica) 75 mg capsule Take 75 mg by mouth two (2) times a day.  levothyroxine (SYNTHROID) 50 mcg tablet Take 50 mcg by mouth Daily (before breakfast).  rOPINIRole (REQUIP) 0.25 mg tablet Take 2 mg by mouth nightly as needed. Indications: restless legs syndrome, an extreme discomfort in the calf muscles when sitting or lying down      spironolactone (ALDACTONE) 25 mg tablet Take  by mouth daily.          Past History       Past Medical History:  Past Medical History:   Diagnosis Date    Chronic back pain     Diabetes ([facility] [address]     Drug abuse ([facility]     Hepatic cirrhosis ([facility]     Hepatitis C     Heroin abuse ([facility]     Pain management     Restless leg syndrome     Sciatica     Spleen enlarged     Thyroid disease        Past Surgical History:  Past Surgical History:   Procedure Laterality Date    HX CHOLECYSTECTOMY      HX GYN      hysterectomy    HX HEENT      T&A    HX ORTHOPAEDIC      Bunion, left hand and right arm abscess removal       Family History:  No family history on file. Social History:  Social History     Tobacco Use    Smoking status: Current Every Day Smoker    Smokeless tobacco: Never Used   Substance Use Topics    Alcohol use: No    Drug use: Yes       Allergies: Allergies   Allergen Reactions    Erythromycin Hives    Penicillins Hives    Tetracycline Hives         Review of Systems   Review of Systems   Constitutional: Negative for fever. Respiratory: Negative for shortness of breath. Cardiovascular: Negative for chest pain. Musculoskeletal: Positive for arthralgias and myalgias. All other systems reviewed and are negative.         Physical Exam     Vitals:    11/09/21 1051 11/09/21 1052   BP:  (!) 146/121   Resp: 16    Temp: 98.5 °F (36.9 °C)    SpO2: 95%    Weight: 70.3 kg (155 lb)    Height: 5' 1\" (1.549 m)      Physical Exam    Nursing notes and vital signs reviewed    Constitutional: Non toxic appearing, moderate distress  Head: Normocephalic, Atraumatic  Eyes: EOMI  Neck: Supple  Cardiovascular: Regular rate and rhythm, no murmurs, rubs, or gallops  Chest: Normal work of breathing and chest excursion bilaterally  Lungs: Clear to ausculation bilaterally  Abdomen: Soft, non tender, non distended  Back: No evidence of trauma or deformity  Extremities: No evidence of trauma or deformity, no LE edema  Skin: Warm and dry, normal cap refill  Neuro: Alert and appropriate, CN intact, normal speech, strength and sensation full and symmetric bilaterally, normal coordination  Psychiatric: Normal mood and affect      Diagnostic Study Results     Labs -     Recent Results (from the past 12 hour(s))   CBC WITH AUTOMATED DIFF    Collection Time: 11/09/21 11:08 AM   Result Value Ref Range    WBC 3.2 (L) 4.6 - 13.2 K/uL    RBC 4.86 4.20 - 5.30 M/uL    HGB 13.2 12.0 - 16.0 g/dL    HCT 40.2 35.0 - 45.0 %    MCV 82.7 78.0 - 100.0 FL    MCH 27.2 24.0 - 34.0 PG    MCHC 32.8 31.0 - 37.0 g/dL    RDW 14.0 11.6 - 14.5 %    PLATELET 65 (L) 478 - 420 K/uL    MPV 10.9 9.2 - 11.8 FL    NEUTROPHILS 64 40 - 73 %    LYMPHOCYTES 26 21 - 52 %    MONOCYTES 7 3 - 10 %    EOSINOPHILS 2 0 - 5 %    BASOPHILS 1 0 - 2 %    ABS. NEUTROPHILS 2.1 1.8 - 8.0 K/UL    ABS. LYMPHOCYTES 0.8 (L) 0.9 - 3.6 K/UL    ABS. MONOCYTES 0.2 0.05 - 1.2 K/UL    ABS. EOSINOPHILS 0.1 0.0 - 0.4 K/UL    ABS. BASOPHILS 0.0 0.0 - 0.1 K/UL    DF AUTOMATED      PLATELET COMMENTS DECREASED PLATELETS      RBC COMMENTS OVALOCYTES  1+       METABOLIC PANEL, COMPREHENSIVE    Collection Time: 11/09/21 11:08 AM   Result Value Ref Range    Sodium 138 136 - 145 mmol/L    Potassium 3.8 3.5 - 5.5 mmol/L    Chloride 106 100 - 111 mmol/L    CO2 27 21 - 32 mmol/L    Anion gap 5 3.0 - 18 mmol/L    Glucose 153 (H) 74 - 99 mg/dL    BUN 26 (H) 7.0 - 18 MG/DL    Creatinine 1.03 0.6 - 1.3 MG/DL    BUN/Creatinine ratio 25 (H) 12 - 20      GFR est AA >60 >60 ml/min/1.73m2    GFR est non-AA 53 (L) >60 ml/min/1.73m2    Calcium 9.3 8.5 - 10.1 MG/DL    Bilirubin, total 0.9 0.2 - 1.0 MG/DL    ALT (SGPT) 24 13 - 56 U/L    AST (SGOT) 30 10 - 38 U/L    Alk.  phosphatase 70 45 - 117 U/L    Protein, total 6.8 6.4 - 8.2 g/dL    Albumin 3.7 3.4 - 5.0 g/dL    Globulin 3.1 2.0 - 4.0 g/dL    A-G Ratio 1.2 0.8 - 1.7     ETHYL ALCOHOL    Collection Time: 11/09/21 11:08 AM   Result Value Ref Range    ALCOHOL(ETHYL),SERUM <3 0 - 3 MG/DL       Radiologic Studies -   No orders to display     CT Results  (Last 48 hours)    None        CXR Results  (Last 48 hours)    None          Medications given in the ED-  Medications - No data to display      Medical Decision Making   I am the first provider for this patient. I reviewed the vital signs, available nursing notes, past medical history, past surgical history, family history and social history. Vital Signs-Reviewed the patient's vital signs. Pulse Oximetry Analysis - 95% on room air, not hypoxic     Cardiac Monitor:  Rate: 65 bpm  Rhythm: Normal sinus    EKG interpretation: (Preliminary)  EKG read by Dr. Estelle Barroso at 10:56 AM  Normal sinus rhythm at a rate of 67 bpm, FL interval 160 ms, QRS duration of 88 ms, similar morphology when compared to prior from October 2020    Records Reviewed: Nursing Notes, Old Medical Records and Previous electrocardiograms    Provider Notes (Medical Decision Making): Carmen Brady is a 80 y.o. female presenting with history and exam consistent with an accidental heroin overdose. Patient has been hemodynamically stable during observation here and has not required additional Narcan after which she was given in the field by EMS. Counseled patient on the danger of continued heroin use and provided referrals to primary care and [facility] for follow-up and a prescription for Narcan. Plan for discharge with strict return precautions. Patient understands and agrees with this plan. Procedures:  Procedures    ED Course:   1:04 PM  Updated patient on all results and plan. All questions answered. Diagnosis and Disposition     Critical Care: None    DISCHARGE NOTE:    Sheyla Oliver's  results have been reviewed with her. She has been counseled regarding her diagnosis, treatment, and plan. She verbally conveys understanding and agreement of the signs, symptoms, diagnosis, treatment and prognosis and additionally agrees to follow up as discussed. She also agrees with the care-plan and conveys that all of her questions have been answered.   I have also provided discharge instructions for her that include: educational information regarding their diagnosis and treatment, and list of reasons why they would want to return to the ED prior to their follow-up appointment, should her condition change. She has been provided with education for proper emergency department utilization. CLINICAL IMPRESSION:    1. Accidental overdose of heroin, initial encounter ([facility] [address]        PLAN:  1. D/C Home  2. Current Discharge Medication List      START taking these medications    Details   naloxone (Narcan) 4 mg/actuation nasal spray Use 1 spray intranasally, then discard. Repeat with new spray every 2 min as needed for opioid overdose symptoms, alternating nostrils. Qty: 2 Each, Refills: 0  Start date: 11/9/2021           3. Follow-up Information     Follow up With Specialties Details Why Contact Info    Kostas Bautista MD Internal Medicine Schedule an appointment as soon as possible for a visit   [address]la [address] 1934 [address] [facility] [address] [facility] [address]      [address] [facility]  Schedule an appointment as soon as possible for a visit   [address] [place] [address]    [facility] EMERGENCY DEPT Emergency Medicine  If symptoms worsen [address] [id number] [phone]        _______________________________      Please note that this dictation was completed with [employer], the computer voice recognition software. Quite often unanticipated grammatical, syntax, homophones, and other interpretive errors are inadvertently transcribed by the computer software. Please disregard these errors. Please excuse any errors that have escaped final proofreading.

## 2022-03-18 PROBLEM — I34.0 NONRHEUMATIC MITRAL VALVE REGURGITATION: Status: ACTIVE | Noted: 2020-09-13

## 2022-03-18 PROBLEM — R93.1 ABNORMAL ECHOCARDIOGRAM: Status: ACTIVE | Noted: 2020-09-11

## 2022-03-18 PROBLEM — R06.89 HYPERCARBIA: Status: ACTIVE | Noted: 2020-09-09

## 2022-03-18 PROBLEM — N17.9 AKI (ACUTE KIDNEY INJURY) (HCC): Status: ACTIVE | Noted: 2020-09-09

## 2022-03-18 PROBLEM — T40.3X1A METHADONE OVERDOSE (HCC): Status: ACTIVE | Noted: 2020-09-09

## 2022-03-19 PROBLEM — G93.40 ENCEPHALOPATHY ACUTE: Status: ACTIVE | Noted: 2020-09-09

## 2022-03-19 PROBLEM — R94.31 PROLONGED Q-T INTERVAL ON ECG: Status: ACTIVE | Noted: 2020-09-09

## 2022-03-19 PROBLEM — R00.1 BRADYCARDIA: Status: ACTIVE | Noted: 2020-09-09

## 2022-03-19 PROBLEM — I50.30 DIASTOLIC CONGESTIVE HEART FAILURE (HCC): Status: ACTIVE | Noted: 2020-09-13

## 2022-03-19 PROBLEM — D69.6 THROMBOCYTOPENIA (HCC): Status: ACTIVE | Noted: 2020-09-10

## 2022-03-19 PROBLEM — J96.21 ACUTE ON CHRONIC RESPIRATORY FAILURE WITH HYPOXIA AND HYPERCAPNIA (HCC): Status: ACTIVE | Noted: 2020-09-11

## 2022-03-19 PROBLEM — R16.2 HEPATOSPLENOMEGALY: Status: ACTIVE | Noted: 2020-09-13

## 2022-03-19 PROBLEM — D61.818 PANCYTOPENIA (HCC): Status: ACTIVE | Noted: 2020-09-12

## 2022-03-19 PROBLEM — J96.22 ACUTE ON CHRONIC RESPIRATORY FAILURE WITH HYPOXIA AND HYPERCAPNIA (HCC): Status: ACTIVE | Noted: 2020-09-11

## 2022-03-20 PROBLEM — R09.02 HYPOXIA: Status: ACTIVE | Noted: 2020-09-09

## 2022-04-19 NOTE — PROGRESS NOTES
0700 Bedside and Verbal shift change report given to [employer] (oncoming nurse) by Margrett Sicard RN (offgoing nurse). Report included the following information SBAR, Kardex, ED Summary, Intake/Output, MAR, Recent Results and Cardiac Rhythm SR/SB. 1900 Bedside and Verbal shift change report given to [employer] (oncoming nurse) by Elyse Wright RN (offgoing nurse). Report included the following information SBAR, Kardex, ED Summary, Intake/Output, MAR, Recent Results and Cardiac Rhythm SB/SR. No lesions; no rash

## 2022-11-26 ENCOUNTER — HOSPITAL ENCOUNTER (EMERGENCY)
Age: 72
Discharge: HOME | End: 2022-11-26
Attending: EMERGENCY MEDICINE
Payer: MEDICARE

## 2022-11-26 ENCOUNTER — APPOINTMENT (OUTPATIENT)
Dept: CT IMAGING | Age: 72
End: 2022-11-26
Attending: EMERGENCY MEDICINE
Payer: MEDICARE

## 2022-11-26 VITALS
SYSTOLIC BLOOD PRESSURE: 123 MMHG | HEART RATE: 64 BPM | WEIGHT: 183 LBS | DIASTOLIC BLOOD PRESSURE: 71 MMHG | HEIGHT: 62 IN | RESPIRATION RATE: 16 BRPM | TEMPERATURE: 98.2 F | BODY MASS INDEX: 33.68 KG/M2 | OXYGEN SATURATION: 97 %

## 2022-11-26 DIAGNOSIS — F11.10 HEROIN ABUSE (HCC): ICD-10-CM

## 2022-11-26 DIAGNOSIS — S00.03XA CONTUSION OF SCALP, INITIAL ENCOUNTER: ICD-10-CM

## 2022-11-26 DIAGNOSIS — T40.2X1A OPIOID OVERDOSE, ACCIDENTAL OR UNINTENTIONAL, INITIAL ENCOUNTER (HCC): ICD-10-CM
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
## 2022-11-26 LAB
ALBUMIN SERPL-MCNC: 3.9 G/DL (ref 3.4–5)
ALBUMIN/GLOB SERPL: 1.1 {RATIO} (ref 0.8–1.7)
ALP SERPL-CCNC: 123 U/L (ref 45–117)
ALT SERPL-CCNC: 28 U/L (ref 13–56)
AMMONIA PLAS-SCNC: 27 UMOL/L (ref 11–32)
AMPHET UR QL SCN: NEGATIVE
ANION GAP SERPL CALC-SCNC: 4 MMOL/L (ref 3–18)
AST SERPL-CCNC: 36 U/L (ref 10–38)
BARBITURATES UR QL SCN: NEGATIVE
BASOPHILS # BLD: 0 K/UL (ref 0–0.1)
BASOPHILS NFR BLD: 0 % (ref 0–2)
BENZODIAZ UR QL: NEGATIVE
BILIRUB SERPL-MCNC: 0.7 MG/DL (ref 0.2–1)
BUN SERPL-MCNC: 14 MG/DL (ref 7–18)
BUN/CREAT SERPL: 13 (ref 12–20)
CALCIUM SERPL-MCNC: 9.6 MG/DL (ref 8.5–10.1)
CANNABINOIDS UR QL SCN: POSITIVE
CHLORIDE SERPL-SCNC: 100 MMOL/L (ref 100–111)
CO2 SERPL-SCNC: 31 MMOL/L (ref 21–32)
COCAINE UR QL SCN: NEGATIVE
CREAT SERPL-MCNC: 1.11 MG/DL (ref 0.6–1.3)
DIFFERENTIAL METHOD BLD: ABNORMAL
EOSINOPHIL # BLD: 0.1 K/UL (ref 0–0.4)
EOSINOPHIL NFR BLD: 3 % (ref 0–5)
ERYTHROCYTE [DISTWIDTH] IN BLOOD BY AUTOMATED COUNT: 14.4 % (ref 11.6–14.5)
ETHANOL SERPL-MCNC: <3 MG/DL (ref 0–3)
GLOBULIN SER CALC-MCNC: 3.5 G/DL (ref 2–4)
GLUCOSE BLD STRIP.AUTO-MCNC: 111 MG/DL (ref 70–110)
GLUCOSE SERPL-MCNC: 117 MG/DL (ref 74–99)
HCT VFR BLD AUTO: 37.9 % (ref 35–45)
HDSCOM,HDSCOM: ABNORMAL
HGB BLD-MCNC: 12.7 G/DL (ref 12–16)
IMM GRANULOCYTES # BLD AUTO: 0 K/UL (ref 0–0.04)
IMM GRANULOCYTES NFR BLD AUTO: 0 % (ref 0–0.5)
INR PPP: 1.3 (ref 0.8–1.2)
LYMPHOCYTES # BLD: 0.5 K/UL (ref 0.9–3.6)
LYMPHOCYTES NFR BLD: 22 % (ref 21–52)
MCH RBC QN AUTO: 28.9 PG (ref 24–34)
MCHC RBC AUTO-ENTMCNC: 33.5 G/DL (ref 31–37)
MCV RBC AUTO: 86.3 FL (ref 78–100)
METHADONE UR QL: NEGATIVE
MONOCYTES # BLD: 0.2 K/UL (ref 0.05–1.2)
MONOCYTES NFR BLD: 10 % (ref 3–10)
NEUTS SEG # BLD: 1.6 K/UL (ref 1.8–8)
NEUTS SEG NFR BLD: 65 % (ref 40–73)
NRBC # BLD: 0 K/UL (ref 0–0.01)
NRBC BLD-RTO: 0 PER 100 WBC
OPIATES UR QL: POSITIVE
PCP UR QL: NEGATIVE
PLATELET # BLD AUTO: 60 K/UL (ref 135–420)
PMV BLD AUTO: 11.1 FL (ref 9.2–11.8)
POTASSIUM SERPL-SCNC: 4.6 MMOL/L (ref 3.5–5.5)
PROT SERPL-MCNC: 7.4 G/DL (ref 6.4–8.2)
PROTHROMBIN TIME: 16.1 SEC (ref 11.5–15.2)
RBC # BLD AUTO: 4.39 M/UL (ref 4.2–5.3)
SODIUM SERPL-SCNC: 135 MMOL/L (ref 136–145)
WBC # BLD AUTO: 2.4 K/UL (ref 4.6–13.2)

## 2022-11-26 PROCEDURE — 82962 GLUCOSE BLOOD TEST: CPT

## 2022-11-26 PROCEDURE — 74011250636 HC RX REV CODE- 250/636: Performed by: EMERGENCY MEDICINE

## 2022-11-26 PROCEDURE — 99284 EMERGENCY DEPT VISIT MOD MDM: CPT

## 2022-11-26 PROCEDURE — 80053 COMPREHEN METABOLIC PANEL: CPT

## 2022-11-26 PROCEDURE — 70450 CT HEAD/BRAIN W/O DYE: CPT

## 2022-11-26 PROCEDURE — 90715 TDAP VACCINE 7 YRS/> IM: CPT | Performed by: EMERGENCY MEDICINE

## 2022-11-26 PROCEDURE — 96374 THER/PROPH/DIAG INJ IV PUSH: CPT

## 2022-11-26 PROCEDURE — 90471 IMMUNIZATION ADMIN: CPT

## 2022-11-26 PROCEDURE — 85610 PROTHROMBIN TIME: CPT

## 2022-11-26 PROCEDURE — 80307 DRUG TEST PRSMV CHEM ANLYZR: CPT

## 2022-11-26 PROCEDURE — 85025 COMPLETE CBC W/AUTO DIFF WBC: CPT

## 2022-11-26 PROCEDURE — 82140 ASSAY OF AMMONIA: CPT

## 2022-11-26 PROCEDURE — 82077 ASSAY SPEC XCP UR&BREATH IA: CPT

## 2022-11-26 RX ORDER — NALOXONE HYDROCHLORIDE 1 MG/ML
0.2 INJECTION INTRAMUSCULAR; INTRAVENOUS; SUBCUTANEOUS ONCE
Status: COMPLETED | OUTPATIENT
Start: 2022-11-26 | End: 2022-11-26

## 2022-11-26 RX ORDER — LORAZEPAM 1 MG/1
0.5 TABLET ORAL
Status: DISCONTINUED | OUTPATIENT
Start: 2022-11-26 | End: 2022-11-26 | Stop reason: HOSPADM

## 2022-11-26 RX ORDER — ONDANSETRON 2 MG/ML
4 INJECTION INTRAMUSCULAR; INTRAVENOUS
Status: DISCONTINUED | OUTPATIENT
Start: 2022-11-26 | End: 2022-11-26 | Stop reason: HOSPADM

## 2022-11-26 RX ADMIN — NALOXONE HYDROCHLORIDE 0.2 MG: 1 INJECTION PARENTERAL at 09:06

## 2022-11-26 RX ADMIN — TETANUS TOXOID, REDUCED DIPHTHERIA TOXOID AND ACELLULAR PERTUSSIS VACCINE, ADSORBED 0.5 ML: 5; 2.5; 8; 8; 2.5 SUSPENSION INTRAMUSCULAR at 06:14

## 2022-11-26 NOTE — DISCHARGE INSTRUCTIONS
You were evaluated in the emergency department for fall with head injury after heroin use which required Narcan administration in the ED. Your examination was reassuring as was your work-up including CT scan of your head, blood work, and EKG. It will be important for you to follow-up with your primary care physician in 3-5 days. If you develop worsening symptoms such as confusion or chest pain, or shortness of breath, please return to the emergency department immediately.

## 2022-11-26 NOTE — ED TRIAGE NOTES
Pt arrives via EMS w c/o fall and head injury. Pt reports having 2 shot of vodka earlier, slipped and fell while leaving [employer]. Denies LOC. Large hematoma to back of head, pt reports pain to right shoulder, neck, and arm. Pt has hx of alcoholism and cirrhosis. AAOx4, resp e/u, NADN. Slurred speech noted.   per EMS

## 2022-11-26 NOTE — ED PROVIDER NOTES
Patient received in signout from Dr. Hardy Mckenna pending clinical sobriety. In short this is a 29-year-old female presenting after a fall outside of a [employer]. She initially admitted to drinking 3 vodkas. UDS demonstrating opiates and THC and alcohol level negative. When questioned patient admits to using heroin this evening. She is awake alert and oriented but frequently falls back asleep and is not able to ambulate independently. CT head noted to be normal as is the remainder of her work-up. On my evaluation patient is indeed awake alert and oriented x3 but continues to fall back asleep frequently and her presentation appears to be consistent with opioid overdose. I administered 0.2 mg IV Narcan with significant immediate response. Patient is now awake without any further somnolence and is ambulatory out of bed, requesting discharge home. I will continue to monitor for about 2 hours and feed patient while she is figuring out a ride home. 11:00 AM: Patient has been monitored for 2 hours status post Narcan administration, she has tolerated a full meal, ambulatory around ED. No lethargy or confusion. Stable for discharge home at this time.     Tony Morrison MD

## 2022-11-26 NOTE — ED NOTES
Pt is now alert and wake stating \"I need to go! I need to get out of here. I gotta get home and feed my cat. \" Pt is restless and walking around room. Told patient we would get her some food and work on getting her a ride while we continued to monitor her.

## 2022-11-26 NOTE — ED PROVIDER NOTES
EMERGENCY DEPARTMENT HISTORY AND PHYSICAL EXAM      Date: 11/26/2022  Patient Name: Flora Sandoval    History of Presenting Illness     Chief Complaint   Patient presents with    Fall       History Provided By: Patient and EMS    HPI: Flora Sandoval, 71 y.o. female brought to ED via EMS, EMS report patient with head injury. Patient reports that she had 2 shots of vodka earlier this evening, she was leaving [employer] when she slipped and fell. She denies loss of consciousness. There is small hematoma back of her head but she denies headache or neck pain. She denies chest pain. She denies nausea or vomiting. There are no other complaints, changes, or physical findings at this time. Past History     Past Medical History:  Past Medical History:   Diagnosis Date    Chronic back pain     Diabetes ([facility] [address]     Drug abuse ([facility] [address]     Hepatic cirrhosis ([facility] [address]     Hepatitis C     Heroin abuse ([facility] [address]     Pain management     Restless leg syndrome     Sciatica     Spleen enlarged     Thyroid disease        Past Surgical History:  Past Surgical History:   Procedure Laterality Date    HX CHOLECYSTECTOMY      HX GYN      hysterectomy    HX HEENT      T&A    HX ORTHOPAEDIC      Bunion, left hand and right arm abscess removal       Family History:  No family history on file. Social History:  Social History     Tobacco Use    Smoking status: Every Day    Smokeless tobacco: Never   Substance Use Topics    Alcohol use: No    Drug use: Yes       Allergies: Allergies   Allergen Reactions    Erythromycin Hives    Penicillins Hives    Tetracycline Hives       PCP: Jody Campbell MD    No current facility-administered medications on file prior to encounter. Current Outpatient Medications on File Prior to Encounter   Medication Sig Dispense Refill    naloxone (Narcan) 4 mg/actuation nasal spray Use 1 spray intranasally, then discard.  Repeat with new spray every 2 min as needed for opioid overdose symptoms, alternating nostrils. 2 Each 0    tiotropium bromide (Spiriva Respimat) 2.5 mcg/actuation inhaler Take 2 Puffs by inhalation daily. fluticasone furoate-vilanteroL (Breo Ellipta) 100-25 mcg/dose inhaler Take 1 Puff by inhalation daily. Omeprazole delayed release (PRILOSEC D/R) 20 mg tablet Take 20 mg by mouth two (2) times a day. (Patient not taking: Reported on 6/27/2021)      multivitamin with minerals (MULTIVITAMIN & MINERAL FORMULA PO) Take 1 Tab by mouth daily. escitalopram oxalate (LEXAPRO) 20 mg tablet Take 10 mg by mouth daily. methocarbamoL (ROBAXIN) 500 mg tablet Take 500 mg by mouth every six (6) hours as needed for Muscle Spasm(s). cyclobenzaprine (FLEXERIL) 10 mg tablet Take 10 mg by mouth three (3) times daily as needed for Muscle Spasm(s). traZODone (DESYREL) 100 mg tablet Take 100 mg by mouth nightly. hydrOXYzine HCL (ATARAX) 50 mg tablet Take 50 mg by mouth two (2) times daily as needed for Anxiety. (Patient not taking: Reported on 6/27/2021)      predniSONE (STERAPRED) 5 mg dose pack See administration instruction per 5mg dose pack (Patient not taking: Reported on 6/27/2021) 21 Tab 0    fluticasone propion-salmeteroL (Advair Diskus) 250-50 mcg/dose diskus inhaler Take 1 Puff by inhalation every twelve (12) hours. 1 Inhaler 0    albuterol sulfate (PROAIR RESPICLICK) 90 mcg/actuation breath activated inhaler Take 1-2 Puffs by inhalation every four (4) hours as needed for Wheezing, Shortness of Breath, Respiratory Distress or Cough. 1 Inhaler 0    furosemide (Lasix) 20 mg tablet Take 0.5 Tabs by mouth daily. 3 Tab 0    prazosin (MINIPRESS) 2 mg capsule Take 2 mg by mouth nightly. vit A/vit C/vit E/zinc/copper (PRESERVISION AREDS PO) Take 1 Cap by mouth two (2) times a day. rifAXIMin (XIFAXAN) 550 mg tablet Take 550 mg by mouth two (2) times a day. zinc 50 mg tab tablet Take 50 mg by mouth daily.       lurasidone (Latuda) 40 mg tab tablet Take 40 mg by mouth daily (with dinner). pregabalin (Lyrica) 75 mg capsule Take 75 mg by mouth two (2) times a day. levothyroxine (SYNTHROID) 50 mcg tablet Take 50 mcg by mouth Daily (before breakfast). rOPINIRole (REQUIP) 0.25 mg tablet Take 2 mg by mouth nightly as needed. Indications: restless legs syndrome, an extreme discomfort in the calf muscles when sitting or lying down      spironolactone (ALDACTONE) 25 mg tablet Take  by mouth daily. Review of Systems   Review of Systems   All other systems reviewed and are negative. Physical Exam   Physical Exam  Vitals and nursing note reviewed. Constitutional:       General: She is not in acute distress. Appearance: She is well-developed. She is not diaphoretic. Comments: Patient appears in no acute distress. He is slightly lethargic but able to follow commands. She is oriented x3. HENT:      Head: Normocephalic and atraumatic. Jaw: No trismus. Comments: She has dressing with dry blood occiput area. Upon removal of dressing, there is a tiny area of abrasion occipital area with no active bleeding. Right Ear: External ear normal. No swelling or tenderness. Tympanic membrane is not perforated, erythematous or bulging. Left Ear: External ear normal. No swelling or tenderness. Tympanic membrane is not perforated, erythematous or bulging. Nose: Nose normal. No mucosal edema or rhinorrhea. Right Sinus: No maxillary sinus tenderness or frontal sinus tenderness. Left Sinus: No maxillary sinus tenderness or frontal sinus tenderness. Mouth/Throat:      Mouth: No oral lesions. Dentition: No dental abscesses. Pharynx: Uvula midline. No oropharyngeal exudate, posterior oropharyngeal erythema or uvula swelling. Tonsils: No tonsillar abscesses. Eyes:      General: No scleral icterus. Right eye: No discharge. Left eye: No discharge.       Conjunctiva/sclera: Conjunctivae normal. Cardiovascular:      Rate and Rhythm: Normal rate and regular rhythm. Heart sounds: Normal heart sounds. No murmur heard. No friction rub. No gallop. Pulmonary:      Effort: Pulmonary effort is normal. No tachypnea, accessory muscle usage or respiratory distress. Breath sounds: Normal breath sounds. No decreased breath sounds, wheezing, rhonchi or rales. Abdominal:      Palpations: Abdomen is soft. Musculoskeletal:         General: No tenderness. Normal range of motion. Cervical back: Normal range of motion and neck supple. Lymphadenopathy:      Cervical: No cervical adenopathy. Skin:     General: Skin is warm and dry. Findings: No erythema or rash. Comments: Skin with areas of vitiligo. Neurological:      Mental Status: She is alert and oriented to person, place, and time. Psychiatric:         Judgment: Judgment normal.       Lab and Diagnostic Study Results   Labs -   No results found for this or any previous visit (from the past 12 hour(s)). Radiologic Studies -   @lastxrresult@  CT Results  (Last 48 hours)                 11/26/22 0444  CT HEAD WO CONT Final result    Impression:          1. No acute intracranial abnormality. 2. Posterior superior scalp contusion. Narrative:  EXAM: CT head       INDICATION: Head injury. COMPARISON: CT head 12 September 2020       TECHNIQUE: Axial CT imaging of the head was performed without intravenous   contrast. Standard multiplanar coronal and sagittal reformatted images were   obtained and are included in interpretation. One or more dose reduction techniques were used on this CT: automated exposure   control, adjustment of the mAs and/or kVp according to patient size, and   iterative reconstruction techniques. The specific techniques used on this CT   exam have been documented in the patient's electronic medical record.   Digital   Imaging and Communications in Medicine (DICOM) format image data are available to nonaffiliated external healthcare facilities or entities on a secure, media   free, reciprocally searchable basis with patient authorization for at least a   12-month period after this study. _______________       FINDINGS:       BRAIN AND POSTERIOR FOSSA: Cortical and cerebellar volume loss, similar to   prior. Stable ventricular size and configuration. Basilar cisterns. There is no   intracranial hemorrhage, mass effect, or midline shift. Gray-white matter   differentiation is within normal limits. EXTRA-AXIAL SPACES AND MENINGES: There are no abnormal extra-axial fluid   collections. CALVARIUM: Intact. SINUSES: Clear. OTHER: Posterior superior scalp contusion. .       _______________                 CXR Results  (Last 48 hours)      None            Medical Decision Making and ED Course   Differential Diagnosis & Medical Decision Making Provider Note:       - I am the first provider for this patient. I reviewed the vital signs, available nursing notes, past medical history, past surgical history, family history and social history. The patients presenting problems have been discussed, and they are in agreement with the care plan formulated and outlined with them. I have encouraged them to ask questions as they arise throughout their visit. Vital Signs-Reviewed the patient's vital signs. No data found. ED Course:     Patient lethargic but oriented x3 at ED arrival.  The also remembers falling, states she slipped at [employer] and fell on her back. No loss of consciousness. Initially she advised that she had 3 vodka last night. However her alcohol came back at 0. Urine drug screen shows THC and opiates. On review of patient's medical records she does have a history of cirrhosis, chronic pain, was last seen in the ED with heroin overdose. I discussed inhaler use with patient who admits she used heroin last night.   Is still lethargic, she is easily arousable, still oriented x3, she is able to give me her address where she lives and states she lives alone. She uses a cane. Given a history of cirrhosis, obtained ammonia which was also normal.  Patient states that she lives alone. Given her heroin use last night, would observe patient until she is sober. Will sign outpatient to Dr. Stephanie Saunders to observe patient and discharged when more sober. Procedures     Disposition   Disposition: Condition stable and improved  DC- Adult Discharges: All of the diagnostic tests were reviewed and questions answered. Diagnosis, care plan and treatment options were discussed. The patient understands the instructions and will follow up as directed. The patients results have been reviewed with them. They have been counseled regarding their diagnosis. The patient verbally convey understanding and agreement of the signs, symptoms, diagnosis, treatment and prognosis and additionally agrees to follow up as recommended with their PCP in 24 - 48 hours. They also agree with the care-plan and convey that all of their questions have been answered. I have also put together some discharge instructions for them that include: 1) educational information regarding their diagnosis, 2) how to care for their diagnosis at home, as well a 3) list of reasons why they would want to return to the ED prior to their follow-up appointment, should their condition change. DISCHARGE PLAN:  1. Current Discharge Medication List        CONTINUE these medications which have NOT CHANGED    Details   naloxone (Narcan) 4 mg/actuation nasal spray Use 1 spray intranasally, then discard. Repeat with new spray every 2 min as needed for opioid overdose symptoms, alternating nostrils. Qty: 2 Each, Refills: 0      tiotropium bromide (Spiriva Respimat) 2.5 mcg/actuation inhaler Take 2 Puffs by inhalation daily. fluticasone furoate-vilanteroL (Breo Ellipta) 100-25 mcg/dose inhaler Take 1 Puff by inhalation daily.       Omeprazole delayed release (PRILOSEC D/R) 20 mg tablet Take 20 mg by mouth two (2) times a day. multivitamin with minerals (MULTIVITAMIN & MINERAL FORMULA PO) Take 1 Tab by mouth daily. escitalopram oxalate (LEXAPRO) 20 mg tablet Take 10 mg by mouth daily. methocarbamoL (ROBAXIN) 500 mg tablet Take 500 mg by mouth every six (6) hours as needed for Muscle Spasm(s). cyclobenzaprine (FLEXERIL) 10 mg tablet Take 10 mg by mouth three (3) times daily as needed for Muscle Spasm(s). traZODone (DESYREL) 100 mg tablet Take 100 mg by mouth nightly. hydrOXYzine HCL (ATARAX) 50 mg tablet Take 50 mg by mouth two (2) times daily as needed for Anxiety. predniSONE (STERAPRED) 5 mg dose pack See administration instruction per 5mg dose pack  Qty: 21 Tab, Refills: 0      fluticasone propion-salmeteroL (Advair Diskus) 250-50 mcg/dose diskus inhaler Take 1 Puff by inhalation every twelve (12) hours. Qty: 1 Inhaler, Refills: 0      albuterol sulfate (PROAIR RESPICLICK) 90 mcg/actuation breath activated inhaler Take 1-2 Puffs by inhalation every four (4) hours as needed for Wheezing, Shortness of Breath, Respiratory Distress or Cough. Qty: 1 Inhaler, Refills: 0      furosemide (Lasix) 20 mg tablet Take 0.5 Tabs by mouth daily. Qty: 3 Tab, Refills: 0      prazosin (MINIPRESS) 2 mg capsule Take 2 mg by mouth nightly. vit A/vit C/vit E/zinc/copper (PRESERVISION AREDS PO) Take 1 Cap by mouth two (2) times a day. rifAXIMin (XIFAXAN) 550 mg tablet Take 550 mg by mouth two (2) times a day. zinc 50 mg tab tablet Take 50 mg by mouth daily. lurasidone (Latuda) 40 mg tab tablet Take 40 mg by mouth daily (with dinner). pregabalin (Lyrica) 75 mg capsule Take 75 mg by mouth two (2) times a day. levothyroxine (SYNTHROID) 50 mcg tablet Take 50 mcg by mouth Daily (before breakfast). rOPINIRole (REQUIP) 0.25 mg tablet Take 2 mg by mouth nightly as needed.  Indications: restless legs syndrome, an extreme discomfort in the calf muscles when sitting or lying down      spironolactone (ALDACTONE) 25 mg tablet Take  by mouth daily. 2.   Follow-up Information       Follow up With Specialties Details Why Contact Info    Tr Bautista MD Internal Medicine Physician Schedule an appointment as soon as possible for a visit   Perla [address] [address]  [phone]      [facility] EMERGENCY DEPT Emergency Medicine Go to  As needed, If symptoms worsen [address]  [place] [address]  [phone]          3. Return to ED if worse   4. Discharge Medication List as of 11/26/2022 10:59 AM         Remove if admitted/transferred    Diagnosis/Clinical Impression     Clinical Impression:   1. Fall, initial encounter    2. Contusion of scalp, initial encounter    3. Heroin abuse ([facility] [address]    4. Opioid overdose, accidental or unintentional, initial encounter ([facility] [address]        Attestations: Rylan Hurst MD, am the primary clinician of record. Please note that this dictation was completed with [employer], the [employer] voice recognition software. Quite often unanticipated grammatical, syntax, homophones, and other interpretive errors are inadvertently transcribed by the computer software. Please disregard these errors. Please excuse any errors that have escaped final proofreading. Thank you.

## 2023-03-11 ENCOUNTER — HOSPITAL ENCOUNTER (EMERGENCY)
Facility: HOSPITAL | Age: 73
Discharge: HOME | End: 2023-03-11
Attending: EMERGENCY MEDICINE
Payer: MEDICARE

## 2023-03-11 VITALS
WEIGHT: 183 LBS | HEIGHT: 62 IN | HEART RATE: 77 BPM | BODY MASS INDEX: 33.68 KG/M2 | RESPIRATION RATE: 18 BRPM | SYSTOLIC BLOOD PRESSURE: 114 MMHG | OXYGEN SATURATION: 96 % | TEMPERATURE: 97.1 F | DIASTOLIC BLOOD PRESSURE: 92 MMHG

## 2023-03-11 DIAGNOSIS — N89.8 VAGINAL SORE: Primary | ICD-10-CM

## 2023-03-11 LAB
APPEARANCE UR: CLEAR
BACTERIA URNS QL MICRO: ABNORMAL /HPF
BILIRUB UR QL: ABNORMAL
COLOR UR: ABNORMAL
EPITH CASTS URNS QL MICRO: ABNORMAL /LPF (ref 0–5)
GLUCOSE UR STRIP.AUTO-MCNC: NEGATIVE MG/DL
HGB UR QL STRIP: NEGATIVE
KETONES UR QL STRIP.AUTO: ABNORMAL MG/DL
LEUKOCYTE ESTERASE UR QL STRIP.AUTO: ABNORMAL
MUCOUS THREADS URNS QL MICRO: ABNORMAL /LPF
NITRITE UR QL STRIP.AUTO: NEGATIVE
PH UR STRIP: 5.5 (ref 5–8)
PROT UR STRIP-MCNC: 30 MG/DL
RBC #/AREA URNS HPF: ABNORMAL /HPF (ref 0–5)
SERVICE CMNT-IMP: NORMAL
SP GR UR REFRACTOMETRY: >1.03 (ref 1–1.03)
UROBILINOGEN UR QL STRIP.AUTO: 1 EU/DL (ref 0.2–1)
WBC URNS QL MICRO: ABNORMAL /HPF (ref 0–5)
WET PREP GENITAL: NORMAL

## 2023-03-11 PROCEDURE — 87210 SMEAR WET MOUNT SALINE/INK: CPT

## 2023-03-11 PROCEDURE — 87255 GENET VIRUS ISOLATE HSV: CPT

## 2023-03-11 PROCEDURE — 99283 EMERGENCY DEPT VISIT LOW MDM: CPT

## 2023-03-11 PROCEDURE — 81001 URINALYSIS AUTO W/SCOPE: CPT

## 2023-03-11 PROCEDURE — 87591 N.GONORRHOEAE DNA AMP PROB: CPT

## 2023-03-11 RX ORDER — LIDOCAINE HYDROCHLORIDE 20 MG/ML
15 SOLUTION OROPHARYNGEAL PRN
Qty: 100 ML | Refills: 0 | Status: SHIPPED | OUTPATIENT
Start: 2023-03-11

## 2023-03-11 RX ORDER — ACYCLOVIR 800 MG/1
800 TABLET ORAL
Qty: 35 TABLET | Refills: 0 | Status: SHIPPED | OUTPATIENT
Start: 2023-03-11 | End: 2023-03-18

## 2023-03-11 ASSESSMENT — PAIN - FUNCTIONAL ASSESSMENT: PAIN_FUNCTIONAL_ASSESSMENT: NONE - DENIES PAIN

## 2023-03-11 NOTE — ED TRIAGE NOTES
Pt arrived with c/o \"blisters to the lip of my vagina\". Pt states she noted the blisters 3 days ago but today was painful when she sat down. Pt is alert and oriented x4. Vital signs are stable.

## 2023-03-11 NOTE — ED PROVIDER NOTES
EMERGENCY DEPARTMENT HISTORY AND PHYSICAL EXAM      Patient Name: Glenny Randall  MRN: [id number]  YOB: 1950  Provider: JESSICA Grove  PCP: Rob Bermudez MD   Time/Date of evaluation: 1:08 PM EST on 3/11/23    History of Presenting Illness     Chief Complaint   Patient presents with    Vaginal Pain     blisters       History Provided By: Patient     History Bertram Holguin): Glenny Randall is a 71 y.o. female with a PMHX of diabetes, history of IV drug use, cirrhosis, hepatitis C, history of HSV  who presents to the emergency department  by POV C/O lesion to vagina for the past 3 days. States lesion is very painful. She does have a history of HSV in the past.  States she is not currently sexually active. States she has not been sexually active for the past 6 years. Denies any pelvic pain or abdominal pain. No fevers. No nausea vomiting diarrhea. Past History     Past Medical History:  Past Medical History:   Diagnosis Date    Chronic back pain     Diabetes ([facility] [address]     Drug abuse ([facility] [address]     Hepatic cirrhosis ([facility] [address]     Hepatitis C     Heroin abuse ([facility] [address]     Pain management     Restless leg syndrome     Sciatica     Spleen enlarged     Thyroid disease        Past Surgical History:  Past Surgical History:   Procedure Laterality Date    CHOLECYSTECTOMY      GYN      hysterectomy    HEENT      T&A    ORTHOPEDIC SURGERY      Bunion, left hand and right arm abscess removal       Family History:  No family history on file. Social History:  Social History     Tobacco Use    Smoking status: Every Day    Smokeless tobacco: Never   Substance Use Topics    Alcohol use: No    Drug use: Yes       Medications:  No current facility-administered medications for this encounter.      Current Outpatient Medications   Medication Sig Dispense Refill    acyclovir (ZOVIRAX) 800 MG tablet Take 1 tablet by mouth 5 (five) times a day for 7 days 35 tablet 0    lidocaine viscous hcl (XYLOCAINE) 2 % SOLN solution Take 15 mLs by mouth as needed for Irritation 100 mL 0    albuterol sulfate (PROAIR RESPICLICK) 935 (90 Base) MCG/ACT aerosol powder inhalation Inhale 1-2 puffs into the lungs every 4 hours as needed      cyclobenzaprine (FLEXERIL) 10 MG tablet Take 10 mg by mouth 3 times daily as needed      escitalopram (LEXAPRO) 20 MG tablet Take 10 mg by mouth daily      Fluticasone Furoate-Vilanterol (BREO ELLIPTA) 100-25 MCG/ACT AEPB Inhale 1 puff into the lungs daily      fluticasone-salmeterol (ADVAIR DISKUS) 250-50 MCG/ACT AEPB diskus inhaler Inhale 1 puff into the lungs every 12 hours      furosemide (LASIX) 20 MG tablet Take 10 mg by mouth daily      hydrOXYzine HCl (ATARAX) 50 MG tablet Take 50 mg by mouth 2 times daily as needed      levothyroxine (SYNTHROID) 50 MCG tablet Take 50 mcg by mouth every morning (before breakfast)      lurasidone (LATUDA) 40 MG TABS tablet Take 40 mg by mouth Daily with supper      methocarbamol (ROBAXIN) 500 MG tablet Take 500 mg by mouth every 6 hours as needed      naloxone (NARCAN) 4 MG/0.1ML LIQD nasal spray Use 1 spray intranasally, then discard. Repeat with new spray every 2 min as needed for opioid overdose symptoms, alternating nostrils. omeprazole (PRILOSEC OTC) 20 MG tablet Take 20 mg by mouth 2 times daily      prazosin (MINIPRESS) 2 MG capsule Take 2 mg by mouth      predniSONE (DELTASONE) 5 MG tablet See administration instruction per 5mg dose pack      pregabalin (LYRICA) 75 MG capsule Take 75 mg by mouth 2 times daily. rifAXIMin (XIFAXAN) 550 MG tablet Take 550 mg by mouth 2 times daily      rOPINIRole (REQUIP) 0.25 MG tablet Take 2 mg by mouth      spironolactone (ALDACTONE) 25 MG tablet Take by mouth daily      tiotropium (SPIRIVA RESPIMAT) 2.5 MCG/ACT AERS inhaler Inhale 2 puffs into the lungs daily      traZODone (DESYREL) 100 MG tablet Take 100 mg by mouth         Allergies:   Allergies   Allergen Reactions    Erythromycin Hives Penicillins Hives    Tetracycline Hives       Social Determinants of Health:  Social Determinants of Health     Tobacco Use: High Risk    Smoking Tobacco Use: Every Day    Smokeless Tobacco Use: Never    Passive Exposure: Not on file   Alcohol Use: Not on file   Financial Resource Strain: Not on file   Food Insecurity: Not on file   Transportation Needs: Not on file   Physical Activity: Not on file   Stress: Not on file   Social Connections: Not on file   Intimate Partner Violence: Not on file   Depression: Not on file   Housing Stability: Not on file       Review of Systems     Negative except as listed above in HPI. Physical Exam   Physical Exam  Vitals and nursing note reviewed. Exam conducted with a chaperone present. Constitutional:       General: She is not in acute distress. Appearance: Normal appearance. She is not ill-appearing, toxic-appearing or diaphoretic. HENT:      Head: Normocephalic and atraumatic. Cardiovascular:      Rate and Rhythm: Normal rate and regular rhythm. Pulmonary:      Effort: Pulmonary effort is normal. No respiratory distress. Breath sounds: Normal breath sounds. Abdominal:      General: Bowel sounds are normal.      Palpations: Abdomen is soft. Tenderness: There is no abdominal tenderness. There is no right CVA tenderness or left CVA tenderness. Genitourinary:         Comments: No internal exam performed  Skin:     General: Skin is warm and dry. Neurological:      General: No focal deficit present. Mental Status: She is alert. Diagnostic Study Results     Labs:  No results found for this or any previous visit (from the past 12 hour(s)). Radiologic Studies:   No orders to display       Procedures     Procedures    ED Course     1:08 PM LEANN Gregg am the first provider for this patient. Initial assessment performed.  I reviewed the vital signs, available nursing notes, past medical history, past surgical history, family history and social history. The patients presenting problems have been discussed, and they are in agreement with the care plan formulated and outlined with them. I have encouraged them to ask questions as they arise throughout their visit. Records Reviewed: Nursing Notes and Old Medical Records    Is this patient to be included in the SEP-1 core measure due to severe sepsis or septic shock? No Exclusion criteria - the patient is NOT to be included for SEP-1 Core Measure due to: Infection is not suspected    MEDICATIONS ADMINISTERED IN THE ED:  Medications - No data to display         Medical Decision Making     CC/HPI Summary, DDx, ED Course, and Reassessment: Painful lesion to the vulva for the past several days gradually worsening. She does have a history of HSV. Lesion was swabbed does appear consistent with HSV flare. States she has not been sexually active for the past 6 years. No internal exam performed but wet prep and gonorrhea chlamydia swabs obtained. Wet prep is normal.  GC chlamydia pending. She has no pelvic pain. Urine shows only small leukocyte Estrace but no white blood cells and few epithelial cells likely contamination. Prescription for acyclovir sent to pharmacy as well as viscous lidocaine to help with pain    Disposition Considerations (tests considered but not done, Admit vs D/C, Shared Decision Making, Pt Expectation of Test or Tx.):          Diagnosis and Disposition       1.  Vaginal sore        DISPOSITION Decision To Discharge 03/11/2023 03:29:25 PM      PATIENT REFERRED TO:  Da Chambers MD          [facility] EMERGENCY DEPT  [address] [place] [address]  [phone]    If symptoms worsen    DISCHARGE MEDICATIONS:  Discharge Medication List as of 3/11/2023  3:47 PM        START taking these medications    Details   acyclovir (ZOVIRAX) 800 MG tablet Take 1 tablet by mouth 5 (five) times a day for 7 days, Disp-35 tablet, R-0Normal      lidocaine viscous hcl (XYLOCAINE) 2 % SOLN solution Take 15 mLs by mouth as needed for Irritation, Disp-100 mL, R-0Normal             DISCONTINUED MEDICATIONS:  Discharge Medication List as of 3/11/2023  3:47 PM                 (Please note that portions of this note were completed with a voice recognition program.  Efforts were made to edit the dictations but occasionally words are mis-transcribed.)    JESSICA Ward (electronically signed)    Magdalena MULLIGAN PA-C, am the primary clinician of record. Dragon Disclaimer     Please note that this dictation was completed with [employer], the computer voice recognition software. Quite often unanticipated grammatical, syntax, homophones, and other interpretive errors are inadvertently transcribed by the computer software. Please disregard these errors. Please excuse any errors that have escaped final proofreading.     Magdalena Johnson PA-C  (Electronically signed)         Luis Alberto Urrutia, [address]  03/12/23 0798

## 2023-03-13 LAB
C TRACH RRNA SPEC QL NAA+PROBE: NEGATIVE
N GONORRHOEA RRNA SPEC QL NAA+PROBE: NEGATIVE
SPECIMEN SOURCE: NORMAL
T VAGINALIS RRNA SPEC QL NAA+PROBE: NEGATIVE

## 2023-03-15 LAB
HSV SPEC CULT: NORMAL
SPECIMEN SOURCE: NORMAL

## 2023-12-10 ENCOUNTER — HOSPITAL ENCOUNTER (EMERGENCY)
Facility: HOSPITAL | Age: 73
Discharge: HOME | End: 2023-12-11
Attending: EMERGENCY MEDICINE
Payer: MEDICARE

## 2023-12-10 DIAGNOSIS — R11.2 NAUSEA AND VOMITING, UNSPECIFIED VOMITING TYPE: Primary | ICD-10-CM

## 2023-12-10 DIAGNOSIS — R10.9 ABDOMINAL PAIN, UNSPECIFIED ABDOMINAL LOCATION: ICD-10-CM

## 2023-12-10 LAB
ALBUMIN SERPL-MCNC: 3.3 G/DL (ref 3.4–5)
ALBUMIN/GLOB SERPL: 1.1 (ref 0.8–1.7)
ALP SERPL-CCNC: 63 U/L (ref 45–117)
ALT SERPL-CCNC: 122 U/L (ref 13–56)
ANION GAP SERPL CALC-SCNC: 2 MMOL/L (ref 3–18)
AST SERPL-CCNC: 128 U/L (ref 10–38)
BASOPHILS # BLD: 0 K/UL (ref 0–0.1)
BASOPHILS NFR BLD: 0 % (ref 0–2)
BILIRUB SERPL-MCNC: 0.5 MG/DL (ref 0.2–1)
BUN SERPL-MCNC: 19 MG/DL (ref 7–18)
BUN/CREAT SERPL: 20 (ref 12–20)
CALCIUM SERPL-MCNC: 9.6 MG/DL (ref 8.5–10.1)
CHLORIDE SERPL-SCNC: 106 MMOL/L (ref 100–111)
CO2 SERPL-SCNC: 31 MMOL/L (ref 21–32)
CREAT SERPL-MCNC: 0.93 MG/DL (ref 0.6–1.3)
DIFFERENTIAL METHOD BLD: ABNORMAL
EOSINOPHIL # BLD: 0 K/UL (ref 0–0.4)
EOSINOPHIL NFR BLD: 0 % (ref 0–5)
ERYTHROCYTE [DISTWIDTH] IN BLOOD BY AUTOMATED COUNT: 14.5 % (ref 11.6–14.5)
GLOBULIN SER CALC-MCNC: 3.1 G/DL (ref 2–4)
GLUCOSE SERPL-MCNC: 121 MG/DL (ref 74–99)
HCT VFR BLD AUTO: 31.5 % (ref 35–45)
HGB BLD-MCNC: 10.8 G/DL (ref 12–16)
IMM GRANULOCYTES # BLD AUTO: 0 K/UL (ref 0–0.04)
IMM GRANULOCYTES NFR BLD AUTO: 1 % (ref 0–0.5)
LACTATE SERPL-SCNC: 1 MMOL/L (ref 0.4–2)
LIPASE SERPL-CCNC: 70 U/L (ref 13–75)
LYMPHOCYTES # BLD: 0.5 K/UL (ref 0.9–3.6)
LYMPHOCYTES NFR BLD: 21 % (ref 21–52)
MCH RBC QN AUTO: 28.7 PG (ref 24–34)
MCHC RBC AUTO-ENTMCNC: 34.3 G/DL (ref 31–37)
MCV RBC AUTO: 83.8 FL (ref 78–100)
MONOCYTES # BLD: 0.2 K/UL (ref 0.05–1.2)
MONOCYTES NFR BLD: 9 % (ref 3–10)
NEUTS SEG # BLD: 1.6 K/UL (ref 1.8–8)
NEUTS SEG NFR BLD: 69 % (ref 40–73)
NRBC # BLD: 0 K/UL (ref 0–0.01)
NRBC BLD-RTO: 0 PER 100 WBC
PLATELET # BLD AUTO: 51 K/UL (ref 135–420)
PLATELET COMMENT: ABNORMAL
PMV BLD AUTO: 11.1 FL (ref 9.2–11.8)
POTASSIUM SERPL-SCNC: 3.8 MMOL/L (ref 3.5–5.5)
PROT SERPL-MCNC: 6.4 G/DL (ref 6.4–8.2)
RBC # BLD AUTO: 3.76 M/UL (ref 4.2–5.3)
RBC MORPH BLD: ABNORMAL
SODIUM SERPL-SCNC: 139 MMOL/L (ref 136–145)
TROPONIN I SERPL HS-MCNC: 33 NG/L (ref 0–54)
WBC # BLD AUTO: 2.3 K/UL (ref 4.6–13.2)

## 2023-12-10 PROCEDURE — 6360000002 HC RX W HCPCS: Performed by: EMERGENCY MEDICINE

## 2023-12-10 PROCEDURE — 93005 ELECTROCARDIOGRAM TRACING: CPT | Performed by: EMERGENCY MEDICINE

## 2023-12-10 PROCEDURE — 96376 TX/PRO/DX INJ SAME DRUG ADON: CPT

## 2023-12-10 PROCEDURE — 99285 EMERGENCY DEPT VISIT HI MDM: CPT

## 2023-12-10 PROCEDURE — 80053 COMPREHEN METABOLIC PANEL: CPT

## 2023-12-10 PROCEDURE — 83690 ASSAY OF LIPASE: CPT

## 2023-12-10 PROCEDURE — 83605 ASSAY OF LACTIC ACID: CPT

## 2023-12-10 PROCEDURE — 85025 COMPLETE CBC W/AUTO DIFF WBC: CPT

## 2023-12-10 PROCEDURE — 84484 ASSAY OF TROPONIN QUANT: CPT

## 2023-12-10 PROCEDURE — 96374 THER/PROPH/DIAG INJ IV PUSH: CPT

## 2023-12-10 PROCEDURE — 96375 TX/PRO/DX INJ NEW DRUG ADDON: CPT

## 2023-12-10 RX ORDER — ONDANSETRON 2 MG/ML
4 INJECTION INTRAMUSCULAR; INTRAVENOUS
Status: COMPLETED | OUTPATIENT
Start: 2023-12-11 | End: 2023-12-10

## 2023-12-10 RX ORDER — MORPHINE SULFATE 4 MG/ML
4 INJECTION, SOLUTION INTRAMUSCULAR; INTRAVENOUS
Status: COMPLETED | OUTPATIENT
Start: 2023-12-11 | End: 2023-12-10

## 2023-12-10 RX ADMIN — ONDANSETRON 4 MG: 2 INJECTION INTRAMUSCULAR; INTRAVENOUS at 23:53

## 2023-12-10 RX ADMIN — MORPHINE SULFATE 4 MG: 4 INJECTION, SOLUTION INTRAMUSCULAR; INTRAVENOUS at 23:56

## 2023-12-10 ASSESSMENT — PAIN SCALES - GENERAL
PAINLEVEL_OUTOF10: 7
PAINLEVEL_OUTOF10: 8

## 2023-12-10 ASSESSMENT — PAIN - FUNCTIONAL ASSESSMENT: PAIN_FUNCTIONAL_ASSESSMENT: 0-10

## 2023-12-10 ASSESSMENT — PAIN DESCRIPTION - LOCATION: LOCATION: ABDOMEN

## 2023-12-11 ENCOUNTER — APPOINTMENT (OUTPATIENT)
Facility: HOSPITAL | Age: 73
End: 2023-12-11
Payer: MEDICARE

## 2023-12-11 VITALS
HEART RATE: 53 BPM | DIASTOLIC BLOOD PRESSURE: 74 MMHG | RESPIRATION RATE: 14 BRPM | TEMPERATURE: 98.8 F | OXYGEN SATURATION: 95 % | SYSTOLIC BLOOD PRESSURE: 177 MMHG | HEIGHT: 68 IN | WEIGHT: 200 LBS | BODY MASS INDEX: 30.31 KG/M2

## 2023-12-11 LAB
APPEARANCE UR: CLEAR
BACTERIA URNS QL MICRO: ABNORMAL /HPF
BILIRUB UR QL: NEGATIVE
COLOR UR: YELLOW
EPITH CASTS URNS QL MICRO: ABNORMAL /LPF (ref 0–5)
GLUCOSE UR STRIP.AUTO-MCNC: NEGATIVE MG/DL
HGB UR QL STRIP: NEGATIVE
KETONES UR QL STRIP.AUTO: NEGATIVE MG/DL
LEUKOCYTE ESTERASE UR QL STRIP.AUTO: ABNORMAL
MUCOUS THREADS URNS QL MICRO: ABNORMAL /LPF
NITRITE UR QL STRIP.AUTO: NEGATIVE
PH UR STRIP: 7.5 (ref 5–8)
PROT UR STRIP-MCNC: ABNORMAL MG/DL
RBC #/AREA URNS HPF: ABNORMAL /HPF (ref 0–5)
SP GR UR REFRACTOMETRY: >1.03 (ref 1–1.03)
UROBILINOGEN UR QL STRIP.AUTO: 1 EU/DL (ref 0.2–1)
WBC URNS QL MICRO: ABNORMAL /HPF (ref 0–5)

## 2023-12-11 PROCEDURE — 96376 TX/PRO/DX INJ SAME DRUG ADON: CPT

## 2023-12-11 PROCEDURE — 6360000002 HC RX W HCPCS: Performed by: EMERGENCY MEDICINE

## 2023-12-11 PROCEDURE — 74177 CT ABD & PELVIS W/CONTRAST: CPT

## 2023-12-11 PROCEDURE — 6360000004 HC RX CONTRAST MEDICATION: Performed by: EMERGENCY MEDICINE

## 2023-12-11 PROCEDURE — 81001 URINALYSIS AUTO W/SCOPE: CPT

## 2023-12-11 RX ORDER — MORPHINE SULFATE 2 MG/ML
2 INJECTION, SOLUTION INTRAMUSCULAR; INTRAVENOUS
Status: COMPLETED | OUTPATIENT
Start: 2023-12-11 | End: 2023-12-11

## 2023-12-11 RX ADMIN — MORPHINE SULFATE 2 MG: 2 INJECTION, SOLUTION INTRAMUSCULAR; INTRAVENOUS at 03:30

## 2023-12-11 RX ADMIN — IOPAMIDOL 100 ML: 612 INJECTION, SOLUTION INTRAVENOUS at 01:10

## 2023-12-11 ASSESSMENT — ENCOUNTER SYMPTOMS
NAUSEA: 1
CONSTIPATION: 0
DIARRHEA: 0
ABDOMINAL PAIN: 1
VOMITING: 1

## 2023-12-11 ASSESSMENT — PAIN SCALES - GENERAL
PAINLEVEL_OUTOF10: 8
PAINLEVEL_OUTOF10: 5

## 2023-12-11 ASSESSMENT — PAIN DESCRIPTION - LOCATION: LOCATION: ABDOMEN

## 2023-12-11 NOTE — ED NOTES
Pt assisted to [facility]. Hat in place for urinalysis collection. Call light within reach; pt instructed to press call button if necessary and/or once done.      Yimi Joel RN  12/11/23 [id number]

## 2023-12-11 NOTE — ED PROVIDER NOTES
COURSE and DIFFERENTIAL DIAGNOSIS/MDM:   Vitals:    Vitals:    12/11/23 0130 12/11/23 0144 12/11/23 0145 12/11/23 0200   BP: (!) 167/56  (!) 162/54    Pulse:       Resp:       Temp:       SpO2: 95% 96%  99%   Weight:       Height:               Medical Decision Making  78-year-old female presents emergency department with unremarkable workup. Patient is not vomiting in ER. She is received morphine x 2 for pain. Will discharge home. Differential diagnosis appendicitis pancreatitis bowel obstruction SBP    Amount and/or Complexity of Data Reviewed  Labs: ordered. Radiology: ordered. ECG/medicine tests: ordered. Risk  Prescription drug management. REASSESSMENT          CONSULTS:  None    PROCEDURES:  Unless otherwise noted below, none     Procedures        FINAL IMPRESSION      1. Nausea and vomiting, unspecified vomiting type    2. Abdominal pain, unspecified abdominal location          DISPOSITION/PLAN   DISPOSITION Discharge - Pending Orders Complete 12/11/2023 02:45:50 AM      PATIENT REFERRED TO:  Puneet Guzman MD    Call today        DISCHARGE MEDICATIONS:  New Prescriptions    No medications on file     Controlled Substances Monitoring:          No data to display                (Please note that portions of this note were completed with a voice recognition program.  Efforts were made to edit the dictations but occasionally words are mis-transcribed. )    Eris Allison MD (electronically signed)  Attending Emergency Physician           Eris Allison MD  12/11/23 [id number]

## 2023-12-12 LAB
EKG ATRIAL RATE: 51 BPM
EKG DIAGNOSIS: NORMAL
EKG P AXIS: 44 DEGREES
EKG P-R INTERVAL: 154 MS
EKG Q-T INTERVAL: 456 MS
EKG QRS DURATION: 96 MS
EKG QTC CALCULATION (BAZETT): 420 MS
EKG R AXIS: 29 DEGREES
EKG T AXIS: 26 DEGREES
EKG VENTRICULAR RATE: 51 BPM

## 2024-02-06 ENCOUNTER — HOSPITAL ENCOUNTER (EMERGENCY)
Facility: HOSPITAL | Age: 74
Discharge: HOME | End: 2024-02-06
Attending: EMERGENCY MEDICINE
Payer: MEDICARE

## 2024-02-06 ENCOUNTER — APPOINTMENT (OUTPATIENT)
Facility: HOSPITAL | Age: 74
End: 2024-02-06
Payer: MEDICARE

## 2024-02-06 VITALS
BODY MASS INDEX: 27.28 KG/M2 | WEIGHT: 180 LBS | HEIGHT: 68 IN | DIASTOLIC BLOOD PRESSURE: 63 MMHG | HEART RATE: 70 BPM | OXYGEN SATURATION: 96 % | SYSTOLIC BLOOD PRESSURE: 188 MMHG | RESPIRATION RATE: 17 BRPM

## 2024-02-06 DIAGNOSIS — R41.82 ALTERED MENTAL STATUS, UNSPECIFIED ALTERED MENTAL STATUS TYPE: Primary | ICD-10-CM

## 2024-02-06 DIAGNOSIS — T42.4X1A ACCIDENTAL BENZODIAZEPINE POISONING, INITIAL ENCOUNTER: ICD-10-CM

## 2024-02-06 DIAGNOSIS — F19.10 POLYSUBSTANCE ABUSE (HCC): ICD-10-CM

## 2024-02-06 DIAGNOSIS — T40.601A ACCIDENTAL OPIATE POISONING, INITIAL ENCOUNTER (HCC): ICD-10-CM

## 2024-02-06 DIAGNOSIS — R74.8 ELEVATED CK: ICD-10-CM

## 2024-02-06 LAB
ALBUMIN SERPL-MCNC: 3.8 G/DL (ref 3.4–5)
ALBUMIN/GLOB SERPL: 1.2 (ref 0.8–1.7)
ALP SERPL-CCNC: 80 U/L (ref 45–117)
ALT SERPL-CCNC: 46 U/L (ref 13–56)
AMMONIA PLAS-SCNC: 20 UMOL/L (ref 11–32)
AMPHET UR QL SCN: NEGATIVE
ANION GAP SERPL CALC-SCNC: 7 MMOL/L (ref 3–18)
APPEARANCE UR: CLEAR
AST SERPL-CCNC: 67 U/L (ref 10–38)
BACTERIA URNS QL MICRO: ABNORMAL /HPF
BARBITURATES UR QL SCN: NEGATIVE
BASOPHILS # BLD: 0 K/UL (ref 0–0.1)
BASOPHILS NFR BLD: 0 % (ref 0–2)
BENZODIAZ UR QL: POSITIVE
BILIRUB SERPL-MCNC: 1.4 MG/DL (ref 0.2–1)
BILIRUB UR QL: NEGATIVE
BUN SERPL-MCNC: 17 MG/DL (ref 7–18)
BUN/CREAT SERPL: 19 (ref 12–20)
CALCIUM SERPL-MCNC: 10.1 MG/DL (ref 8.5–10.1)
CANNABINOIDS UR QL SCN: NEGATIVE
CHLORIDE SERPL-SCNC: 109 MMOL/L (ref 100–111)
CK SERPL-CCNC: 487 U/L (ref 26–192)
CO2 SERPL-SCNC: 29 MMOL/L (ref 21–32)
COCAINE UR QL SCN: NEGATIVE
COLOR UR: ABNORMAL
CREAT SERPL-MCNC: 0.88 MG/DL (ref 0.6–1.3)
DIFFERENTIAL METHOD BLD: ABNORMAL
EKG ATRIAL RATE: 65 BPM
EKG DIAGNOSIS: NORMAL
EKG P AXIS: 70 DEGREES
EKG P-R INTERVAL: 148 MS
EKG Q-T INTERVAL: 402 MS
EKG QRS DURATION: 100 MS
EKG QTC CALCULATION (BAZETT): 418 MS
EKG R AXIS: 37 DEGREES
EKG T AXIS: 74 DEGREES
EKG VENTRICULAR RATE: 65 BPM
EOSINOPHIL # BLD: 0 K/UL (ref 0–0.4)
EOSINOPHIL NFR BLD: 0 % (ref 0–5)
EPITH CASTS URNS QL MICRO: ABNORMAL /LPF (ref 0–5)
ERYTHROCYTE [DISTWIDTH] IN BLOOD BY AUTOMATED COUNT: 13.5 % (ref 11.6–14.5)
ETHANOL SERPL-MCNC: <3 MG/DL (ref 0–3)
GLOBULIN SER CALC-MCNC: 3.3 G/DL (ref 2–4)
GLUCOSE SERPL-MCNC: 151 MG/DL (ref 74–99)
GLUCOSE UR STRIP.AUTO-MCNC: NEGATIVE MG/DL
HCT VFR BLD AUTO: 34.8 % (ref 35–45)
HGB BLD-MCNC: 11.8 G/DL (ref 12–16)
HGB UR QL STRIP: NEGATIVE
IMM GRANULOCYTES # BLD AUTO: 0 K/UL (ref 0–0.04)
IMM GRANULOCYTES NFR BLD AUTO: 1 % (ref 0–0.5)
KETONES UR QL STRIP.AUTO: 40 MG/DL
LEUKOCYTE ESTERASE UR QL STRIP.AUTO: NEGATIVE
LYMPHOCYTES # BLD: 0.5 K/UL (ref 0.9–3.6)
LYMPHOCYTES NFR BLD: 7 % (ref 21–52)
Lab: ABNORMAL
MAGNESIUM SERPL-MCNC: 1.9 MG/DL (ref 1.6–2.6)
MCH RBC QN AUTO: 28.3 PG (ref 24–34)
MCHC RBC AUTO-ENTMCNC: 33.9 G/DL (ref 31–37)
MCV RBC AUTO: 83.5 FL (ref 78–100)
METHADONE UR QL: NEGATIVE
MONOCYTES # BLD: 0.4 K/UL (ref 0.05–1.2)
MONOCYTES NFR BLD: 6 % (ref 3–10)
NEUTS SEG # BLD: 6.3 K/UL (ref 1.8–8)
NEUTS SEG NFR BLD: 87 % (ref 40–73)
NITRITE UR QL STRIP.AUTO: NEGATIVE
NRBC # BLD: 0 K/UL (ref 0–0.01)
NRBC BLD-RTO: 0 PER 100 WBC
OPIATES UR QL: NEGATIVE
PCP UR QL: NEGATIVE
PH UR STRIP: 6.5 (ref 5–8)
PLATELET # BLD AUTO: 71 K/UL (ref 135–420)
PMV BLD AUTO: 10.6 FL (ref 9.2–11.8)
POTASSIUM SERPL-SCNC: 3.6 MMOL/L (ref 3.5–5.5)
PROT SERPL-MCNC: 7.1 G/DL (ref 6.4–8.2)
PROT UR STRIP-MCNC: 30 MG/DL
RBC # BLD AUTO: 4.17 M/UL (ref 4.2–5.3)
RBC #/AREA URNS HPF: ABNORMAL /HPF (ref 0–5)
SODIUM SERPL-SCNC: 145 MMOL/L (ref 136–145)
SP GR UR REFRACTOMETRY: 1.02 (ref 1–1.03)
UROBILINOGEN UR QL STRIP.AUTO: 1 EU/DL (ref 0.2–1)
WBC # BLD AUTO: 7.2 K/UL (ref 4.6–13.2)
WBC URNS QL MICRO: ABNORMAL /HPF (ref 0–5)

## 2024-02-06 PROCEDURE — 99285 EMERGENCY DEPT VISIT HI MDM: CPT

## 2024-02-06 PROCEDURE — 82077 ASSAY SPEC XCP UR&BREATH IA: CPT

## 2024-02-06 PROCEDURE — 80053 COMPREHEN METABOLIC PANEL: CPT

## 2024-02-06 PROCEDURE — 82550 ASSAY OF CK (CPK): CPT

## 2024-02-06 PROCEDURE — 85025 COMPLETE CBC W/AUTO DIFF WBC: CPT

## 2024-02-06 PROCEDURE — 6360000002 HC RX W HCPCS: Performed by: EMERGENCY MEDICINE

## 2024-02-06 PROCEDURE — 96361 HYDRATE IV INFUSION ADD-ON: CPT

## 2024-02-06 PROCEDURE — 80307 DRUG TEST PRSMV CHEM ANLYZR: CPT

## 2024-02-06 PROCEDURE — 70450 CT HEAD/BRAIN W/O DYE: CPT

## 2024-02-06 PROCEDURE — 83735 ASSAY OF MAGNESIUM: CPT

## 2024-02-06 PROCEDURE — 96374 THER/PROPH/DIAG INJ IV PUSH: CPT

## 2024-02-06 PROCEDURE — 2580000003 HC RX 258: Performed by: EMERGENCY MEDICINE

## 2024-02-06 PROCEDURE — 82140 ASSAY OF AMMONIA: CPT

## 2024-02-06 PROCEDURE — 81001 URINALYSIS AUTO W/SCOPE: CPT

## 2024-02-06 PROCEDURE — 71045 X-RAY EXAM CHEST 1 VIEW: CPT

## 2024-02-06 RX ORDER — ONDANSETRON 2 MG/ML
4 INJECTION INTRAMUSCULAR; INTRAVENOUS ONCE
Status: COMPLETED | OUTPATIENT
Start: 2024-02-06 | End: 2024-02-06

## 2024-02-06 RX ORDER — 0.9 % SODIUM CHLORIDE 0.9 %
1000 INTRAVENOUS SOLUTION INTRAVENOUS ONCE
Status: COMPLETED | OUTPATIENT
Start: 2024-02-06 | End: 2024-02-06

## 2024-02-06 RX ORDER — SODIUM CHLORIDE, SODIUM LACTATE, POTASSIUM CHLORIDE, AND CALCIUM CHLORIDE .6; .31; .03; .02 G/100ML; G/100ML; G/100ML; G/100ML
1000 INJECTION, SOLUTION INTRAVENOUS ONCE
Status: COMPLETED | OUTPATIENT
Start: 2024-02-06 | End: 2024-02-06

## 2024-02-06 RX ORDER — NALOXONE HYDROCHLORIDE 1 MG/ML
0.4 INJECTION INTRAMUSCULAR; INTRAVENOUS; SUBCUTANEOUS
Status: DISCONTINUED | OUTPATIENT
Start: 2024-02-06 | End: 2024-02-06 | Stop reason: HOSPADM

## 2024-02-06 RX ORDER — NALOXONE HYDROCHLORIDE 4 MG/.1ML
1 SPRAY NASAL PRN
Qty: 1 EACH | Refills: 0 | Status: SHIPPED | OUTPATIENT
Start: 2024-02-06

## 2024-02-06 RX ORDER — NALOXONE HYDROCHLORIDE 0.4 MG/ML
0.4 INJECTION, SOLUTION INTRAMUSCULAR; INTRAVENOUS; SUBCUTANEOUS
Status: DISCONTINUED | OUTPATIENT
Start: 2024-02-06 | End: 2024-02-06

## 2024-02-06 RX ADMIN — ONDANSETRON 4 MG: 2 INJECTION INTRAMUSCULAR; INTRAVENOUS at 12:11

## 2024-02-06 RX ADMIN — SODIUM CHLORIDE, POTASSIUM CHLORIDE, SODIUM LACTATE AND CALCIUM CHLORIDE 1000 ML: 600; 310; 30; 20 INJECTION, SOLUTION INTRAVENOUS at 13:58

## 2024-02-06 RX ADMIN — SODIUM CHLORIDE 1000 ML: 9 INJECTION, SOLUTION INTRAVENOUS at 12:12

## 2024-02-06 NOTE — ED TRIAGE NOTES
Pt arrives to ed per ems coming from home. Pt was found altered and drugs were found at the bedside. Pt alert to painful stimuli.

## 2024-02-06 NOTE — ED PROVIDER NOTES
[facility] EMERGENCY DEPT  EMERGENCY DEPARTMENT ENCOUNTER    Patient Name: Kimmy Garcia  MRN: [id number]  YOB: 1950  Provider: Dayron Adams MD  PCP: Sabrina Loyola MD   Time/Date of evaluation: 11:22 AM EST on 2/6/24    History of Presenting Illness     History Provided by: EMS  History is limited by: Confusion     HISTORY:   Kimmy Garcia is a 73 y.o. female presenting due to altered mental status.  She apparently has somebody who checks on her every couple of days.  She was found in bed altered.  She is alert and oriented x 1 and unable to provide any meaningful history.  She does have a history of polysubstance abuse. She has a known history of polysubstance abuse. EMS found drug paraphernalia at bedside which appeared to be cocaine related. She was altered on arrival and unable to provide any meaningful history.    Nursing Notes were all reviewed and agreed with or any disagreements were addressed in the HPI.    Past History     PAST MEDICAL HISTORY:  Past Medical History:   Diagnosis Date    Chronic back pain     Diabetes (HCC)     Drug abuse (HCC)     Hepatic cirrhosis (HCC)     Hepatitis C     Heroin abuse (HCC)     Pain management     Restless leg syndrome     Sciatica     Spleen enlarged     Thyroid disease        PAST SURGICAL HISTORY:  Past Surgical History:   Procedure Laterality Date    CHOLECYSTECTOMY      GYN      hysterectomy    HEENT      T&A    ORTHOPEDIC SURGERY      Bunion, left hand and right arm abscess removal       FAMILY HISTORY:  No family history on file.    SOCIAL HISTORY:  Social History     Tobacco Use    Smoking status: Every Day    Smokeless tobacco: Never   Substance Use Topics    Alcohol use: No    Drug use: Yes       MEDICATIONS:  Current Facility-Administered Medications   Medication Dose Route Frequency Provider Last Rate Last Admin    sodium chloride 0.9 % bolus 1,000 mL  1,000 mL IntraVENous Once Dayron Adams MD

## 2024-02-06 NOTE — PROGRESS NOTES
5:15 PM : Pt care transferred to me from Dr. Adams  ,ED provider. History of patient complaint(s), available diagnostic reports and current treatment plan has been discussed thoroughly.   Bedside rounding on patient occured : No .  Intended disposition of patient : TBD  Pending diagnostics reports and/or labs (please list):   ED Course as of 02/06/24 1715 Tue Feb 06, 2024 1712 Hx of polysubstance abuse, found down with drug paraphenalia, mild elevated CK, mental status slowly improving with IVF, remains unsteady, Benzo+, snorting \"Fentanyl,\" given Narcan []      ED Course User Index  [NAVDEEP] Gbae Shaikh, DO     Another 1 to 2 hours to reassess the patient's ability to walk on mental status.  If remains altered, unsteady, consider admission.    1807: Patient noted to be awake and alert, requesting to be discharged home, ambulating on her own.  Feel she is stable for discharge home after likely accidental fentanyl and benzodiazepine overdose.    Gabe Shaikh, DO  Emergency Physician  [facility] [facility] [employer]

## 2024-02-07 ENCOUNTER — HOSPITAL ENCOUNTER (EMERGENCY)
Facility: HOSPITAL | Age: 74
Discharge: HOME | End: 2024-02-07
Attending: EMERGENCY MEDICINE
Payer: MEDICARE

## 2024-02-07 VITALS
OXYGEN SATURATION: 94 % | RESPIRATION RATE: 19 BRPM | TEMPERATURE: 98.2 F | WEIGHT: 174 LBS | BODY MASS INDEX: 32.85 KG/M2 | DIASTOLIC BLOOD PRESSURE: 97 MMHG | SYSTOLIC BLOOD PRESSURE: 158 MMHG | HEIGHT: 61 IN | HEART RATE: 68 BPM

## 2024-02-07 DIAGNOSIS — T40.604A OPIATE OVERDOSE, UNDETERMINED INTENT, INITIAL ENCOUNTER (HCC): Primary | ICD-10-CM

## 2024-02-07 LAB
ALBUMIN SERPL-MCNC: 3.8 G/DL (ref 3.4–5)
ALBUMIN/GLOB SERPL: 1.2 (ref 0.8–1.7)
ALP SERPL-CCNC: 81 U/L (ref 45–117)
ALT SERPL-CCNC: 57 U/L (ref 13–56)
ANION GAP SERPL CALC-SCNC: 4 MMOL/L (ref 3–18)
AST SERPL-CCNC: 72 U/L (ref 10–38)
BASOPHILS # BLD: 0 K/UL (ref 0–0.1)
BASOPHILS NFR BLD: 0 % (ref 0–2)
BILIRUB SERPL-MCNC: 1.4 MG/DL (ref 0.2–1)
BUN SERPL-MCNC: 26 MG/DL (ref 7–18)
BUN/CREAT SERPL: 27 (ref 12–20)
CALCIUM SERPL-MCNC: 10.1 MG/DL (ref 8.5–10.1)
CHLORIDE SERPL-SCNC: 114 MMOL/L (ref 100–111)
CO2 SERPL-SCNC: 30 MMOL/L (ref 21–32)
CREAT SERPL-MCNC: 0.98 MG/DL (ref 0.6–1.3)
DIFFERENTIAL METHOD BLD: ABNORMAL
EKG ATRIAL RATE: 63 BPM
EKG DIAGNOSIS: NORMAL
EKG P AXIS: 70 DEGREES
EKG P-R INTERVAL: 150 MS
EKG Q-T INTERVAL: 422 MS
EKG QRS DURATION: 92 MS
EKG QTC CALCULATION (BAZETT): 431 MS
EKG R AXIS: 43 DEGREES
EKG T AXIS: 66 DEGREES
EKG VENTRICULAR RATE: 63 BPM
EOSINOPHIL # BLD: 0 K/UL (ref 0–0.4)
EOSINOPHIL NFR BLD: 0 % (ref 0–5)
ERYTHROCYTE [DISTWIDTH] IN BLOOD BY AUTOMATED COUNT: 13.8 % (ref 11.6–14.5)
ETHANOL SERPL-MCNC: <3 MG/DL (ref 0–3)
GLOBULIN SER CALC-MCNC: 3.1 G/DL (ref 2–4)
GLUCOSE BLD STRIP.AUTO-MCNC: 136 MG/DL (ref 70–110)
GLUCOSE SERPL-MCNC: 151 MG/DL (ref 74–99)
HCT VFR BLD AUTO: 35.1 % (ref 35–45)
HGB BLD-MCNC: 11.7 G/DL (ref 12–16)
IMM GRANULOCYTES # BLD AUTO: 0.1 K/UL (ref 0–0.04)
IMM GRANULOCYTES NFR BLD AUTO: 1 % (ref 0–0.5)
LYMPHOCYTES # BLD: 0.6 K/UL (ref 0.9–3.6)
LYMPHOCYTES NFR BLD: 13 % (ref 21–52)
MCH RBC QN AUTO: 28.2 PG (ref 24–34)
MCHC RBC AUTO-ENTMCNC: 33.3 G/DL (ref 31–37)
MCV RBC AUTO: 84.6 FL (ref 78–100)
MONOCYTES # BLD: 0.3 K/UL (ref 0.05–1.2)
MONOCYTES NFR BLD: 7 % (ref 3–10)
NEUTS SEG # BLD: 3.5 K/UL (ref 1.8–8)
NEUTS SEG NFR BLD: 78 % (ref 40–73)
NRBC # BLD: 0 K/UL (ref 0–0.01)
NRBC BLD-RTO: 0 PER 100 WBC
PLATELET # BLD AUTO: 70 K/UL (ref 135–420)
PMV BLD AUTO: 10.9 FL (ref 9.2–11.8)
POTASSIUM SERPL-SCNC: 3.5 MMOL/L (ref 3.5–5.5)
PROT SERPL-MCNC: 6.9 G/DL (ref 6.4–8.2)
RBC # BLD AUTO: 4.15 M/UL (ref 4.2–5.3)
SODIUM SERPL-SCNC: 148 MMOL/L (ref 136–145)
WBC # BLD AUTO: 4.5 K/UL (ref 4.6–13.2)

## 2024-02-07 PROCEDURE — 96374 THER/PROPH/DIAG INJ IV PUSH: CPT

## 2024-02-07 PROCEDURE — 82962 GLUCOSE BLOOD TEST: CPT

## 2024-02-07 PROCEDURE — 82077 ASSAY SPEC XCP UR&BREATH IA: CPT

## 2024-02-07 PROCEDURE — 80053 COMPREHEN METABOLIC PANEL: CPT

## 2024-02-07 PROCEDURE — 6360000002 HC RX W HCPCS: Performed by: EMERGENCY MEDICINE

## 2024-02-07 PROCEDURE — 85025 COMPLETE CBC W/AUTO DIFF WBC: CPT

## 2024-02-07 PROCEDURE — 99284 EMERGENCY DEPT VISIT MOD MDM: CPT

## 2024-02-07 RX ORDER — NALOXONE HYDROCHLORIDE 1 MG/ML
0.4 INJECTION INTRAMUSCULAR; INTRAVENOUS; SUBCUTANEOUS
Status: COMPLETED | OUTPATIENT
Start: 2024-02-07 | End: 2024-02-07

## 2024-02-07 RX ADMIN — NALOXONE HYDROCHLORIDE 0.4 MG: 1 INJECTION PARENTERAL at 15:02

## 2024-02-07 ASSESSMENT — PAIN - FUNCTIONAL ASSESSMENT: PAIN_FUNCTIONAL_ASSESSMENT: 0-10

## 2024-02-07 NOTE — DISCHARGE INSTRUCTIONS
Follow-up with your primary care doctor.  Return to the ED for worsening symptoms or for other concerns.

## 2024-02-07 NOTE — ED PROVIDER NOTES
[facility] EMERGENCY DEPT  EMERGENCY DEPARTMENT ENCOUNTER    Patient Name: Kimmy Garcia  MRN: [id number]  YOB: 1950  Provider: Robinson Gallardo MD  PCP: Sabrina Loyola MD   Time/Date of evaluation: 2:43 PM EST on 2/7/24    History of Presenting Illness     Chief Complaint   Patient presents with    Drug Overdose       History Provided by: EMS and Patient   History is limited by: Mental Status Change    HISTORY (Narative):   Kimmy Garcia is a 73 y.o. female with a PMHX of diabetes, drug abuse, cirrhosis, chronic back pain  who presents to the emergency department (room 10) by EMS C/O mental status changes onset today. Associated sxs include pinpoint pupils, slurred speech. Patient denies any other sxs or complaints.  Per EMS, patient was found altered again by her home health nurse.  She was in the ED yesterday for narcotic overdose.    Nursing Notes were all reviewed and agreed with or any disagreements were addressed in the HPI.    Past History     PAST MEDICAL HISTORY:  Past Medical History:   Diagnosis Date    Chronic back pain     Diabetes (HCC)     Drug abuse (HCC)     Hepatic cirrhosis (HCC)     Hepatitis C     Heroin abuse (HCC)     Pain management     Restless leg syndrome     Sciatica     Spleen enlarged     Thyroid disease        PAST SURGICAL HISTORY:  Past Surgical History:   Procedure Laterality Date    CHOLECYSTECTOMY      GYN      hysterectomy    HEENT      T&A    ORTHOPEDIC SURGERY      Bunion, left hand and right arm abscess removal       FAMILY HISTORY:  No family history on file.    SOCIAL HISTORY:  Social History     Tobacco Use    Smoking status: Every Day    Smokeless tobacco: Never   Substance Use Topics    Alcohol use: No    Drug use: Yes       MEDICATIONS:  No current facility-administered medications for this encounter.     Current Outpatient Medications   Medication Sig Dispense Refill    naloxone (NARCAN) 4 MG/0.1ML LIQD nasal spray 1 spray by Nasal  100-25 MCG/ACT AEPB Inhale 1 puff into the lungs daily      fluticasone-salmeterol (ADVAIR DISKUS) 250-50 MCG/ACT AEPB diskus inhaler Inhale 1 puff into the lungs every 12 hours      furosemide (LASIX) 20 MG tablet Take 10 mg by mouth daily      hydrOXYzine HCl (ATARAX) 50 MG tablet Take 50 mg by mouth 2 times daily as needed      levothyroxine (SYNTHROID) 50 MCG tablet Take 50 mcg by mouth every morning (before breakfast)      lurasidone (LATUDA) 40 MG TABS tablet Take 40 mg by mouth Daily with supper      methocarbamol (ROBAXIN) 500 MG tablet Take 500 mg by mouth every 6 hours as needed      !! naloxone (NARCAN) 4 MG/0.1ML LIQD nasal spray Use 1 spray intranasally, then discard. Repeat with new spray every 2 min as needed for opioid overdose symptoms, alternating nostrils.      omeprazole (PRILOSEC OTC) 20 MG tablet Take 20 mg by mouth 2 times daily      prazosin (MINIPRESS) 2 MG capsule Take 2 mg by mouth      predniSONE (DELTASONE) 5 MG tablet See administration instruction per 5mg dose pack      pregabalin (LYRICA) 75 MG capsule Take 75 mg by mouth 2 times daily.      rifAXIMin (XIFAXAN) 550 MG tablet Take 550 mg by mouth 2 times daily      rOPINIRole (REQUIP) 0.25 MG tablet Take 2 mg by mouth      spironolactone (ALDACTONE) 25 MG tablet Take by mouth daily      tiotropium (SPIRIVA RESPIMAT) 2.5 MCG/ACT AERS inhaler Inhale 2 puffs into the lungs daily      traZODone (DESYREL) 100 MG tablet Take 100 mg by mouth       !! - Potential duplicate medications found. Please discuss with provider.          DISCONTINUED MEDICATIONS:  Current Discharge Medication List          PATIENT REFERRED TO:  Follow Up with:  Sabrina Loyola MD    Schedule an appointment as soon as possible for a visit   With your PCP, As soon as possible, For follow up from the Emergency Department    [facility]  [facility], [address] [address] [place] [address] [phone])

## 2024-02-07 NOTE — ED TRIAGE NOTES
Pt BIBA for possible drug overdose. Per EMS, pt was found altered by home health nurse. Pt was in ER yesterday. Per EMS, pt possibly ingested pain medications and pt has pinpoint pupils at this time.

## 2024-02-07 NOTE — ED NOTES
Patient given discharge papers. Patient understanding of discharge. Patient ambulatory out of ED

## 2024-02-12 LAB
EKG ATRIAL RATE: 63 BPM
EKG ATRIAL RATE: 65 BPM
EKG DIAGNOSIS: NORMAL
EKG DIAGNOSIS: NORMAL
EKG P AXIS: 70 DEGREES
EKG P AXIS: 70 DEGREES
EKG P-R INTERVAL: 148 MS
EKG P-R INTERVAL: 150 MS
EKG Q-T INTERVAL: 402 MS
EKG Q-T INTERVAL: 422 MS
EKG QRS DURATION: 100 MS
EKG QRS DURATION: 92 MS
EKG QTC CALCULATION (BAZETT): 418 MS
EKG QTC CALCULATION (BAZETT): 431 MS
EKG R AXIS: 37 DEGREES
EKG R AXIS: 43 DEGREES
EKG T AXIS: 66 DEGREES
EKG T AXIS: 74 DEGREES
EKG VENTRICULAR RATE: 63 BPM
EKG VENTRICULAR RATE: 65 BPM

## 2024-02-13 NOTE — CONSULTS
Diet, exercise, portion control   [facility]  Pulmonary, Critical Care, and Sleep Medicine    Name: Yesi Lam MRN: [id number]   : 1950 Hospital: [facility] [place]   Date: 9/10/2020        [facility] Initial Patient Consult                                              Consult requesting physician: Boo Hurt MD    Reason for Consult: overdose methadone, abnormal ECG, change in mental status    [x] I have reviewed the flowsheet and previous days notes. Events, vitals, medications and notes from last 24 hours reviewed. Care plan discussed with staff, patient, family and on multidisciplinary rounds. Subjective:   9/10/2020     Patient Active Problem List   Diagnosis Code    Hypercarbia R06.89    Hypoxia R09.02    Methadone overdose ([facility] [address] T40.3X1A    LIONEL (acute kidney injury) ([facility] [address] N17.9    Prolonged Q-T interval on ECG R94.31    Bradycardia R00.1    Encephalopathy acute G93.40       IMPRESSION:   · Hypercarbic hypoxemic respiratory failure rated methadone withdrawal pain  · Patient has a history of asthma. · History of asthma start bronchodilators. · Overdose of methadone. · Abnormal EKG  · Encephalopathy  · Suicidal attempt  · Depression  · Previous suicidal attempts. ·         · Code status: full      RECOMMENDATIONS:   · CVS: Bradycardia related to overdose of methadone, given Narcan now improved. · Ativan and Precedex as needed. · Monitor EKG, electrolytes, EKG, troponins, echo  · Respiratory: Mild hypercarbia hypoxemia related to overdose of methadone improving now follow ABG, start bronchodilators patient he says history of asthma  · ID: No cough or fever or reported an illness recently follow white blood cells  · Hematology/Oncology: History of anemia follow-up hemoglobin  · Renal: Follow CPK renal function  · GI/: DVT GI prophylaxis  · Endocrine: Low glucose sliding scale insulin  · Neurology : CT of the head negative, now awake and alert.   · In withdrawal.  · On focal  · Pain/Sedation: Avoid opiates if in severe pain consider low-dose Percocet  · Musculoskeletal: Non  · Prophylaxis: GI Prophylaxis with PI DVT Prophylaxis with Lovenox  · Disposition: To remain ICU for 24 hours  ·                                                                      OTHER:   · Glycemic Control. Glucose stabilizer per ICU protocol when on insulin drip. Maintain blood glucose 140-180. · Replace electrolytes per ICU electrolyte replacement protocol  ·   · HOB >=30 degree elevation all the time. Aggressive pulmonary toileting. Incentive spirometry when appropriate. Aspiration precautions. · Sepsis bundle and protocol followed. Follow serial lactic acid when >2, frequent BMP check, fluid resuscitation. Draw cultures before antibiotic. Follow cultures. Antibiotic choice. Administer antibiotic within 1 hr ICU admission and 3 hours of ED arrival. Deescalate antibiotic when appropriate. · Vasopressor when appropriate with MAP goal >65 mmHg. · Central Line bundle followed, remove when not needed. Large bore IV line or CVP when appropriate. · Epstein bundle followed, remove epstein catheter when not critically ill. · Skin & Wound care. · Weekly prealbumin, nutritional consult. · PT/OT eval and treat. OOB/IS when appropriate. · Palliative care consult when appropriate. · Further recommendations will be based on the patient's response to recommended treatment and results of the investigation ordered. Quality Care: Stress ulcer prophylaxis, DVT prophylaxis, HOB elevated, Infection control all reviewed and addressed. Events and notes from last 24 hours reviewed. Care plan discussed with nursing, MDR  D/w patient above medical problems, labs, radiologic w/u, prognosis, code status, medication/procedure side effects/complications etc discussed, and answered all questions to their satisfaction. See my orders for detail.      CC TIME: 55 minutes of critical care time min      · Prior/Old records reviewed and discussed with patient. · Labs, Images and available PFT and sleep study discussed with patient. Labs and images personally seen and available reports reviewed with patient. · All current medicines are reviewed and doses and prescription adjusted. · Further management depending on test results and work up as outlined above. The patient is: [x] acutely ill Risk of deterioration: [] moderate    [x] critically ill  [x] high       History of Present Illness:     Toni Wolff has been seen and evaluated in ICU Patient is a 72 y.o. female with PMHx significant for heroin abuse currently on methadone, history of severe depression, history of previous suicidal attempt last psychiatric hospitalization 2018 in [facility], history of smoking and asthma, diabetes, hypertension. Patient was admitted to intensive care unit for observation and Narcan drip and abnormal EKG after she was found to be poorly responsive and overdose on methadone. EMS brought patient with change in mental status and after 1 dose of Narcan she started to slightly improve she was eventually started on a Narcan drip. She was found to have a bradycardia and prolonged QTC. CT of the head was normal.  Blood pressure stable. Patient eventually today in intensive care unit work-up. Follow command. And started to go through opiate withdrawal.  So methadone was stopped. She was given low dose of Ativan and low-dose of Precedex. Monitoring closely blood pressure EKG and electrolytes. We have consulted psychiatry. Possible another suicidal attempt. Patient needs to have a sitter. Psychiatry is advised to treat withdrawal and wait with resume doing her methadone at this point. Mild hypoxemia but no cough and fever no cough with contacts. GERD with known asthma on bronchodilators at home          The patient is critically ill and can not provide additional history due to Unable to comprehend.    History taken from ed//chart    Review of Systems:  A comprehensive review of systems was negative except for that written in the HPI. Medication Review:  · Pressors - none  · Sedation - ativan prn , precedex prn  · Antibiotics - none  · Pain - prn tylenol avoid opiates   · GI/ DVT - dvt/gi prophylaxis   · Others (other gtts) - ivf    Safety Bundles: VAP Bundle/ CAUTI/ Severe Sepsis Protocol/ Electrolyte Replacement Protocol      Allergies   Allergen Reactions    Erythromycin Hives    Penicillins Hives    Tetracycline Hives      Past Medical History:   Diagnosis Date    Chronic back pain     Diabetes ([facility] [address]     [facility] ([email or web address]     Hepatic cirrhosis (HCC)     Hepatitis C     Heroin abuse ([facility] [address]     Pain management     Restless leg syndrome     Sciatica     Spleen enlarged     Thyroid disease       Past Surgical History:   Procedure Laterality Date    HX CHOLECYSTECTOMY      HX GYN      hysterectomy    HX HEENT      T&A    HX ORTHOPAEDIC      Bunion, left hand and right arm abscess removal      Social History     Tobacco Use    Smoking status: Current Every Day Smoker    Smokeless tobacco: Never Used   Substance Use Topics    Alcohol use: No      History reviewed. No pertinent family history. Prior to Admission medications    Medication Sig Start Date End Date Taking? Authorizing Provider   traMADol (ULTRAM) 50 mg tablet Take 1 Tab by mouth every six (6) hours as needed for Pain. Max Daily Amount: 200 mg. 11/30/16   WESTLEY Murray   albuterol (PROVENTIL HFA, VENTOLIN HFA, PROAIR HFA) 90 mcg/actuation inhaler Take  by inhalation. Boo Hurt MD   benzonatate (TESSALON) 100 mg capsule Take 100 mg by mouth three (3) times daily as needed for Cough. Boo Hurt MD   carboxymethylcellulose sodium 1 % dlgl Apply  to eye. Boo Hurt MD   cycloSPORINE (RESTASIS) 0.05 % ophthalmic emulsion Administer 1 Drop to both eyes two (2) times a day.     Boo Hurt MD   guaiFENesin (ROBITUSSIN) 100 mg/5 mL liquid Take 200 mg by mouth three (3) times daily as needed for Cough. Boo Hurt MD   hydrOXYzine (VISTARIL) 25 mg capsule Take 25 mg by mouth three (3) times daily as needed for Itching. Boo Hurt MD   levothyroxine (SYNTHROID) 50 mcg tablet Take 50 mcg by mouth Daily (before breakfast). Boo Hurt MD   methylPREDNISolone (MEDROL) 4 mg tablet Take 4 mg by mouth daily (with breakfast). Boo Hurt MD   metroNIDAZOLE (FLAGYL) 250 mg tablet Take  by mouth three (3) times daily. Boo Hurt MD   nicotine (NICODERM CQ) 21 mg/24 hr 1 Patch by TransDERmal route every twenty-four (24) hours. Boo Hurt MD   omeprazole (PRILOSEC) 20 mg capsule Take 20 mg by mouth daily. Boo Hurt MD   rOPINIRole (REQUIP) 0.25 mg tablet Take 0.25 mg by mouth three (3) times daily. Boo Hurt MD   spironolactone (ALDACTONE) 25 mg tablet Take  by mouth daily. Boo Hurt MD   traZODone (DESYREL) 100 mg tablet Take 100 mg by mouth nightly. Boo Hurt MD   TOPIRAMATE (TOPAMAX PO) Take  by mouth.     Boo Hurt MD     Current Facility-Administered Medications   Medication Dose Route Frequency    naloxone (NARCAN) 10 mg in 0.9% sodium chloride 250 mL infusion  0.05-2 mg/hr IntraVENous TITRATE    nicotine (NICODERM CQ) 14 mg/24 hr patch 1 Patch  1 Patch TransDERmal DAILY    budesonide (PULMICORT) 500 mcg/2 ml nebulizer suspension  500 mcg Nebulization BID RT    albuterol-ipratropium (DUO-NEB) 2.5 MG-0.5 MG/3 ML  3 mL Nebulization TID RT    dexmedeTOMidine (PRECEDEX) 400 mcg in 0.9% sodium chloride 100 mL infusion  0.2-0.7 mcg/kg/hr IntraVENous TITRATE    sodium chloride (NS) flush 5-40 mL  5-40 mL IntraVENous Q8H    [Held by provider] enoxaparin (LOVENOX) injection 40 mg  40 mg SubCUTAneous DAILY    0.9% sodium chloride infusion  75 mL/hr IntraVENous CONTINUOUS    insulin lispro (HUMALOG) injection   SubCUTAneous Q6H    pantoprazole (PROTONIX) 40 mg in 0.9% sodium chloride 10 mL injection  40 mg IntraVENous Q24H Lines: All central lines examined by me. No signs of erythema, induration, discharge. Feeding Tube:                        Sheath:                          Infusion Wire/Catheter:      Peripherally Inserted Central Catheter:     Central Venous Catheter:     Venous Access Device:     Peripheral Intravenous Line:  Peripheral IV 20 Left Arm (Active)   Site Assessment Clean, dry, & intact 09/10/20 0400   Phlebitis Assessment 0 09/10/20 0400   Infiltration Assessment 0 09/10/20 0400   Dressing Status Clean, dry, & intact 09/10/20 0400   Dressing Type Transparent 09/10/20 0400   Hub Color/Line Status Blue 09/10/20 0400   Action Taken Blood drawn 20 1803   Alcohol Cap Used Yes 09/10/20 0400       Peripheral IV 20 Right Forearm (Active)   Site Assessment Clean, dry, & intact 09/10/20 0400   Phlebitis Assessment 0 09/10/20 0400   Infiltration Assessment 0 09/10/20 0400   Dressing Status Clean, dry, & intact 09/10/20 0400   Dressing Type Transparent 09/10/20 0400   Hub Color/Line Status Blue; Infusing 09/10/20 0400   Alcohol Cap Used Yes 09/10/20 0400                           Telemetry:normal sinus rhythm    Objective:   Vital Signs:    Visit Vitals  /64   Pulse (!) 54   Temp 99.2 °F (37.3 °C)   Resp 13   Ht 5' 2\" (1.575 m)   Wt 84.5 kg (186 lb 4.6 oz)   SpO2 99%   BMI 34.07 kg/m²       O2 Device: Nasal cannula   O2 Flow Rate (L/min): 2 l/min   Temp (24hrs), Av.5 °F (36.9 °C), Min:98 °F (36.7 °C), Max:99.2 °F (37.3 °C)       Intake/Output:   Last shift:      09/10 0701 - 09/10 1900  In: -   Out: 250 [Urine:250]    Last 3 shifts: 1901 - 09/10 0700  In: 1300.3 [I.V.:1300.3]  Out: -       Intake/Output Summary (Last 24 hours) at 9/10/2020 1009  Last data filed at 9/10/2020 0735  Gross per 24 hour   Intake 1300.25 ml   Output 250 ml   Net 1050.25 ml       Last 3 Recorded Weights in this Encounter    09/10/20 0123   Weight: 84.5 kg (186 lb 4.6 oz)       Ventilator Settings:  Mode Rate Tidal Volume Pressure FiO2 PEEP                    Peak airway pressure:      Plateau pressure:     Tidal volume:    Minute ventilation:     SPO2      ARDS network Guidelines: Lung protective strategy and Plateau pressure goals less than or equal to 30      Physical Exam:     General/Neurology: Alert, Awake, moderate distress agitated  Head:   Normocephalic, without obvious abnormality, atraumatic. Eye:   PERRL EOM intact no scleral icterus, no pallor, no cyanosis  Nose:   No sinus tenderness, no erythema, no induration, no discharge  Throat:  Lips, mucosa, and tongue normal. Teeth and gums normal. No tonsillar enlargement, no erythema, no exudates. No oral thrush  Neck:   Supple, symmetric. Thyroid: no enlargement/tenderness/nodule. No carotid bruit. No lymphadenopathy. Trachea midline  Back & spine: Symmetric, no curvature. Chest wall: No tenderness or deformity. No rash  Lung: Moderate air entry bilateral equal. No rales. No rhonchi. No wheezing. No stridors. No prolongdx expiration. No accessory muscle use. No dullness on percussion. Heart:   Regular rate & rhythm. S1 S2 present. No murmur. No gallop. No click. No rub. No JVD. Abdomen/: Soft. NT. ND. +BS. No masses. No organomegaly. Extremities:  No pedal edema. No cyanosis. No clubbing  Pulses: 2+ and symmetric in DP  Lymphatic:  No cervical, supraclavicular or axillary palpable lymphadenopathy. Musculoskeletal: No joint swelling. No tenderness.   Skin:   Color, texture, turgor normal. No rashes or lesions        Data:       Recent Results (from the past 24 hour(s))   URINALYSIS W/ RFLX MICROSCOPIC    Collection Time: 09/09/20  4:30 PM   Result Value Ref Range    Color DARK YELLOW      Appearance CLOUDY      Specific gravity 1.020 1.005 - 1.030      pH (UA) 6.0 5.0 - 8.0      Protein TRACE (A) NEG mg/dL    Glucose Negative NEG mg/dL    Ketone Negative NEG mg/dL    Bilirubin Negative NEG      Blood Negative NEG      Urobilinogen 1.0 0.2 - 1.0 EU/dL Nitrites Negative NEG      Leukocyte Esterase Negative NEG     DRUG SCREEN, URINE    Collection Time: 09/09/20  4:30 PM   Result Value Ref Range    BENZODIAZEPINES Negative NEG      BARBITURATES Negative NEG      THC (TH-CANNABINOL) Positive (A) NEG      OPIATES Negative NEG      PCP(PHENCYCLIDINE) Negative NEG      COCAINE Negative NEG      AMPHETAMINES Negative NEG      METHADONE Positive (A) NEG      HDSCOM (NOTE)    URINE MICROSCOPIC ONLY    Collection Time: 09/09/20  4:30 PM   Result Value Ref Range    WBC 0 to 3 0 - 5 /hpf    RBC 0 to 2 0 - 5 /hpf    Epithelial cells 2+ 0 - 5 /lpf    Bacteria FEW (A) NEG /hpf    Mucus FEW (A) NEG /lpf    Amorphous Crystals 1+ (A) NEG    Hyaline cast 0 to 3 0 - 2 /lpf   EKG, 12 LEAD, INITIAL    Collection Time: 09/09/20  5:08 PM   Result Value Ref Range    Ventricular Rate 69 BPM    Atrial Rate 69 BPM    P-R Interval 168 ms    QRS Duration 102 ms    Q-T Interval 440 ms    QTC Calculation (Bezet) 471 ms    Calculated P Axis 45 degrees    Calculated R Axis 19 degrees    Calculated T Axis 56 degrees    Diagnosis       Normal sinus rhythm  Prolonged QT  Abnormal ECG  When compared with ECG of 28-JUL-2011 11:33,  Nonspecific T wave abnormality, worse in Anterior leads     METABOLIC PANEL, COMPREHENSIVE    Collection Time: 09/09/20  5:10 PM   Result Value Ref Range    Sodium 137 136 - 145 mmol/L    Potassium 4.7 3.5 - 5.5 mmol/L    Chloride 101 100 - 111 mmol/L    CO2 30 21 - 32 mmol/L    Anion gap 6 3.0 - 18 mmol/L    Glucose 154 (H) 74 - 99 mg/dL    BUN 25 (H) 7.0 - 18 MG/DL    Creatinine 1.47 (H) 0.6 - 1.3 MG/DL    BUN/Creatinine ratio 17 12 - 20      GFR est AA 43 (L) >60 ml/min/1.73m2    GFR est non-AA 35 (L) >60 ml/min/1.73m2    Calcium 9.2 8.5 - 10.1 MG/DL    Bilirubin, total 0.4 0.2 - 1.0 MG/DL    ALT (SGPT) 29 13 - 56 U/L    AST (SGOT) 35 10 - 38 U/L    Alk.  phosphatase 128 (H) 45 - 117 U/L    Protein, total 6.6 6.4 - 8.2 g/dL    Albumin 3.3 (L) 3.4 - 5.0 g/dL    Globulin 3.3 2.0 - 4.0 g/dL    A-G Ratio 1.0 0.8 - 1.7     CARDIAC PANEL,(CK, CKMB & TROPONIN)    Collection Time: 09/09/20  5:10 PM   Result Value Ref Range    CK - MB 7.4 (H) <3.6 ng/ml    CK-MB Index 1.9 0.0 - 4.0 %     (H) 26 - 192 U/L    Troponin-I, QT <0.02 0.0 - 0.045 NG/ML   MAGNESIUM    Collection Time: 09/09/20  5:10 PM   Result Value Ref Range    Magnesium 2.0 1.6 - 2.6 mg/dL   CBC WITH AUTOMATED DIFF    Collection Time: 09/09/20  5:30 PM   Result Value Ref Range    WBC 2.2 (L) 4.6 - 13.2 K/uL    RBC 3.99 (L) 4.20 - 5.30 M/uL    HGB 10.8 (L) 12.0 - 16.0 g/dL    HCT 32.9 (L) 35.0 - 45.0 %    MCV 82.5 74.0 - 97.0 FL    MCH 27.1 24.0 - 34.0 PG    MCHC 32.8 31.0 - 37.0 g/dL    RDW 15.8 (H) 11.6 - 14.5 %    PLATELET 41 (L) 321 - 420 K/uL    MPV 9.9 9.2 - 11.8 FL    NEUTROPHILS 80 (H) 40 - 73 %    LYMPHOCYTES 13 (L) 21 - 52 %    MONOCYTES 7 3 - 10 %    EOSINOPHILS 0 0 - 5 %    BASOPHILS 0 0 - 2 %    ABS. NEUTROPHILS 1.8 1.8 - 8.0 K/UL    ABS. LYMPHOCYTES 0.3 (L) 0.9 - 3.6 K/UL    ABS. MONOCYTES 0.2 0.05 - 1.2 K/UL    ABS. EOSINOPHILS 0.0 0.0 - 0.4 K/UL    ABS. BASOPHILS 0.0 0.0 - 0.1 K/UL    DF AUTOMATED     POC G3    Collection Time: 09/09/20  5:40 PM   Result Value Ref Range    Device: ROOM AIR      pH (POC) 7.34 (L) 7.35 - 7.45      pCO2 (POC) 51.0 (H) 35.0 - 45.0 MMHG    pO2 (POC) 60 (L) 80 - 100 MMHG    HCO3 (POC) 27.2 (H) 22 - 26 MMOL/L    sO2 (POC) 88 (L) 92 - 97 %    Base excess (POC) 1 mmol/L    Allens test (POC) YES      Total resp.  rate 11      Site LEFT RADIAL      Specimen type (POC) ARTERIAL      Performed by Slim Acosta    AMMONIA    Collection Time: 09/09/20 10:00 PM   Result Value Ref Range    Ammonia 21 11 - 32 UMOL/L   GLUCOSE, POC    Collection Time: 09/10/20  1:45 AM   Result Value Ref Range    Glucose (POC) 97 70 - 110 mg/dL   CBC WITH AUTOMATED DIFF    Collection Time: 09/10/20  4:55 AM   Result Value Ref Range    WBC 2.3 (L) 4.6 - 13.2 K/uL    RBC 3.93 (L) 4.20 - 5.30 M/uL    HGB 10.5 (L) 12.0 - 16.0 g/dL    HCT 32.7 (L) 35.0 - 45.0 %    MCV 83.2 74.0 - 97.0 FL    MCH 26.7 24.0 - 34.0 PG    MCHC 32.1 31.0 - 37.0 g/dL    RDW 15.6 (H) 11.6 - 14.5 %    PLATELET 43 (L) 991 - 420 K/uL    MPV 9.8 9.2 - 11.8 FL    NEUTROPHILS 71 40 - 73 %    LYMPHOCYTES 20 (L) 21 - 52 %    MONOCYTES 9 3 - 10 %    EOSINOPHILS 0 0 - 5 %    BASOPHILS 0 0 - 2 %    ABS. NEUTROPHILS 1.6 (L) 1.8 - 8.0 K/UL    ABS. LYMPHOCYTES 0.5 (L) 0.9 - 3.6 K/UL    ABS. MONOCYTES 0.2 0.05 - 1.2 K/UL    ABS. EOSINOPHILS 0.0 0.0 - 0.4 K/UL    ABS. BASOPHILS 0.0 0.0 - 0.1 K/UL    DF AUTOMATED     MAGNESIUM    Collection Time: 09/10/20  4:55 AM   Result Value Ref Range    Magnesium 2.0 1.6 - 2.6 mg/dL   METABOLIC PANEL, COMPREHENSIVE    Collection Time: 09/10/20  4:55 AM   Result Value Ref Range    Sodium 138 136 - 145 mmol/L    Potassium 4.3 3.5 - 5.5 mmol/L    Chloride 108 100 - 111 mmol/L    CO2 27 21 - 32 mmol/L    Anion gap 3 3.0 - 18 mmol/L    Glucose 86 74 - 99 mg/dL    BUN 24 (H) 7.0 - 18 MG/DL    Creatinine 0.92 0.6 - 1.3 MG/DL    BUN/Creatinine ratio 26 (H) 12 - 20      GFR est AA >60 >60 ml/min/1.73m2    GFR est non-AA >60 >60 ml/min/1.73m2    Calcium 8.3 (L) 8.5 - 10.1 MG/DL    Bilirubin, total 0.6 0.2 - 1.0 MG/DL    ALT (SGPT) 38 13 - 56 U/L    AST (SGOT) 128 (H) 10 - 38 U/L    Alk. phosphatase 111 45 - 117 U/L    Protein, total 5.8 (L) 6.4 - 8.2 g/dL    Albumin 2.9 (L) 3.4 - 5.0 g/dL    Globulin 2.9 2.0 - 4.0 g/dL    A-G Ratio 1.0 0.8 - 1.7     GLUCOSE, POC    Collection Time: 09/10/20  6:01 AM   Result Value Ref Range    Glucose (POC) 94 70 - 110 mg/dL         Chemistry Recent Labs     09/10/20  0455 09/09/20  1710   GLU 86 154*    137   K 4.3 4.7    101   CO2 27 30   BUN 24* 25*   CREA 0.92 1.47*   CA 8.3* 9.2   MG 2.0 2.0   AGAP 3 6   BUCR 26* 17    128*   TP 5.8* 6.6   ALB 2.9* 3.3*   GLOB 2.9 3.3   AGRAT 1.0 1.0        Lactic Acid No results found for: LAC  No results for input(s): LAC in the last 72 hours. Liver Enzymes Protein, total   Date Value Ref Range Status   09/10/2020 5.8 (L) 6.4 - 8.2 g/dL Final     Albumin   Date Value Ref Range Status   09/10/2020 2.9 (L) 3.4 - 5.0 g/dL Final     Globulin   Date Value Ref Range Status   09/10/2020 2.9 2.0 - 4.0 g/dL Final     A-G Ratio   Date Value Ref Range Status   09/10/2020 1.0 0.8 - 1.7   Final     Alk. phosphatase   Date Value Ref Range Status   09/10/2020 111 45 - 117 U/L Final     Recent Labs     09/10/20  0455 09/09/20  1710   TP 5.8* 6.6   ALB 2.9* 3.3*   GLOB 2.9 3.3   AGRAT 1.0 1.0    128*        CBC w/Diff Recent Labs     09/10/20  0455 09/09/20  1730   WBC 2.3* 2.2*   RBC 3.93* 3.99*   HGB 10.5* 10.8*   HCT 32.7* 32.9*   PLT 43* 41*   GRANS 71 80*   LYMPH 20* 13*   EOS 0 0        Cardiac Enzymes Lab Results   Component Value Date/Time     (H) 09/09/2020 05:10 PM    CKMB 7.4 (H) 09/09/2020 05:10 PM    CKND1 1.9 09/09/2020 05:10 PM    TROIQ <0.02 09/09/2020 05:10 PM        BNP No results found for: BNP, BNPP, XBNPT     Coagulation No results for input(s): PTP, INR, APTT, INREXT in the last 72 hours.       Thyroid  Lab Results   Component Value Date/Time    TSH 1.79 02/12/2011 03:58 AM       No results found for: T4     Lipid Panel No results found for: CHOL, CHOLPOCT, CHOLX, CHLST, CHOLV, 774504, HDL, HDLP, LDL, LDLC, DLDLP, 415507, VLDLC, VLDL, TGLX, TRIGL, TRIGP, TGLPOCT, CHHD, CHHDX     ABG  Recent Labs     09/09/20  1740   PHI 7.34*   PCO2I 51.0*   PO2I 60*   HCO3I 27.2*        Urinalysis  Lab Results   Component Value Date/Time    Color DARK YELLOW 09/09/2020 04:30 PM    Appearance CLOUDY 09/09/2020 04:30 PM    Specific gravity 1.020 09/09/2020 04:30 PM    pH (UA) 6.0 09/09/2020 04:30 PM    Protein TRACE (A) 09/09/2020 04:30 PM    Glucose Negative 09/09/2020 04:30 PM    Ketone Negative 09/09/2020 04:30 PM    Bilirubin Negative 09/09/2020 04:30 PM    Urobilinogen 1.0 09/09/2020 04:30 PM    Nitrites Negative 09/09/2020 04:30 PM    Leukocyte Esterase Negative 09/09/2020 04:30 PM    Epithelial cells 2+ 09/09/2020 04:30 PM    Bacteria FEW (A) 09/09/2020 04:30 PM    WBC 0 to 3 09/09/2020 04:30 PM    RBC 0 to 2 09/09/2020 04:30 PM        Micro   No results for input(s): SDES, CULT in the last 72 hours. No results for input(s): CULT in the last 72 hours. EKG  No results found for this or any previous visit. PFT  No flowsheet data found. Other  ASA reactivity:   Pre-albumin:   Ionized Calcium:   NH4:   T3, FT4:  Cortisol:  Urine Osm:  Urine Lytes:   HbA1c:      Ultrasound       LE Doppler       ECHO       CT (Most Recent)  Results from Hospital Encounter encounter on 09/09/20   CT HEAD WO CONT    Narrative EXAM: CT HEAD WO CONT    CLINICAL INDICATION/HISTORY: ams    COMPARISON: None. TECHNIQUE: Axial CT imaging of the head was performed without intravenous  contrast. Sagittal and coronal reconstructions are provided. One or more dose  reduction techniques were used on this CT: automated exposure control,  adjustment of the mAs and/or kVp according to patient size, and iterative  reconstruction techniques. The specific techniques used on this CT exam have  been documented in the patient's electronic medical record. Digital Imaging and  Communications in Medicine (DICOM) format image data are available to  nonaffiliated external healthcare facilities or entities on a secure, media  free, reciprocally searchable basis with patient authorization for at least a  12-month period after this study      _______________    FINDINGS:    BRAIN AND POSTERIOR FOSSA: The sulci, folia, ventricles and basal cisterns are  within normal limits for the patient's age. There is no intracranial hemorrhage,  mass effect, or midline shift. There are scattered and confluent foci of decreased attenuation in the  periventricular white matter which are nonspecific but most likely sequelae of  chronic microvascular disease.     EXTRA-AXIAL SPACES AND MENINGES: There are no abnormal extra-axial fluid  collections. CALVARIUM: Intact. SINUSES: Clear. OTHER: Prior bilateral lens replacements.    _______________      Impression IMPRESSION:    No acute intracranial abnormalities. XR (Most Recent). CXR reviewed by me and compared with previous CXR Results from Hospital Encounter encounter on 09/09/20   XR CHEST PORT    Narrative EXAM: XR CHEST PORT    CLINICAL INDICATION/HISTORY: OD    > Additional: Altered mental status    COMPARISON: Correlation with rib series dated August 23, 2020    TECHNIQUE: Portable chest    _______________    FINDINGS:    SUPPORT DEVICES: None. HEART AND MEDIASTINUM: Normal size and contour. Normal pulmonary vasculature. LUNGS AND PLEURAL SPACES: Thin band of subsegmental atelectasis or scarring at  the right lung base, unchanged. The lungs are otherwise well expanded and clear. No focal consolidation, effusion, or pneumothorax. BONY THORAX AND SOFT TISSUES: No acute osseous abnormality. _______________      Impression IMPRESSION:    No active cardiopulmonary disease. High complexity decision making was performed during the evaluation of this patient at high risk for decompensation with multiple organ involvement    [x] Above mentioned total time spent on reviewing the case/medical record/data/notes/EMR/patient examination/documentation/coordinating care with nurse/consultants, exclusive of procedures with complex decision making performed and > 50% time spent in face to face evaluation.     Pablo Kurtz MD  9/10/2020

## 2024-05-23 ENCOUNTER — HOSPITAL ENCOUNTER (OUTPATIENT)
Facility: HOSPITAL | Age: 74
Discharge: HOME | End: 2024-05-25
Payer: OTHER GOVERNMENT

## 2024-05-23 VITALS
SYSTOLIC BLOOD PRESSURE: 164 MMHG | BODY MASS INDEX: 32.85 KG/M2 | WEIGHT: 174 LBS | DIASTOLIC BLOOD PRESSURE: 85 MMHG | HEART RATE: 70 BPM | HEIGHT: 61 IN

## 2024-05-23 DIAGNOSIS — R06.02 SHORTNESS OF BREATH: ICD-10-CM

## 2024-05-23 LAB
ECHO AO ROOT DIAM: 3.1 CM
ECHO AO ROOT INDEX: 1.74 CM/M2
ECHO AR MAX VEL PISA: 4.6 M/S
ECHO AV AREA PEAK VELOCITY: 2.2 CM2
ECHO AV AREA VTI: 2.2 CM2
ECHO AV AREA/BSA PEAK VELOCITY: 1.2 CM2/M2
ECHO AV AREA/BSA VTI: 1.2 CM2/M2
ECHO AV MEAN GRADIENT: 5 MMHG
ECHO AV MEAN VELOCITY: 1 M/S
ECHO AV PEAK GRADIENT: 8 MMHG
ECHO AV PEAK VELOCITY: 1.4 M/S
ECHO AV REGURGITANT PHT: 630.6 MILLISECOND
ECHO AV VELOCITY RATIO: 0.71
ECHO AV VTI: 30.6 CM
ECHO BSA: 1.84 M2
ECHO EST RA PRESSURE: 3 MMHG
ECHO LA DIAMETER INDEX: 1.8 CM/M2
ECHO LA DIAMETER: 3.2 CM
ECHO LA TO AORTIC ROOT RATIO: 1.03
ECHO LA VOL A-L A2C: 50 ML (ref 22–52)
ECHO LA VOL A-L A4C: 53 ML (ref 22–52)
ECHO LA VOL BP: 53 ML (ref 22–52)
ECHO LA VOL MOD A2C: 49 ML (ref 22–52)
ECHO LA VOL MOD A4C: 50 ML (ref 22–52)
ECHO LA VOL/BSA BIPLANE: 30 ML/M2 (ref 16–34)
ECHO LA VOLUME AREA LENGTH: 57 ML
ECHO LA VOLUME INDEX A-L A2C: 28 ML/M2 (ref 16–34)
ECHO LA VOLUME INDEX A-L A4C: 30 ML/M2 (ref 16–34)
ECHO LA VOLUME INDEX AREA LENGTH: 32 ML/M2 (ref 16–34)
ECHO LA VOLUME INDEX MOD A2C: 28 ML/M2 (ref 16–34)
ECHO LA VOLUME INDEX MOD A4C: 28 ML/M2 (ref 16–34)
ECHO LV E' LATERAL VELOCITY: 4 CM/S
ECHO LV E' SEPTAL VELOCITY: 4 CM/S
ECHO LV EDV A2C: 69 ML
ECHO LV EDV A4C: 87 ML
ECHO LV EDV BP: 78 ML (ref 56–104)
ECHO LV EDV INDEX A4C: 49 ML/M2
ECHO LV EDV INDEX BP: 44 ML/M2
ECHO LV EDV NDEX A2C: 39 ML/M2
ECHO LV EJECTION FRACTION A2C: 58 %
ECHO LV EJECTION FRACTION A4C: 57 %
ECHO LV EJECTION FRACTION BIPLANE: 55 % (ref 55–100)
ECHO LV ESV A2C: 29 ML
ECHO LV ESV A4C: 38 ML
ECHO LV ESV BP: 35 ML (ref 19–49)
ECHO LV ESV INDEX A2C: 16 ML/M2
ECHO LV ESV INDEX A4C: 21 ML/M2
ECHO LV ESV INDEX BP: 20 ML/M2
ECHO LV FRACTIONAL SHORTENING: 33 % (ref 28–44)
ECHO LV INTERNAL DIMENSION DIASTOLE INDEX: 2.75 CM/M2
ECHO LV INTERNAL DIMENSION DIASTOLIC: 4.9 CM (ref 3.9–5.3)
ECHO LV INTERNAL DIMENSION SYSTOLIC INDEX: 1.85 CM/M2
ECHO LV INTERNAL DIMENSION SYSTOLIC: 3.3 CM
ECHO LV IVSD: 0.9 CM (ref 0.6–0.9)
ECHO LV MASS 2D: 153 G (ref 67–162)
ECHO LV MASS INDEX 2D: 85.9 G/M2 (ref 43–95)
ECHO LV POSTERIOR WALL DIASTOLIC: 0.9 CM (ref 0.6–0.9)
ECHO LV RELATIVE WALL THICKNESS RATIO: 0.37
ECHO LVOT AREA: 3.1 CM2
ECHO LVOT AV VTI INDEX: 0.69
ECHO LVOT DIAM: 2 CM
ECHO LVOT MEAN GRADIENT: 2 MMHG
ECHO LVOT PEAK GRADIENT: 4 MMHG
ECHO LVOT PEAK VELOCITY: 1 M/S
ECHO LVOT STROKE VOLUME INDEX: 37 ML/M2
ECHO LVOT SV: 65.9 ML
ECHO LVOT VTI: 21 CM
ECHO MV A VELOCITY: 0.77 M/S
ECHO MV E DECELERATION TIME (DT): 539.9 MS
ECHO MV E VELOCITY: 0.37 M/S
ECHO MV E/A RATIO: 0.48
ECHO MV E/E' LATERAL: 9.25
ECHO MV E/E' RATIO (AVERAGED): 9.25
ECHO MV E/E' SEPTAL: 9.25
ECHO RA END SYSTOLIC VOLUME APICAL 4 CHAMBER INDEX BSA: 18 ML/M2
ECHO RA VOLUME: 32 ML
ECHO RIGHT VENTRICULAR SYSTOLIC PRESSURE (RVSP): 26 MMHG
ECHO RV FREE WALL PEAK S': 12 CM/S
ECHO RV INTERNAL DIMENSION: 2.2 CM
ECHO RV TAPSE: 1.7 CM (ref 1.7–?)
ECHO TV REGURGITANT MAX VELOCITY: 2.4 M/S
ECHO TV REGURGITANT PEAK GRADIENT: 23 MMHG

## 2024-05-23 PROCEDURE — 93306 TTE W/DOPPLER COMPLETE: CPT

## 2024-06-17 ENCOUNTER — HOSPITAL ENCOUNTER (OUTPATIENT)
Facility: HOSPITAL | Age: 74
Discharge: HOME | End: 2024-06-20
Payer: OTHER GOVERNMENT

## 2024-06-17 DIAGNOSIS — Z01.818 PRE-OP TESTING: Primary | ICD-10-CM

## 2024-06-17 LAB
ALBUMIN SERPL-MCNC: 3.5 G/DL (ref 3.4–5)
ALBUMIN/GLOB SERPL: 1.1 (ref 0.8–1.7)
ALP SERPL-CCNC: 85 U/L (ref 45–117)
ALT SERPL-CCNC: 26 U/L (ref 13–56)
ANION GAP SERPL CALC-SCNC: 5 MMOL/L (ref 3–18)
APPEARANCE UR: ABNORMAL
APTT PPP: 24.4 SEC (ref 23–36.4)
AST SERPL-CCNC: 30 U/L (ref 10–38)
BACTERIA URNS QL MICRO: ABNORMAL /HPF
BASOPHILS # BLD: 0 K/UL (ref 0–0.1)
BASOPHILS NFR BLD: 0 % (ref 0–2)
BILIRUB SERPL-MCNC: 0.7 MG/DL (ref 0.2–1)
BILIRUB UR QL: NEGATIVE
BUN SERPL-MCNC: 15 MG/DL (ref 7–18)
BUN/CREAT SERPL: 11 (ref 12–20)
CALCIUM SERPL-MCNC: 9.5 MG/DL (ref 8.5–10.1)
CHLORIDE SERPL-SCNC: 102 MMOL/L (ref 100–111)
CO2 SERPL-SCNC: 29 MMOL/L (ref 21–32)
COLOR UR: ABNORMAL
CREAT SERPL-MCNC: 1.34 MG/DL (ref 0.6–1.3)
DIFFERENTIAL METHOD BLD: ABNORMAL
EKG ATRIAL RATE: 73 BPM
EKG DIAGNOSIS: NORMAL
EKG P AXIS: 16 DEGREES
EKG P-R INTERVAL: 164 MS
EKG Q-T INTERVAL: 406 MS
EKG QRS DURATION: 106 MS
EKG QTC CALCULATION (BAZETT): 447 MS
EKG R AXIS: 74 DEGREES
EKG T AXIS: 111 DEGREES
EKG VENTRICULAR RATE: 73 BPM
EOSINOPHIL # BLD: 0.1 K/UL (ref 0–0.4)
EOSINOPHIL NFR BLD: 3 % (ref 0–5)
EPITH CASTS URNS QL MICRO: ABNORMAL /LPF (ref 0–5)
ERYTHROCYTE [DISTWIDTH] IN BLOOD BY AUTOMATED COUNT: 14.6 % (ref 11.6–14.5)
EST. AVERAGE GLUCOSE BLD GHB EST-MCNC: 94 MG/DL
GLOBULIN SER CALC-MCNC: 3.1 G/DL (ref 2–4)
GLUCOSE SERPL-MCNC: 190 MG/DL (ref 74–99)
GLUCOSE UR STRIP.AUTO-MCNC: NEGATIVE MG/DL
HBA1C MFR BLD: 4.9 % (ref 4.2–5.6)
HCT VFR BLD AUTO: 37.8 % (ref 35–45)
HGB BLD-MCNC: 12.4 G/DL (ref 12–16)
HGB UR QL STRIP: NEGATIVE
IMM GRANULOCYTES # BLD AUTO: 0 K/UL (ref 0–0.04)
IMM GRANULOCYTES NFR BLD AUTO: 0 % (ref 0–0.5)
INR PPP: 1.2 (ref 0.9–1.1)
KETONES UR QL STRIP.AUTO: ABNORMAL MG/DL
LEUKOCYTE ESTERASE UR QL STRIP.AUTO: ABNORMAL
LYMPHOCYTES # BLD: 0.6 K/UL (ref 0.9–3.6)
LYMPHOCYTES NFR BLD: 26 % (ref 21–52)
MCH RBC QN AUTO: 27.7 PG (ref 24–34)
MCHC RBC AUTO-ENTMCNC: 32.8 G/DL (ref 31–37)
MCV RBC AUTO: 84.4 FL (ref 78–100)
MONOCYTES # BLD: 0.2 K/UL (ref 0.05–1.2)
MONOCYTES NFR BLD: 9 % (ref 3–10)
NEUTS SEG # BLD: 1.4 K/UL (ref 1.8–8)
NEUTS SEG NFR BLD: 61 % (ref 40–73)
NITRITE UR QL STRIP.AUTO: POSITIVE
NRBC # BLD: 0 K/UL (ref 0–0.01)
NRBC BLD-RTO: 0 PER 100 WBC
OTHER: ABNORMAL
PH UR STRIP: 5.5 (ref 5–8)
PLATELET # BLD AUTO: 51 K/UL (ref 135–420)
PMV BLD AUTO: 11.7 FL (ref 9.2–11.8)
POTASSIUM SERPL-SCNC: 4 MMOL/L (ref 3.5–5.5)
PROT SERPL-MCNC: 6.6 G/DL (ref 6.4–8.2)
PROT UR STRIP-MCNC: ABNORMAL MG/DL
PROTHROMBIN TIME: 15.9 SEC (ref 11.9–14.9)
RBC # BLD AUTO: 4.48 M/UL (ref 4.2–5.3)
RBC #/AREA URNS HPF: ABNORMAL /HPF (ref 0–5)
SODIUM SERPL-SCNC: 136 MMOL/L (ref 136–145)
SP GR UR REFRACTOMETRY: 1.02 (ref 1–1.03)
UROBILINOGEN UR QL STRIP.AUTO: 1 EU/DL (ref 0.2–1)
WBC # BLD AUTO: 2.3 K/UL (ref 4.6–13.2)
WBC URNS QL MICRO: ABNORMAL /HPF (ref 0–5)

## 2024-06-17 PROCEDURE — 87086 URINE CULTURE/COLONY COUNT: CPT

## 2024-06-17 PROCEDURE — 87186 SC STD MICRODIL/AGAR DIL: CPT

## 2024-06-17 PROCEDURE — 87077 CULTURE AEROBIC IDENTIFY: CPT

## 2024-06-17 PROCEDURE — 85730 THROMBOPLASTIN TIME PARTIAL: CPT

## 2024-06-17 PROCEDURE — 85025 COMPLETE CBC W/AUTO DIFF WBC: CPT

## 2024-06-17 PROCEDURE — 81001 URINALYSIS AUTO W/SCOPE: CPT

## 2024-06-17 PROCEDURE — 93005 ELECTROCARDIOGRAM TRACING: CPT | Performed by: ORTHOPAEDIC SURGERY

## 2024-06-17 PROCEDURE — 85610 PROTHROMBIN TIME: CPT

## 2024-06-17 PROCEDURE — 80053 COMPREHEN METABOLIC PANEL: CPT

## 2024-06-17 PROCEDURE — 83036 HEMOGLOBIN GLYCOSYLATED A1C: CPT

## 2024-06-17 NOTE — PERIOP NOTE
MRSA, Urine (UA and Culture), and Blood specimen for CMP, CBC w/diff, HgA1c and Pt/Ptt sent to lab.

## 2024-06-18 LAB
BACTERIA SPEC CULT: NORMAL
BACTERIA SPEC CULT: NORMAL
SERVICE CMNT-IMP: NORMAL

## 2024-06-19 LAB
BACTERIA SPEC CULT: ABNORMAL
CC UR VC: ABNORMAL
SERVICE CMNT-IMP: ABNORMAL

## 2025-06-30 ENCOUNTER — HOSPITAL ENCOUNTER (EMERGENCY)
Facility: HOSPITAL | Age: 75
Discharge: HOME | End: 2025-06-30
Attending: EMERGENCY MEDICINE
Payer: OTHER GOVERNMENT

## 2025-06-30 ENCOUNTER — APPOINTMENT (OUTPATIENT)
Facility: HOSPITAL | Age: 75
End: 2025-06-30
Payer: OTHER GOVERNMENT

## 2025-06-30 VITALS
SYSTOLIC BLOOD PRESSURE: 120 MMHG | HEIGHT: 61 IN | TEMPERATURE: 98.6 F | BODY MASS INDEX: 30.78 KG/M2 | DIASTOLIC BLOOD PRESSURE: 61 MMHG | HEART RATE: 57 BPM | WEIGHT: 163 LBS | RESPIRATION RATE: 11 BRPM

## 2025-06-30 DIAGNOSIS — R07.89 ATYPICAL CHEST PAIN: Primary | ICD-10-CM

## 2025-06-30 DIAGNOSIS — R79.89 ELEVATED TROPONIN: ICD-10-CM

## 2025-06-30 LAB
ALBUMIN SERPL-MCNC: 3.1 G/DL (ref 3.4–5)
ALBUMIN/GLOB SERPL: 0.9 (ref 0.8–1.7)
ALP SERPL-CCNC: 105 U/L (ref 45–117)
ALT SERPL-CCNC: 66 U/L (ref 10–35)
ANION GAP SERPL CALC-SCNC: 10 MMOL/L (ref 3–18)
AST SERPL-CCNC: 71 U/L (ref 10–38)
BASOPHILS # BLD: 0.01 K/UL (ref 0–0.1)
BASOPHILS NFR BLD: 0.4 % (ref 0–2)
BILIRUB SERPL-MCNC: 0.6 MG/DL (ref 0.2–1)
BUN SERPL-MCNC: 19 MG/DL (ref 6–23)
BUN/CREAT SERPL: 16 (ref 12–20)
CALCIUM SERPL-MCNC: 9.7 MG/DL (ref 8.5–10.1)
CHLORIDE SERPL-SCNC: 98 MMOL/L (ref 98–107)
CO2 SERPL-SCNC: 29 MMOL/L (ref 21–32)
CREAT SERPL-MCNC: 1.19 MG/DL (ref 0.6–1.3)
DIFFERENTIAL METHOD BLD: ABNORMAL
EKG ATRIAL RATE: 73 BPM
EKG DIAGNOSIS: NORMAL
EKG P AXIS: 7 DEGREES
EKG P-R INTERVAL: 166 MS
EKG Q-T INTERVAL: 396 MS
EKG QRS DURATION: 100 MS
EKG QTC CALCULATION (BAZETT): 436 MS
EKG R AXIS: -9 DEGREES
EKG T AXIS: 47 DEGREES
EKG VENTRICULAR RATE: 73 BPM
EOSINOPHIL # BLD: 0.05 K/UL (ref 0–0.4)
EOSINOPHIL NFR BLD: 2.2 % (ref 0–5)
ERYTHROCYTE [DISTWIDTH] IN BLOOD BY AUTOMATED COUNT: 16.1 % (ref 11.6–14.5)
GLOBULIN SER CALC-MCNC: 3.5 G/DL (ref 2–4)
GLUCOSE SERPL-MCNC: 145 MG/DL (ref 74–108)
HCT VFR BLD AUTO: 30.2 % (ref 35–45)
HGB BLD-MCNC: 9.8 G/DL (ref 12–16)
IMM GRANULOCYTES # BLD AUTO: 0.01 K/UL (ref 0–0.04)
IMM GRANULOCYTES NFR BLD AUTO: 0.4 % (ref 0–0.5)
LYMPHOCYTES # BLD: 0.49 K/UL (ref 0.9–3.3)
LYMPHOCYTES NFR BLD: 21.2 % (ref 21–52)
MCH RBC QN AUTO: 25.7 PG (ref 24–34)
MCHC RBC AUTO-ENTMCNC: 32.5 G/DL (ref 31–37)
MCV RBC AUTO: 79.3 FL (ref 78–100)
MONOCYTES # BLD: 0.2 K/UL (ref 0.05–1.2)
MONOCYTES NFR BLD: 8.7 % (ref 3–10)
NEUTS SEG # BLD: 1.55 K/UL (ref 1.8–8)
NEUTS SEG NFR BLD: 67.1 % (ref 40–73)
NRBC # BLD: 0 K/UL (ref 0–0.01)
NRBC BLD-RTO: 0 PER 100 WBC
PLATELET # BLD AUTO: 77 K/UL (ref 135–420)
PMV BLD AUTO: 10.3 FL (ref 9.2–11.8)
POTASSIUM SERPL-SCNC: 4.2 MMOL/L (ref 3.5–5.5)
PROT SERPL-MCNC: 6.6 G/DL (ref 6.4–8.2)
RBC # BLD AUTO: 3.81 M/UL (ref 4.2–5.3)
SODIUM SERPL-SCNC: 137 MMOL/L (ref 136–145)
TROPONIN T SERPL HS-MCNC: 52.8 NG/L (ref 0–14)
TROPONIN T SERPL HS-MCNC: 53.5 NG/L (ref 0–14)
TROPONIN T SERPL HS-MCNC: 62.8 NG/L (ref 0–14)
WBC # BLD AUTO: 2.3 K/UL (ref 4.6–13.2)

## 2025-06-30 PROCEDURE — 2580000003 HC RX 258: Performed by: EMERGENCY MEDICINE

## 2025-06-30 PROCEDURE — 71046 X-RAY EXAM CHEST 2 VIEWS: CPT

## 2025-06-30 PROCEDURE — 80053 COMPREHEN METABOLIC PANEL: CPT

## 2025-06-30 PROCEDURE — 6360000004 HC RX CONTRAST MEDICATION: Performed by: EMERGENCY MEDICINE

## 2025-06-30 PROCEDURE — 96375 TX/PRO/DX INJ NEW DRUG ADDON: CPT

## 2025-06-30 PROCEDURE — 84484 ASSAY OF TROPONIN QUANT: CPT

## 2025-06-30 PROCEDURE — 85025 COMPLETE CBC W/AUTO DIFF WBC: CPT

## 2025-06-30 PROCEDURE — 6360000002 HC RX W HCPCS: Performed by: EMERGENCY MEDICINE

## 2025-06-30 PROCEDURE — 71275 CT ANGIOGRAPHY CHEST: CPT

## 2025-06-30 PROCEDURE — 99285 EMERGENCY DEPT VISIT HI MDM: CPT

## 2025-06-30 PROCEDURE — 96374 THER/PROPH/DIAG INJ IV PUSH: CPT

## 2025-06-30 PROCEDURE — 93005 ELECTROCARDIOGRAM TRACING: CPT | Performed by: EMERGENCY MEDICINE

## 2025-06-30 RX ORDER — MORPHINE SULFATE 4 MG/ML
4 INJECTION, SOLUTION INTRAMUSCULAR; INTRAVENOUS
Status: COMPLETED | OUTPATIENT
Start: 2025-06-30 | End: 2025-06-30

## 2025-06-30 RX ORDER — ONDANSETRON 2 MG/ML
4 INJECTION INTRAMUSCULAR; INTRAVENOUS
Status: COMPLETED | OUTPATIENT
Start: 2025-06-30 | End: 2025-06-30

## 2025-06-30 RX ORDER — IOPAMIDOL 755 MG/ML
100 INJECTION, SOLUTION INTRAVASCULAR
Status: COMPLETED | OUTPATIENT
Start: 2025-06-30 | End: 2025-06-30

## 2025-06-30 RX ORDER — 0.9 % SODIUM CHLORIDE 0.9 %
500 INTRAVENOUS SOLUTION INTRAVENOUS ONCE
Status: COMPLETED | OUTPATIENT
Start: 2025-06-30 | End: 2025-06-30

## 2025-06-30 RX ADMIN — IOPAMIDOL 90 ML: 755 INJECTION, SOLUTION INTRAVENOUS at 12:52

## 2025-06-30 RX ADMIN — SODIUM CHLORIDE 500 ML: 0.9 INJECTION, SOLUTION INTRAVENOUS at 12:22

## 2025-06-30 RX ADMIN — MORPHINE SULFATE 4 MG: 4 INJECTION, SOLUTION INTRAMUSCULAR; INTRAVENOUS at 14:02

## 2025-06-30 RX ADMIN — SODIUM CHLORIDE 500 ML: 0.9 INJECTION, SOLUTION INTRAVENOUS at 14:02

## 2025-06-30 RX ADMIN — ONDANSETRON 4 MG: 2 INJECTION, SOLUTION INTRAMUSCULAR; INTRAVENOUS at 14:02

## 2025-06-30 ASSESSMENT — PAIN SCALES - GENERAL: PAINLEVEL_OUTOF10: 8

## 2025-06-30 ASSESSMENT — PAIN DESCRIPTION - LOCATION: LOCATION: CHEST

## 2025-06-30 ASSESSMENT — PAIN - FUNCTIONAL ASSESSMENT: PAIN_FUNCTIONAL_ASSESSMENT: 0-10

## 2025-06-30 NOTE — ED TRIAGE NOTES
Pt arrived via EMS from [facility] c/o CP, nausea, SOB since 6/24/25. Pt had a surgery on 6/24/25 at [facility]. Pt has hx of liver cancer.     Past Medical History:   Diagnosis Date    Chronic back pain     Sciatica, neck & right knee. uses licocaine patch & cortisone injection    COPD (chronic obstructive pulmonary disease) (HCC) 2004    has inhalers. managed by PCP Dr. Tesfaye Hamm. no pulmonologist    Drug abuse (HCC)     hx Heroin abuse in [place]- pt denies    Full dentures     upper & lower    Hard of hearing     \"they said I need one\" no hearing as of 6/27/24.    Hepatic cirrhosis (HCC) 2023    has chronic low platelet. hx laser ablation of liver. MRI 7/2024. pt denies seeing hepatologist    Hx of chest pain 03/02/2024    hx shortness of breath  \"I fell outside the house\" seen at [facility]. pt denies seeing cardiologist    Hx of hepatitis C     per notes in [place]. Pt denies    Hx of suicide attempt     per notes in [place].    Hypertension 2012    on meds    Hypothyroid 2019    levothyroxine. managed by PCP    No blood products 06/27/2024    pt declined blood products    Poor historian     Psychiatric illness 1994    anxiety, depression, bipolar & PTSD- on meds.  sees Psychiatristt in [place] Dr. Corey Jordan    Restless leg syndrome     on meds \"legs just shake\"    Spleen enlarged     per notes in [place]. pt denies    Uses walker 2024    balance issues- uses cane/walker    Wears prescription eyeglasses     bicofals

## 2025-07-02 NOTE — ED PROVIDER NOTES
[facility]  EMERGENCY DEPARTMENT ENCOUNTER       Pt Name: Kimmy Garcia  MRN: [id number]  Birthdate 1950  Date of evaluation: 6/30/2025  Provider: Tip Robertson MD   PCP: Tesfaye Hamm MD  Note Started: 1:47 PM 7/2/25     CHIEF COMPLAINT       Chief Complaint   Patient presents with    Chest Pain        HISTORY OF PRESENT ILLNESS: 1 or more elements      History From: Patient and Caregiver  History limited by: Nothing     Kimmy Garcia is a 74 y.o. female who presents to the ED via EMS from [facility], patient complaining of chest pain, some nausea, shortness of breath since surgery on her liver.  MCV 6/24/2025.  Patient reports head liver cancer that was treated at this time.  She reports is a persistent right chest pain since the surgery associated with some shortness of breath.  She denies fever.  She is nauseated but no vomiting.  She rates the pain a 6 out of 10 and pain is persistent worse with movement and deep breaths.  She denies abdominal pain.  She denies diarrhea or constipation.  She reports is taking oxycodone which does not seem to be helping the pain.     Nursing Notes were all reviewed and agreed with or any disagreements were addressed in the HPI.     REVIEW OF SYSTEMS      Review of Systems     Positives and Pertinent negatives as per HPI.    PAST HISTORY     Past Medical History:  Past Medical History:   Diagnosis Date    Chronic back pain     Sciatica, neck & right knee. uses licocaine patch & cortisone injection    COPD (chronic obstructive pulmonary disease) (HCC) 2004    has inhalers. managed by PCP Dr. Tesfaye Hamm. no pulmonologist    Drug abuse (HCC)     hx Heroin abuse in [place]- pt denies    Full dentures     upper & lower    Hard of hearing     \"they said I need one\" no hearing as of 6/27/24.    Hepatic cirrhosis (HCC) 2023    has chronic low platelet. hx laser ablation of liver. MRI 7/2024. pt denies seeing hepatologist
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
